# Patient Record
Sex: FEMALE | Race: BLACK OR AFRICAN AMERICAN | NOT HISPANIC OR LATINO | URBAN - METROPOLITAN AREA
[De-identification: names, ages, dates, MRNs, and addresses within clinical notes are randomized per-mention and may not be internally consistent; named-entity substitution may affect disease eponyms.]

---

## 2018-04-18 ENCOUNTER — INPATIENT (INPATIENT)
Facility: HOSPITAL | Age: 68
LOS: 4 days | Discharge: HOME | End: 2018-04-23
Attending: INTERNAL MEDICINE | Admitting: INTERNAL MEDICINE
Payer: MEDICARE

## 2018-04-18 VITALS — WEIGHT: 199.96 LBS | HEIGHT: 64 IN

## 2018-04-18 DIAGNOSIS — R07.9 CHEST PAIN, UNSPECIFIED: ICD-10-CM

## 2018-04-18 DIAGNOSIS — G89.29 OTHER CHRONIC PAIN: ICD-10-CM

## 2018-04-18 DIAGNOSIS — E87.5 HYPERKALEMIA: ICD-10-CM

## 2018-04-18 DIAGNOSIS — M54.9 DORSALGIA, UNSPECIFIED: ICD-10-CM

## 2018-04-18 DIAGNOSIS — Z86.12 PERSONAL HISTORY OF POLIOMYELITIS: ICD-10-CM

## 2018-04-18 DIAGNOSIS — I45.81 LONG QT SYNDROME: ICD-10-CM

## 2018-04-18 DIAGNOSIS — I25.110 ATHEROSCLEROTIC HEART DISEASE OF NATIVE CORONARY ARTERY WITH UNSTABLE ANGINA PECTORIS: ICD-10-CM

## 2018-04-18 DIAGNOSIS — E66.9 OBESITY, UNSPECIFIED: ICD-10-CM

## 2018-04-18 DIAGNOSIS — R94.31 ABNORMAL ELECTROCARDIOGRAM [ECG] [EKG]: ICD-10-CM

## 2018-04-18 DIAGNOSIS — E11.9 TYPE 2 DIABETES MELLITUS WITHOUT COMPLICATIONS: ICD-10-CM

## 2018-04-18 DIAGNOSIS — I44.1 ATRIOVENTRICULAR BLOCK, SECOND DEGREE: ICD-10-CM

## 2018-04-18 DIAGNOSIS — M51.36 OTHER INTERVERTEBRAL DISC DEGENERATION, LUMBAR REGION: ICD-10-CM

## 2018-04-18 DIAGNOSIS — I10 ESSENTIAL (PRIMARY) HYPERTENSION: ICD-10-CM

## 2018-04-18 DIAGNOSIS — I44.7 LEFT BUNDLE-BRANCH BLOCK, UNSPECIFIED: ICD-10-CM

## 2018-04-18 DIAGNOSIS — Z88.0 ALLERGY STATUS TO PENICILLIN: ICD-10-CM

## 2018-04-18 DIAGNOSIS — M50.30 OTHER CERVICAL DISC DEGENERATION, UNSPECIFIED CERVICAL REGION: ICD-10-CM

## 2018-04-18 DIAGNOSIS — D64.9 ANEMIA, UNSPECIFIED: ICD-10-CM

## 2018-04-18 DIAGNOSIS — M51.34 OTHER INTERVERTEBRAL DISC DEGENERATION, THORACIC REGION: ICD-10-CM

## 2018-04-18 DIAGNOSIS — M51.35 OTHER INTERVERTEBRAL DISC DEGENERATION, THORACOLUMBAR REGION: ICD-10-CM

## 2018-04-18 LAB
ALBUMIN SERPL ELPH-MCNC: 3.4 G/DL — SIGNIFICANT CHANGE UP (ref 3.3–5)
ALP SERPL-CCNC: 81 U/L — SIGNIFICANT CHANGE UP (ref 40–120)
ALT FLD-CCNC: 14 U/L — SIGNIFICANT CHANGE UP (ref 12–78)
ANION GAP SERPL CALC-SCNC: 8 MMOL/L — SIGNIFICANT CHANGE UP (ref 5–17)
AST SERPL-CCNC: 12 U/L — LOW (ref 15–37)
BASOPHILS # BLD AUTO: 0.04 K/UL — SIGNIFICANT CHANGE UP (ref 0–0.2)
BASOPHILS NFR BLD AUTO: 0.5 % — SIGNIFICANT CHANGE UP (ref 0–2)
BILIRUB SERPL-MCNC: 0.2 MG/DL — SIGNIFICANT CHANGE UP (ref 0.2–1.2)
BUN SERPL-MCNC: 29 MG/DL — HIGH (ref 7–23)
CALCIUM SERPL-MCNC: 9.3 MG/DL — SIGNIFICANT CHANGE UP (ref 8.5–10.1)
CHLORIDE SERPL-SCNC: 112 MMOL/L — HIGH (ref 96–108)
CK SERPL-CCNC: 109 U/L — SIGNIFICANT CHANGE UP (ref 26–192)
CO2 SERPL-SCNC: 23 MMOL/L — SIGNIFICANT CHANGE UP (ref 22–31)
CREAT SERPL-MCNC: 1.02 MG/DL — SIGNIFICANT CHANGE UP (ref 0.5–1.3)
D DIMER BLD IA.RAPID-MCNC: 525 NG/ML DDU — HIGH
EOSINOPHIL # BLD AUTO: 0.21 K/UL — SIGNIFICANT CHANGE UP (ref 0–0.5)
EOSINOPHIL NFR BLD AUTO: 2.4 % — SIGNIFICANT CHANGE UP (ref 0–6)
GLUCOSE SERPL-MCNC: 88 MG/DL — SIGNIFICANT CHANGE UP (ref 70–99)
HCT VFR BLD CALC: 34.9 % — SIGNIFICANT CHANGE UP (ref 34.5–45)
HGB BLD-MCNC: 11.4 G/DL — LOW (ref 11.5–15.5)
IMM GRANULOCYTES NFR BLD AUTO: 0.2 % — SIGNIFICANT CHANGE UP (ref 0–1.5)
LYMPHOCYTES # BLD AUTO: 2.69 K/UL — SIGNIFICANT CHANGE UP (ref 1–3.3)
LYMPHOCYTES # BLD AUTO: 30.7 % — SIGNIFICANT CHANGE UP (ref 13–44)
MCHC RBC-ENTMCNC: 30.2 PG — SIGNIFICANT CHANGE UP (ref 27–34)
MCHC RBC-ENTMCNC: 32.7 GM/DL — SIGNIFICANT CHANGE UP (ref 32–36)
MCV RBC AUTO: 92.3 FL — SIGNIFICANT CHANGE UP (ref 80–100)
MONOCYTES # BLD AUTO: 0.51 K/UL — SIGNIFICANT CHANGE UP (ref 0–0.9)
MONOCYTES NFR BLD AUTO: 5.8 % — SIGNIFICANT CHANGE UP (ref 2–14)
NEUTROPHILS # BLD AUTO: 5.3 K/UL — SIGNIFICANT CHANGE UP (ref 1.8–7.4)
NEUTROPHILS NFR BLD AUTO: 60.4 % — SIGNIFICANT CHANGE UP (ref 43–77)
NRBC # BLD: 0 /100 WBCS — SIGNIFICANT CHANGE UP (ref 0–0)
PLATELET # BLD AUTO: 395 K/UL — SIGNIFICANT CHANGE UP (ref 150–400)
POTASSIUM SERPL-MCNC: 4.5 MMOL/L — SIGNIFICANT CHANGE UP (ref 3.5–5.3)
POTASSIUM SERPL-SCNC: 4.5 MMOL/L — SIGNIFICANT CHANGE UP (ref 3.5–5.3)
PROT SERPL-MCNC: 8.2 GM/DL — SIGNIFICANT CHANGE UP (ref 6–8.3)
RBC # BLD: 3.78 M/UL — LOW (ref 3.8–5.2)
RBC # FLD: 15.3 % — HIGH (ref 10.3–14.5)
SODIUM SERPL-SCNC: 143 MMOL/L — SIGNIFICANT CHANGE UP (ref 135–145)
TROPONIN I SERPL-MCNC: <0.015 NG/ML — SIGNIFICANT CHANGE UP (ref 0.01–0.04)
WBC # BLD: 8.77 K/UL — SIGNIFICANT CHANGE UP (ref 3.8–10.5)
WBC # FLD AUTO: 8.77 K/UL — SIGNIFICANT CHANGE UP (ref 3.8–10.5)

## 2018-04-18 PROCEDURE — 71045 X-RAY EXAM CHEST 1 VIEW: CPT | Mod: 26

## 2018-04-18 PROCEDURE — 93010 ELECTROCARDIOGRAM REPORT: CPT

## 2018-04-18 PROCEDURE — 71275 CT ANGIOGRAPHY CHEST: CPT | Mod: 26

## 2018-04-18 RX ORDER — KETOROLAC TROMETHAMINE 30 MG/ML
30 SYRINGE (ML) INJECTION ONCE
Qty: 0 | Refills: 0 | Status: DISCONTINUED | OUTPATIENT
Start: 2018-04-18 | End: 2018-04-18

## 2018-04-18 RX ORDER — ASPIRIN/CALCIUM CARB/MAGNESIUM 324 MG
325 TABLET ORAL ONCE
Qty: 0 | Refills: 0 | Status: COMPLETED | OUTPATIENT
Start: 2018-04-18 | End: 2018-04-18

## 2018-04-18 RX ORDER — OXYCODONE AND ACETAMINOPHEN 5; 325 MG/1; MG/1
1 TABLET ORAL ONCE
Qty: 0 | Refills: 0 | Status: DISCONTINUED | OUTPATIENT
Start: 2018-04-18 | End: 2018-04-18

## 2018-04-18 RX ORDER — SODIUM CHLORIDE 9 MG/ML
3 INJECTION INTRAMUSCULAR; INTRAVENOUS; SUBCUTANEOUS ONCE
Qty: 0 | Refills: 0 | Status: COMPLETED | OUTPATIENT
Start: 2018-04-18 | End: 2018-04-18

## 2018-04-18 RX ORDER — KETOROLAC TROMETHAMINE 30 MG/ML
60 SYRINGE (ML) INJECTION ONCE
Qty: 0 | Refills: 0 | Status: DISCONTINUED | OUTPATIENT
Start: 2018-04-18 | End: 2018-04-18

## 2018-04-18 RX ADMIN — Medication 30 MILLIGRAM(S): at 22:05

## 2018-04-18 RX ADMIN — Medication 325 MILLIGRAM(S): at 22:47

## 2018-04-18 RX ADMIN — SODIUM CHLORIDE 3 MILLILITER(S): 9 INJECTION INTRAMUSCULAR; INTRAVENOUS; SUBCUTANEOUS at 21:57

## 2018-04-18 RX ADMIN — OXYCODONE AND ACETAMINOPHEN 1 TABLET(S): 5; 325 TABLET ORAL at 22:48

## 2018-04-18 NOTE — ED ADULT NURSE NOTE - HARM RISK FACTORS
Visit Information Date & Time Provider Department Dept. Phone Encounter #  
 2/24/2017  8:20 AM Gentry Nuñez MD [address] [facility] [phone] Follow-up Instructions Return in about 3 months (around 5/24/2017). Upcoming Health Maintenance Date Due Pneumococcal 19-64 Medium Risk (1 of 1 - PPSV23) 1/15/1993 DTaP/Tdap/Td series (1 - Tdap) 1/15/1995 PAP AKA CERVICAL CYTOLOGY 1/15/1995 INFLUENZA AGE 9 TO ADULT 8/1/2016 Allergies as of 2/24/2017  Review Complete On: 2/24/2017 By: Gentry Nuñez MD  
  
 Severity Noted Reaction Type Reactions Bactrim [Sulfamethoprim Ds]    Unknown (comments) Cefuroxime    Unknown (comments) Codeine    Unknown (comments) Compazine [Prochlorperazine Edisylate]    Unknown (comments) Glycopyrrolate    Unknown (comments) Keflex [Cephalexin]    Unknown (comments) Levaquin [Levofloxacin]    Unknown (comments) Morphine    Unknown (comments) Phenergan [Promethazine]    Unknown (comments) Reglan [Metoclopramide]    Unknown (comments) Vicodin [Hydrocodone-acetaminophen]    Unknown (comments) Current Immunizations  Never Reviewed No immunizations on file. Not reviewed this visit You Were Diagnosed With   
  
 Codes Comments Chronic pain of left ankle    -  Primary ICD-10-CM: M25.572, F31.55 ICD-9-CM: 719.47, 338.29 Intractable migraine with aura without status migrainosus     ICD-10-CM: G43.119 ICD-9-CM: 346.01 Brachial neuritis or radiculitis     ICD-10-CM: M54.12 
ICD-9-CM: 723.4 Neuropathy     ICD-10-CM: G62.9 ICD-9-CM: 355.9 Lumbosacral radiculopathy     ICD-10-CM: M54.17 ICD-9-CM: 724.4 Fibromyalgia     ICD-10-CM: M79.7 ICD-9-CM: 729.1 Musculoskeletal pain     ICD-10-CM: M79.1 ICD-9-CM: 729.1 Restless legs syndrome (RLS)     ICD-10-CM: G25.81 ICD-9-CM: 333.94 Adhesion of abdominal wall     ICD-10-CM: K66.0 ICD-9-CM: 568.0   
 DDD (degenerative disc disease), cervical     ICD-10-CM: M50.30 ICD-9-CM: 722.4 Facet arthropathy, cervical ([address]     ICD-10-CM: M12.88 ICD-9-CM: 721.0 Chronic pain syndrome     ICD-10-CM: G89.4 ICD-9-CM: 338.4 Other syndromes affecting cervical region     ICD-10-CM: M53.1 ICD-9-CM: 723.8 Chronic bilateral low back pain with bilateral sciatica     ICD-10-CM: M54.42, M54.41, G89.29 ICD-9-CM: 724.2, 724.3, 338.29 Encounter for long-term (current) use of high-risk medication     ICD-10-CM: Z79.899 ICD-9-CM: V58.69 Vitals BP  
  
  
  
  
  
 (!) 160/99 Vitals History BMI and BSA Data Body Mass Index Body Surface Area  
 27.41 kg/m 2 1.94 m 2 Preferred Pharmacy Pharmacy Name Phone [address] [place] [address], [address] [place] [address] [phone] Your Updated Medication List  
  
   
This list is accurate as of: 2/24/17  8:48 AM.  Always use your most recent med list.  
  
  
  
  
 ATENOLOL PO Take  by mouth. frovatriptan 2.5 mg tablet Commonly known as:  Rylee Cornejo Take 1 Tab by mouth once as needed for Migraine for up to 1 dose. JANUVIA 100 mg tablet Generic drug:  SITagliptin Take 50 mg by mouth daily. METFORMIN PO Take  by mouth.  
  
 methylPREDNISolone 4 mg tablet Commonly known as:  [place] Per dose pack instructions MOTRIN 800 mg tablet Generic drug:  ibuprofen Take  by mouth.  
  
 naratriptan 2.5 mg Tab Commonly known as:  Rowan Ranjeet Take 1 Tab by mouth once as needed for Migraine for up to 1 dose. 2.5 mg at onset of headache, may repeat in 4 hours if needed NEURONTIN 300 mg capsule Generic drug:  gabapentin Take 300 mg by mouth three (3) times daily. * OTHER  
T-L-S-O  
  
 * OTHER  
DDS Lumbar Traction Belt * PERCOCET  mg per tablet Generic drug:  oxyCODONE-acetaminophen Take 1 Tab by mouth. * oxyCODONE-acetaminophen  mg per tablet Commonly known as:  PERCOCET 10 Take 1 Tab by mouth every four (4) hours as needed for Pain (chronic) for up to 30 days. Max Daily Amount: 6 Tabs. Indications: Pain * oxyCODONE-acetaminophen  mg per tablet Commonly known as:  PERCOCET 10 Take 1 Tab by mouth every four (4) hours as needed for Pain for up to 30 days. Max Daily Amount: 6 Tabs. Indications: Pain Start taking on:  3/25/2017 * oxyCODONE-acetaminophen  mg per tablet Commonly known as:  PERCOCET 10 Take 1 Tab by mouth every four (4) hours as needed for Pain (chronic) for up to 30 days. Max Daily Amount: 6 Tabs. Indications: Pain Start taking on:  4/23/2017  
  
 propranolol LA 60 mg SR capsule Commonly known as:  INDERAL LA Take  by mouth daily. PROTONIX 40 mg tablet Generic drug:  pantoprazole Take 40 mg by mouth daily. REQUIP PO Take  by mouth. TENS Units Toy Leriche Commonly known as:  TENS 502  
1 Units by Does Not Apply route as directed. topiramate 25 mg tablet Commonly known as:  TOPAMAX This is a titration schedule. Start with 1 po qhs x 7 days. Then 1 po bid x 7 days. Then  1 po qam and 2 po qhs. Then  2 po bid. traZODone 150 mg tablet Commonly known as:  Yenny Cindy Take 0.5-1 Tabs by mouth nightly for 30 days. ZOFRAN (AS HYDROCHLORIDE) 8 mg tablet Generic drug:  ondansetron hcl Take 8 mg by mouth every eight (8) hours as needed for Nausea. ZyrTEC 10 mg tablet Generic drug:  cetirizine Take  by mouth daily. * Notice: This list has 6 medication(s) that are the same as other medications prescribed for you. Read the directions carefully, and ask your doctor or other care provider to review them with you. Prescriptions Printed Refills  
 oxyCODONE-acetaminophen (PERCOCET 10)  mg per tablet 0 Starting on: 4/23/2017 Sig: Take 1 Tab by mouth every four (4) hours as needed for Pain (chronic) for up to 30 days. Max Daily Amount: 6 Tabs. Indications: Pain Class: Print Route: Oral  
 oxyCODONE-acetaminophen (PERCOCET 10)  mg per tablet 0 Starting on: 3/25/2017 Sig: Take 1 Tab by mouth every four (4) hours as needed for Pain for up to 30 days. Max Daily Amount: 6 Tabs. Indications: Pain Class: Print Route: Oral  
 oxyCODONE-acetaminophen (PERCOCET 10)  mg per tablet 0 Sig: Take 1 Tab by mouth every four (4) hours as needed for Pain (chronic) for up to 30 days. Max Daily Amount: 6 Tabs. Indications: Pain Class: Print Route: Oral  
 OTHER prn Sig: DDS Lumbar Traction Belt Class: Print Prescriptions Sent to Pharmacy Refills  
 traZODone (DESYREL) 150 mg tablet 11 Sig: Take 0.5-1 Tabs by mouth nightly for 30 days. Class: Normal  
 Pharmacy: [address] [place] [address], [address] [place] [address] #: [phone] Route: Oral  
  
We Performed the Following REFERRAL TO ORTHOPEDICS [KUK294 Custom] Comments:  
 Please evaluate patient for recurrent left ankle sprain Follow-up Instructions Return in about 3 months (around 5/24/2017). Referral Information Referral ID Referred By Referred To  
  
 [id number] [facility] [address] MD   
   [address], [address] Phone: [phone] Fax: [phone] Visits Status Start Date End Date 1 New Request 2/24/17 2/24/18 If your referral has a status of pending review or denied, additional information will be sent to support the outcome of this decision. Patient Instructions Current health maintenance issues were reviewed and the patient was advised to followup with his/her PCP for completion of these items. Introducing [facility]!    
 Logan Rai introduces [employer] patient portal. Now you can access parts of your medical record, email your doctor's office, and request medication refills online. 1. In your internet browser, go to [email or web address] 2. Click on the First Time User? Click Here link in the Sign In box. You will see the New Member Sign Up page. 3. Enter your [employer] Access Code exactly as it appears below. You will not need to use this code after youve completed the sign-up process. If you do not sign up before the expiration date, you must request a new code. · [employer] Access Code: [email or web address] Expires: 3/8/2017 10:00 AM 
 
4. Enter the last four digits of your Social Security Number (xxxx) and Date of Birth (mm/dd/yyyy) as indicated and click Submit. You will be taken to the next sign-up page. 5. Create a [employer] ID. This will be your [employer] login ID and cannot be changed, so think of one that is secure and easy to remember. 6. Create a [employer] password. You can change your password at any time. 7. Enter your Password Reset Question and Answer. This can be used at a later time if you forget your password. 8. Enter your e-mail address. You will receive e-mail notification when new information is available in [address] 9. Click Sign Up. You can now view and download portions of your medical record. 10. Click the Download Summary menu link to download a portable copy of your medical information. If you have questions, please visit the Frequently Asked Questions section of the [employer] website. Remember, [employer] is NOT to be used for urgent needs. For medical emergencies, dial 911. Now available from your iPhone and Android! Please provide this summary of care documentation to your next provider. Your primary care clinician is listed as Amira Samuels. If you have any questions after today's visit, please call [phone]. no

## 2018-04-18 NOTE — ED STATDOCS - MUSCULOSKELETAL, MLM
range of motion is not limited. +b/l LE atrophy from polio. +TTP of right sub scapular area, no midline CTLS TTP.

## 2018-04-18 NOTE — ED STATDOCS - PROGRESS NOTE DETAILS
MAGDA Coy:  Pt seen and examined by intake provider, she presented with right sided upper back pain x 2 wks. I will f/u with labs and reassess the pt. if elevated DDimer will proceed with CTA to r/o PE. MAGDA Morilloit:  Pt with elevated DDimer will check CTA to r/o PE. Pt also f/w new LBBB, will check trop. d/w attending.

## 2018-04-18 NOTE — ED STATDOCS - OBJECTIVE STATEMENT
66 y/o female with a PMHx of type 2 DM, HTN, polio with b/l LE atrophy presents to the ED c/o right sided upper back pain x2weeks. For 2 weeks, pt has had right sided upper back, sub scapular pain. Pt states the pain is worsened and becomes sharp with deep breaths. Pt is not sure how the pain started. Pt tried Bengay, Advil PM without relief. PMD: Dr. Jaylene Boss.

## 2018-04-19 DIAGNOSIS — A80.9 ACUTE POLIOMYELITIS, UNSPECIFIED: ICD-10-CM

## 2018-04-19 DIAGNOSIS — R94.31 ABNORMAL ELECTROCARDIOGRAM [ECG] [EKG]: ICD-10-CM

## 2018-04-19 DIAGNOSIS — E11.9 TYPE 2 DIABETES MELLITUS WITHOUT COMPLICATIONS: ICD-10-CM

## 2018-04-19 DIAGNOSIS — M54.9 DORSALGIA, UNSPECIFIED: ICD-10-CM

## 2018-04-19 DIAGNOSIS — I10 ESSENTIAL (PRIMARY) HYPERTENSION: ICD-10-CM

## 2018-04-19 LAB
ALBUMIN SERPL ELPH-MCNC: 3.3 G/DL — SIGNIFICANT CHANGE UP (ref 3.3–5)
ALP SERPL-CCNC: 89 U/L — SIGNIFICANT CHANGE UP (ref 40–120)
ALT FLD-CCNC: 12 U/L — SIGNIFICANT CHANGE UP (ref 12–78)
ANION GAP SERPL CALC-SCNC: 8 MMOL/L — SIGNIFICANT CHANGE UP (ref 5–17)
AST SERPL-CCNC: 10 U/L — LOW (ref 15–37)
BILIRUB SERPL-MCNC: 0.2 MG/DL — SIGNIFICANT CHANGE UP (ref 0.2–1.2)
BUN SERPL-MCNC: 28 MG/DL — HIGH (ref 7–23)
CALCIUM SERPL-MCNC: 9.2 MG/DL — SIGNIFICANT CHANGE UP (ref 8.5–10.1)
CHLORIDE SERPL-SCNC: 109 MMOL/L — HIGH (ref 96–108)
CHOLEST SERPL-MCNC: 145 MG/DL — SIGNIFICANT CHANGE UP (ref 10–199)
CO2 SERPL-SCNC: 27 MMOL/L — SIGNIFICANT CHANGE UP (ref 22–31)
CREAT SERPL-MCNC: 1.06 MG/DL — SIGNIFICANT CHANGE UP (ref 0.5–1.3)
GLUCOSE BLDC GLUCOMTR-MCNC: 137 MG/DL — HIGH (ref 70–99)
GLUCOSE BLDC GLUCOMTR-MCNC: 87 MG/DL — SIGNIFICANT CHANGE UP (ref 70–99)
GLUCOSE SERPL-MCNC: 80 MG/DL — SIGNIFICANT CHANGE UP (ref 70–99)
HDLC SERPL-MCNC: 60 MG/DL — SIGNIFICANT CHANGE UP (ref 40–125)
LIPID PNL WITH DIRECT LDL SERPL: 73 MG/DL — SIGNIFICANT CHANGE UP
POTASSIUM SERPL-MCNC: 4.7 MMOL/L — SIGNIFICANT CHANGE UP (ref 3.5–5.3)
POTASSIUM SERPL-SCNC: 4.7 MMOL/L — SIGNIFICANT CHANGE UP (ref 3.5–5.3)
PROT SERPL-MCNC: 8.1 GM/DL — SIGNIFICANT CHANGE UP (ref 6–8.3)
SODIUM SERPL-SCNC: 144 MMOL/L — SIGNIFICANT CHANGE UP (ref 135–145)
T3 SERPL-MCNC: 93 NG/DL — SIGNIFICANT CHANGE UP (ref 80–200)
T4 AB SER-ACNC: 6.9 UG/DL — SIGNIFICANT CHANGE UP (ref 4.6–12)
TOTAL CHOLESTEROL/HDL RATIO MEASUREMENT: 2.4 RATIO — LOW (ref 3.3–7.1)
TRIGL SERPL-MCNC: 61 MG/DL — SIGNIFICANT CHANGE UP (ref 10–149)
TROPONIN I SERPL-MCNC: <0.015 NG/ML — SIGNIFICANT CHANGE UP (ref 0.01–0.04)
TROPONIN I SERPL-MCNC: <0.015 NG/ML — SIGNIFICANT CHANGE UP (ref 0.01–0.04)
TSH SERPL-MCNC: 2.28 UU/ML — SIGNIFICANT CHANGE UP (ref 0.36–3.74)

## 2018-04-19 PROCEDURE — 93306 TTE W/DOPPLER COMPLETE: CPT | Mod: 26

## 2018-04-19 PROCEDURE — 99285 EMERGENCY DEPT VISIT HI MDM: CPT

## 2018-04-19 RX ORDER — SERTRALINE 25 MG/1
0 TABLET, FILM COATED ORAL
Qty: 0 | Refills: 0 | COMMUNITY

## 2018-04-19 RX ORDER — MONTELUKAST 4 MG/1
10 TABLET, CHEWABLE ORAL DAILY
Qty: 0 | Refills: 0 | Status: DISCONTINUED | OUTPATIENT
Start: 2018-04-19 | End: 2018-04-23

## 2018-04-19 RX ORDER — SIMVASTATIN 20 MG/1
20 TABLET, FILM COATED ORAL AT BEDTIME
Qty: 0 | Refills: 0 | Status: DISCONTINUED | OUTPATIENT
Start: 2018-04-19 | End: 2018-04-23

## 2018-04-19 RX ORDER — LIDOCAINE 4 G/100G
1 CREAM TOPICAL EVERY 24 HOURS
Qty: 0 | Refills: 0 | Status: DISCONTINUED | OUTPATIENT
Start: 2018-04-19 | End: 2018-04-23

## 2018-04-19 RX ORDER — OXYCODONE HYDROCHLORIDE 5 MG/1
5 TABLET ORAL ONCE
Qty: 0 | Refills: 0 | Status: DISCONTINUED | OUTPATIENT
Start: 2018-04-19 | End: 2018-04-19

## 2018-04-19 RX ORDER — OXYCODONE AND ACETAMINOPHEN 5; 325 MG/1; MG/1
1 TABLET ORAL ONCE
Qty: 0 | Refills: 0 | Status: DISCONTINUED | OUTPATIENT
Start: 2018-04-19 | End: 2018-04-19

## 2018-04-19 RX ORDER — ASPIRIN/CALCIUM CARB/MAGNESIUM 324 MG
81 TABLET ORAL DAILY
Qty: 0 | Refills: 0 | Status: DISCONTINUED | OUTPATIENT
Start: 2018-04-19 | End: 2018-04-23

## 2018-04-19 RX ORDER — SERTRALINE 25 MG/1
100 TABLET, FILM COATED ORAL DAILY
Qty: 0 | Refills: 0 | Status: DISCONTINUED | OUTPATIENT
Start: 2018-04-19 | End: 2018-04-23

## 2018-04-19 RX ORDER — OXYCODONE AND ACETAMINOPHEN 5; 325 MG/1; MG/1
1 TABLET ORAL EVERY 6 HOURS
Qty: 0 | Refills: 0 | Status: DISCONTINUED | OUTPATIENT
Start: 2018-04-19 | End: 2018-04-22

## 2018-04-19 RX ORDER — METFORMIN HYDROCHLORIDE 850 MG/1
0 TABLET ORAL
Qty: 0 | Refills: 0 | COMMUNITY

## 2018-04-19 RX ORDER — LOSARTAN POTASSIUM 100 MG/1
50 TABLET, FILM COATED ORAL DAILY
Qty: 0 | Refills: 0 | Status: DISCONTINUED | OUTPATIENT
Start: 2018-04-19 | End: 2018-04-23

## 2018-04-19 RX ORDER — INSULIN LISPRO 100/ML
VIAL (ML) SUBCUTANEOUS
Qty: 0 | Refills: 0 | Status: DISCONTINUED | OUTPATIENT
Start: 2018-04-19 | End: 2018-04-23

## 2018-04-19 RX ADMIN — MONTELUKAST 10 MILLIGRAM(S): 4 TABLET, CHEWABLE ORAL at 14:30

## 2018-04-19 RX ADMIN — LIDOCAINE 1 PATCH: 4 CREAM TOPICAL at 14:29

## 2018-04-19 RX ADMIN — SERTRALINE 100 MILLIGRAM(S): 25 TABLET, FILM COATED ORAL at 14:30

## 2018-04-19 RX ADMIN — OXYCODONE AND ACETAMINOPHEN 1 TABLET(S): 5; 325 TABLET ORAL at 20:45

## 2018-04-19 RX ADMIN — SIMVASTATIN 20 MILLIGRAM(S): 20 TABLET, FILM COATED ORAL at 21:46

## 2018-04-19 RX ADMIN — OXYCODONE AND ACETAMINOPHEN 1 TABLET(S): 5; 325 TABLET ORAL at 02:17

## 2018-04-19 RX ADMIN — LOSARTAN POTASSIUM 50 MILLIGRAM(S): 100 TABLET, FILM COATED ORAL at 14:30

## 2018-04-19 RX ADMIN — OXYCODONE AND ACETAMINOPHEN 1 TABLET(S): 5; 325 TABLET ORAL at 19:51

## 2018-04-19 RX ADMIN — OXYCODONE AND ACETAMINOPHEN 1 TABLET(S): 5; 325 TABLET ORAL at 01:09

## 2018-04-19 RX ADMIN — OXYCODONE HYDROCHLORIDE 5 MILLIGRAM(S): 5 TABLET ORAL at 14:34

## 2018-04-19 RX ADMIN — Medication 81 MILLIGRAM(S): at 14:29

## 2018-04-19 NOTE — H&P ADULT - NSHPREVIEWOFSYSTEMS_GEN_ALL_CORE
REVIEW OF SYSTEMS:  General: NAD, hemodynamically stable   HEENT:  Eyes:  No visual loss, blurred vision, double vision or yellow sclerae. Ears, Nose, Throat:  No hearing loss, sneezing, congestion, runny nose or sore throat.  SKIN:  No rash or itching.  CARDIOVASCULAR:  No chest pain, chest pressure or chest discomfort. No palpitations or edema.  RESPIRATORY:  No shortness of breath, cough or sputum.  GASTROINTESTINAL:  No anorexia, nausea, vomiting or diarrhea. No abdominal pain or blood.  NEUROLOGICAL:  No headache, dizziness, syncope, paralysis, ataxia, numbness or tingling in the extremities. No change in bowel or bladder control.  MUSCULOSKELETAL:  No muscle, back pain, joint pain or stiffness.  HEMATOLOGIC:  No anemia, bleeding or bruising.  LYMPHATICS:  No enlarged nodes. No history of splenectomy.  ENDOCRINOLOGIC:  No reports of sweating, cold or heat intolerance. No polyuria or polydipsia.  ALLERGIES:  No history of asthma, hives, eczema or rhinitis. REVIEW OF SYSTEMS:  General: NAD, hemodynamically stable   HEENT:  Eyes:  No visual loss, blurred vision, double vision or yellow sclerae. Ears, Nose, Throat:  No hearing loss, sneezing, congestion, runny nose or sore throat.  SKIN:  No rash or itching.  CARDIOVASCULAR:  No chest pain, chest pressure or chest discomfort. No palpitations or edema.  RESPIRATORY:  No shortness of breath, cough or sputum.  GASTROINTESTINAL:  No anorexia, nausea, vomiting or diarrhea. No abdominal pain or blood.  NEUROLOGICAL:  No headache, dizziness, syncope, paralysis, ataxia, numbness or tingling in the extremities. No change in bowel or bladder control.  MUSCULOSKELETAL:  back pain /  worse with motion /  sharp / radiating towards (R) arm  / numbness  ENDOCRINOLOGIC:  hx of DM

## 2018-04-19 NOTE — H&P ADULT - PROBLEM SELECTOR PLAN 1
- Tele -  dropped QRS complexes episodes without OK prlongation  - EKG - QRS prolongation with LBBB  - ASA/ STATIN  - trops x 1 (-)  - Dr. Paige consulted - Tele -  dropped QRS complexes episodes without LA prlongation  - EKG - QRS prolongation with LBBB, unknown baseline  - ASA/ STATIN  - trops x 1 (-)  - Dr. Paige consulted - Tele -  dropped QRS complexes episodes without MA prlongation  - EKG - QRS prolongation with LBBB, unknown baseline  - ASA/ STATIN  - trops x 1 (-)  - Dr. Paige consulted / EP eval - Tele -  dropped QRS complexes episodes without LA prolongation - to me tele tracing appears to be second degree mobitz type II heartblock  - EKG - QRS prolongation with LBBB, unknown baseline  - ASA/ STATIN  - trops x 1 (-)  - Dr. Paige consulted / EP eval

## 2018-04-19 NOTE — H&P ADULT - NSHPPHYSICALEXAM_GEN_ALL_CORE
Physical Exam:   GENERAL APPEARANCE:  NAD, hemodynamically stable  T(C): 36.2 (04-19-18 @ 09:50), Max: 36.5 (04-18-18 @ 21:06)  HR: 72 (04-19-18 @ 09:50) (71 - 77)  BP: 150/75 (04-19-18 @ 09:50) (129/71 - 150/75)  RR: 18 (04-19-18 @ 09:50) (18 - 19)  SpO2: 100% (04-19-18 @ 09:50) (100% - 100%)  Wt(kg): --  HEENT:  Head is normocephalic    Skin:  Warm and dry without any rash   NECK:  Supple without lymphadenopathy.   HEART:  Regular rate and rhythm. normal S1 and S2, No M/R/G  LUNGS:  Good ins/exp effort, no W/R/R/C  ABDOMEN:  Soft, nontender, nondistended with good bowel sounds heard  EXTREMITIES:  Without cyanosis, clubbing or edema.   NEUROLOGICAL:  Gross nonfocal Physical Exam:   GENERAL APPEARANCE:  NAD, hemodynamically stable  T(C): 36.2 (04-19-18 @ 09:50), Max: 36.5 (04-18-18 @ 21:06)  HR: 72 (04-19-18 @ 09:50) (71 - 77)  BP: 150/75 (04-19-18 @ 09:50) (129/71 - 150/75)  RR: 18 (04-19-18 @ 09:50) (18 - 19)  SpO2: 100% (04-19-18 @ 09:50) (100% - 100%)  Wt(kg): --  HEENT:  Head is normocephalic    Skin:  Warm and dry without any rash   NECK:  Supple without lymphadenopathy.   HEART:  Regular rate and rhythm. normal S1 and S2, No M/R/G  LUNGS:  Good ins/exp effort, no W/R/R/C  ABDOMEN:  Soft, nontender, nondistended with good bowel sounds heard  EXTREMITIES:  lower extremity muscle atrophy secondary to polio (wheel-chair dependent) Physical Exam:   GENERAL APPEARANCE:  NAD, hemodynamically stable   T(C): 36.2 (04-19-18 @ 09:50), Max: 36.5 (04-18-18 @ 21:06)  HR: 72 (04-19-18 @ 09:50) (71 - 77)  BP: 150/75 (04-19-18 @ 09:50) (129/71 - 150/75)  RR: 18 (04-19-18 @ 09:50) (18 - 19)  SpO2: 100% (04-19-18 @ 09:50) (100% - 100%)  HEENT:  Head is normocephalic    Skin:  Warm and dry without any rash   NECK:  Supple without lymphadenopathy.   HEART: RRR, normal S1 S2  LUNGS:  Good ins/exp effort   ABDOMEN:  Soft, nontender, nondistended with good bowel sounds heard  EXTREMITIES:  lower extremity muscle atrophy secondary to polio (wheel-chair dependent). Pain reproducible with palpation around the thoracic spine

## 2018-04-19 NOTE — H&P ADULT - PROBLEM SELECTOR PLAN 2
- MRI of the cervical and thoracic spine  - Lidoderm patch - Lidoderm patch  - outpatient workup suggested

## 2018-04-19 NOTE — ED ADULT NURSE REASSESSMENT NOTE - NS ED NURSE REASSESS COMMENT FT1
Pt received in stretcher. A&Ox3. MAEx4. C/O severe pain to lower back. spastic as per pt. Valium given as ordered and tolerated well. All needs are met and safety maintained. Will continue to monitor.

## 2018-04-19 NOTE — CONSULT NOTE ADULT - SUBJECTIVE AND OBJECTIVE BOX
HPI:  Pt is a 68 y/o/f with extensive pmhx of type II DM, HTN, polio (b/l LE atrophy) admitted with cc of (R) sided back pain around the shoulder blade past 2 weeks. Tried to alleviate her back pain with NSAIDs - without any resolution of pain. Patient states that, her back pain worse with motion / radiating occasionally towards her (R) upper extremity and deep breaths. While in the ED - EKG demonstrated QRS prolongation with LBBB. In addition, tele demonstrating episodes of dropped QRS complexes without prolongation of NH interval. Patient chest pain free. Denies any jaw pain, shoulder pain, diaphoresis or shortness of breath. Patient's risk factors include - DM /  HTN.     4/19/18: EP asked to consult for bradycardia, LBBB  TELE: NSR, LBBB with episodes of bradycardia to 35-45 bpm and Mobitz type II HB  EKG: SR 78 bpm, LBBB      PAST MEDICAL & SURGICAL HISTORY:  Polio  HTN (hypertension)  DM (diabetes mellitus)  No significant past surgical history      MEDICATIONS  (STANDING):  aspirin  chewable 81 milliGRAM(s) Oral daily  insulin lispro (HumaLOG) corrective regimen sliding scale   SubCutaneous three times a day before meals  lidocaine   Patch 1 Patch Transdermal every 24 hours  losartan 50 milliGRAM(s) Oral daily  montelukast 10 milliGRAM(s) Oral daily  sertraline 100 milliGRAM(s) Oral daily  simvastatin 20 milliGRAM(s) Oral at bedtime    MEDICATIONS  (PRN):    Allergies    morphine (Unknown)  penicillin (Unknown)  sulfa drugs (Unknown)        SOCIAL HISTORY: Denies tobacco, etoh abuse or illicit drug use    FAMILY HISTORY:  Family history of early CAD (Child)      Vital Signs Last 24 Hrs  T(C): 36.2 (19 Apr 2018 09:50), Max: 36.5 (18 Apr 2018 21:06)  T(F): 97.1 (19 Apr 2018 09:50), Max: 97.7 (18 Apr 2018 21:06)  HR: 72 (19 Apr 2018 09:50) (71 - 77)  BP: 150/75 (19 Apr 2018 09:50) (129/71 - 150/75)  BP(mean): --  RR: 18 (19 Apr 2018 09:50) (18 - 19)  SpO2: 100% (19 Apr 2018 09:50) (100% - 100%)    REVIEW OF SYSTEMS:    CONSTITUTIONAL:  As per HPI.  HEENT:  Eyes:  No diplopia or blurred vision. ENT:  No earache, sore throat or runny nose.  CARDIOVASCULAR:  No pressure, squeezing, strangling, tightness, heaviness or aching about the chest, neck, axilla or epigastrium.  RESPIRATORY:  No cough, shortness of breath, PND or orthopnea.  GASTROINTESTINAL:  No nausea, vomiting or diarrhea.  GENITOURINARY:  No dysuria, frequency or urgency.  MUSCULOSKELETAL:  As per HPI.  SKIN:  No change in skin, hair or nails.  NEUROLOGIC:  No paresthesias, fasciculations, seizures or weakness.  PSYCHIATRIC:  No disorder of thought or mood.  ENDOCRINE:  No heat or cold intolerance, polyuria or polydipsia.  HEMATOLOGICAL:  No easy bruising or bleedings:  .     PHYSICAL EXAMINATION:    GENERAL APPEARANCE:  Pt. is not currently dyspneic, in no distress. Pt. is alert, oriented, and pleasant.  HEENT:  Pupils are normal and react normally. No icterus. Mucous membranes well colored.  NECK:  Supple. No lymphadenopathy. Jugular venous pressure not elevated. Carotids equal.   HEART:   The cardiac impulse has a normal quality. There are no murmurs, rubs or gallops noted  CHEST:  Chest is clear to auscultation. Normal respiratory effort.  ABDOMEN:  Soft and nontender.   EXTREMITIES:  There is no edema.   SKIN:  No rash or significant lesions are noted.      LABS:                        11.4   8.77  )-----------( 395      ( 18 Apr 2018 21:54 )             34.9     04-19    144  |  109<H>  |  28<H>  ----------------------------<  80  4.7   |  27  |  1.06    Ca    9.2      19 Apr 2018 12:21    TPro  8.1  /  Alb  3.3  /  TBili  0.2  /  DBili  x   /  AST  10<L>  /  ALT  12  /  AlkPhos  89  04-19    LIVER FUNCTIONS - ( 19 Apr 2018 12:21 )  Alb: 3.3 g/dL / Pro: 8.1 gm/dL / ALK PHOS: 89 U/L / ALT: 12 U/L / AST: 10 U/L / GGT: x             CARDIAC MARKERS ( 19 Apr 2018 12:21 )  <0.015 ng/mL / x     / x     / x     / x      CARDIAC MARKERS ( 18 Apr 2018 22:44 )  <0.015 ng/mL / x     / 109 U/L / x     / x

## 2018-04-19 NOTE — H&P ADULT - NSHPLABSRESULTS_GEN_ALL_CORE
CBC Full  -  ( 18 Apr 2018 21:54 )  WBC Count : 8.77 K/uL  Hemoglobin : 11.4 g/dL  Hematocrit : 34.9 %  Platelet Count - Automated : 395 K/uL      143  |  112<H>  |  29<H>  ----------------------------<  88  4.5   |  23  |  1.02    Ca    9.3      18 Apr 2018 22:44    TPro  8.2  /  Alb  3.4  /  TBili  0.2  /  DBili  x   /  AST  12<L>  /  ALT  14  /  AlkPhos  81  04-18

## 2018-04-19 NOTE — CONSULT NOTE ADULT - ATTENDING COMMENTS
This patient is a pleasant 67 yr old female w/ obesity, HTN, DM and polio who presented w/ back pain and was noted to have intermittent LBBB on telemetry as well as episodes of 2:1 AV block w/ narrow and wide complex conduction.  No symptoms.    Recommendation:    Check Echocardiogram  A dual Chamber PPM recommended (If LV EF is normal) as a result of her conduction system disease.  R/B alternatives explained and patient is aware and willing to proceed.

## 2018-04-20 LAB
GLUCOSE BLDC GLUCOMTR-MCNC: 314 MG/DL — HIGH (ref 70–99)
GLUCOSE BLDC GLUCOMTR-MCNC: 85 MG/DL — SIGNIFICANT CHANGE UP (ref 70–99)
GLUCOSE BLDC GLUCOMTR-MCNC: 96 MG/DL — SIGNIFICANT CHANGE UP (ref 70–99)
HBA1C BLD-MCNC: 5.4 % — SIGNIFICANT CHANGE UP (ref 4–5.6)

## 2018-04-20 PROCEDURE — 93010 ELECTROCARDIOGRAM REPORT: CPT

## 2018-04-20 PROCEDURE — 71045 X-RAY EXAM CHEST 1 VIEW: CPT | Mod: 26

## 2018-04-20 RX ORDER — ACETAMINOPHEN 500 MG
975 TABLET ORAL ONCE
Qty: 0 | Refills: 0 | Status: COMPLETED | OUTPATIENT
Start: 2018-04-20 | End: 2018-04-20

## 2018-04-20 RX ORDER — ACETAMINOPHEN 500 MG
650 TABLET ORAL EVERY 6 HOURS
Qty: 0 | Refills: 0 | Status: DISCONTINUED | OUTPATIENT
Start: 2018-04-20 | End: 2018-04-22

## 2018-04-20 RX ORDER — VANCOMYCIN HCL 1 G
1000 VIAL (EA) INTRAVENOUS ONCE
Qty: 0 | Refills: 0 | Status: COMPLETED | OUTPATIENT
Start: 2018-04-20 | End: 2018-04-20

## 2018-04-20 RX ADMIN — OXYCODONE AND ACETAMINOPHEN 1 TABLET(S): 5; 325 TABLET ORAL at 19:26

## 2018-04-20 RX ADMIN — MONTELUKAST 10 MILLIGRAM(S): 4 TABLET, CHEWABLE ORAL at 15:46

## 2018-04-20 RX ADMIN — Medication 250 MILLIGRAM(S): at 10:32

## 2018-04-20 RX ADMIN — LOSARTAN POTASSIUM 50 MILLIGRAM(S): 100 TABLET, FILM COATED ORAL at 05:16

## 2018-04-20 RX ADMIN — Medication 4: at 17:34

## 2018-04-20 RX ADMIN — LIDOCAINE 1 PATCH: 4 CREAM TOPICAL at 01:52

## 2018-04-20 RX ADMIN — OXYCODONE AND ACETAMINOPHEN 1 TABLET(S): 5; 325 TABLET ORAL at 02:30

## 2018-04-20 RX ADMIN — SIMVASTATIN 20 MILLIGRAM(S): 20 TABLET, FILM COATED ORAL at 21:23

## 2018-04-20 RX ADMIN — SERTRALINE 100 MILLIGRAM(S): 25 TABLET, FILM COATED ORAL at 15:46

## 2018-04-20 RX ADMIN — Medication 81 MILLIGRAM(S): at 15:46

## 2018-04-20 RX ADMIN — Medication 975 MILLIGRAM(S): at 15:53

## 2018-04-20 RX ADMIN — Medication 975 MILLIGRAM(S): at 14:03

## 2018-04-20 RX ADMIN — OXYCODONE AND ACETAMINOPHEN 1 TABLET(S): 5; 325 TABLET ORAL at 01:51

## 2018-04-20 RX ADMIN — LIDOCAINE 1 PATCH: 4 CREAM TOPICAL at 15:46

## 2018-04-20 NOTE — CONSULT NOTE ADULT - SUBJECTIVE AND OBJECTIVE BOX
Patient is a 67y old  Female who presents with a chief complaint of Back pain (19 Apr 2018 11:47)      HPI:  Pt is a 68 y/o/f with extensive pmhx of type II DM, HTN, polio (b/l LE atrophy) admitted with cc of (R) sided back pain around the shoulder blade past 2 weeks. Tried to alleviate her back pain with NSAIDs - without any resolution of pain. Patient states that, her back pain worse with motion / radiating occasionally towards her (R) upper extremity and deep breaths. While in the ED - EKG demonstrated QRS prolongation with LBBB. In addition, tele demonstrating episodes of dropped QRS complexes without prolongation of [place] interval. Patient chest pain free. Denies any jaw pain, shoulder pain, diaphoresis or shortness of breath. Patient's risk factors include - DM /  HTN.     care discussed with patient's cardiologist Dr. Paige. care also discussed with EP service. (19 Apr 2018 11:47)      PAST MEDICAL & SURGICAL HISTORY:  Polio  HTN (hypertension)  DM (diabetes mellitus)  No significant past surgical history      PREVIOUS CARDIAC WORKUP:      Echo:  Stress Test:  Cardiac Cath:    ALLERGIES:    morphine (Unknown)  penicillin (Unknown)  sulfa drugs (Unknown)       MEDICATIONS  (STANDING):  aspirin  chewable 81 milliGRAM(s) Oral daily  insulin lispro (HumaLOG) corrective regimen sliding scale   SubCutaneous three times a day before meals  lidocaine   Patch 1 Patch Transdermal every 24 hours  losartan 50 milliGRAM(s) Oral daily  montelukast 10 milliGRAM(s) Oral daily  sertraline 100 milliGRAM(s) Oral daily  simvastatin 20 milliGRAM(s) Oral at bedtime    MEDICATIONS  (PRN):  oxyCODONE    5 mG/acetaminophen 325 mG 1 Tablet(s) Oral every 6 hours PRN Severe Pain (7 - 10)      FAMILY HISTORY:  Family history of early CAD (Child)        SOCIAL HISTORY:  .scl     ROS:     .ros    Vital Signs Last 24 Hrs  T(C): 36.7 (20 Apr 2018 06:33), Max: 36.8 (19 Apr 2018 15:30)  T(F): 98.1 (20 Apr 2018 06:33), Max: 98.2 (19 Apr 2018 15:30)  HR: 72 (20 Apr 2018 06:33) (72 - 81)  BP: 135/59 (20 Apr 2018 06:33) (135/59 - 167/73)  BP(mean): --  RR: 16 (20 Apr 2018 06:33) (16 - 18)  SpO2: 100% (20 Apr 2018 04:48) (100% - 100%)    I&O's Summary      PHYSICAL EXAM:    General:                Comfortable, AAO X 3, in no distress.  HEENT:                  Atraumatic, PERRLA, EOMI, conjunctiva clear.   Neck:                     Supple, no adenopathy, no thyromegaly, no JVD, no bruit.  Back:                     Symmetric, non tender.  Chest:                    Clear, B/L symmetric air entry, no tachypnea  Heart:                     S1, S2 normal, no gallop, no murmur.  Abdomen:              Soft, non-tender, bowel sounds active. No palpable masses.  Extremities:           no cyanosis, no edema. Peripheral pulses normal.  Skin:                      Skin color, texture normal. No rashes.  Neurologic:            Grossly nonfocal.      TELEMETRY:    ECG:  NSR, LBBB    LABS:                        11.4   8.77  )-----------( 395      ( 18 Apr 2018 21:54 )             34.9     04-19    144  |  109<H>  |  28<H>  ----------------------------<  80  4.7   |  27  |  1.06    Ca    9.2      19 Apr 2018 12:21    TPro  8.1  /  Alb  3.3  /  TBili  0.2  /  DBili  x   /  AST  10<L>  /  ALT  12  /  AlkPhos  89  04-19    Lipid Panel  Chl 145  HDL 60  LDL 73  Trg 61      04-19 @ 16:23  Trop-I  <0.015    04-19 @ 12:21  Trop-I  <0.015    04-18 @ 22:44  Trop-I  <0.015  CK      109      D Dimer  525 04-18 @ 21:54    Thyroid Stimulating Hormone, Serum (04.19.18 @ 12:21)    Thyroid Stimulating Hormone, Serum: 2.280 uU/mL              RADIOLOGY & ADDITIONAL STUDIES: Chief complaint of Back pain (19 Apr 2018 11:47)      HPI:  Pt is a 68 y/o/f with extensive pmhx of type II DM, HTN, polio (b/l LE atrophy) admitted with cc of (R) sided back pain around the shoulder blade past 2 weeks. Tried to alleviate her back pain with NSAIDs - without any resolution of pain. Patient states that, her back pain worse with motion / radiating occasionally towards her (R) upper extremity. Also worse with deep breaths. While in the ED - EKG demonstrated QRS prolongation with LBBB. In addition, tele demonstrating episodes of dropped QRS complexes without prolongation of [place] interval. Patient chest pain free. Denies any jaw pain, shoulder pain, diaphoresis or shortness of breath. Patient's risk factors include - DM /  HTN. She has been fatigued. C/o generalized weakness. No syncope. Feels dizzy      PAST MEDICAL & SURGICAL HISTORY:  Polio  HTN (hypertension)  DM (diabetes mellitus)  No significant past surgical history      PREVIOUS CARDIAC WORKUP:      Stress Test:  2009 Normal    ALLERGIES:    morphine (Unknown)  penicillin (Unknown)  sulfa drugs (Unknown)       MEDICATIONS  (STANDING):  aspirin  chewable 81 milliGRAM(s) Oral daily  insulin lispro (HumaLOG) corrective regimen sliding scale   SubCutaneous three times a day before meals  lidocaine   Patch 1 Patch Transdermal every 24 hours  losartan 50 milliGRAM(s) Oral daily  montelukast 10 milliGRAM(s) Oral daily  sertraline 100 milliGRAM(s) Oral daily  simvastatin 20 milliGRAM(s) Oral at bedtime    MEDICATIONS  (PRN):  oxyCODONE    5 mG/acetaminophen 325 mG 1 Tablet(s) Oral every 6 hours PRN Severe Pain (7 - 10)      FAMILY HISTORY:  Family history of early CAD (Child)        SOCIAL HISTORY:  Nonsmoker. No ETOH abuse. No illicit drugs.     ROS:     Detailed ten system ROS was performed and was negative except for history as eluded to above.    no fever  no chills  + fatigue  no nausea  no vomiting  no diarrhea  no constipation  no melena  no hematochezia  no chest pain  no palpitations  no sob at rest  no dyspnea on exertion  no cough  no wheezing  no anorexia  no headache  + dizziness  no syncope  + weakness  no myalgia  no dysuria  no polyuria  no hematuria     Vital Signs Last 24 Hrs  T(C): 36.7 (20 Apr 2018 06:33), Max: 36.8 (19 Apr 2018 15:30)  T(F): 98.1 (20 Apr 2018 06:33), Max: 98.2 (19 Apr 2018 15:30)  HR: 72 (20 Apr 2018 06:33) (72 - 81)  BP: 135/59 (20 Apr 2018 06:33) (135/59 - 167/73)  BP(mean): --  RR: 16 (20 Apr 2018 06:33) (16 - 18)  SpO2: 100% (20 Apr 2018 04:48) (100% - 100%)    I&O's Summary      PHYSICAL EXAM:    General:                Comfortable, AAO X 3, in no distress.  HEENT:                  Atraumatic, PERRLA, EOMI, conjunctiva clear.   Neck:                     Supple, no adenopathy, no thyromegaly, no JVD, no bruit.  Back:                     Symmetric, non tender.  Chest:                    Clear, B/L symmetric air entry, no tachypnea  Heart:                     S1, S2 normal, no gallop, + murmur.  Abdomen:              Soft, non-tender, bowel sounds active. No palpable masses.  Extremities:           no cyanosis, no edema. Peripheral pulses normal.  Skin:                      Skin color, texture normal. No rashes.  Neurologic:            Grossly nonfocal.      TELEMETRY:   Second deg AV block Type 2    ECG:  NSR, LBBB    LABS:                        11.4   8.77  )-----------( 395      ( 18 Apr 2018 21:54 )             34.9     04-19    144  |  109<H>  |  28<H>  ----------------------------<  80  4.7   |  27  |  1.06    Ca    9.2      19 Apr 2018 12:21    TPro  8.1  /  Alb  3.3  /  TBili  0.2  /  DBili  x   /  AST  10<L>  /  ALT  12  /  AlkPhos  89  04-19    Lipid Panel  Chl 145  HDL 60  LDL 73  Trg 61      04-19 @ 16:23  Trop-I  <0.015    04-19 @ 12:21  Trop-I  <0.015    04-18 @ 22:44  Trop-I  <0.015  CK      109    D Dimer  525 04-18 @ 21:54    Thyroid Stimulating Hormone, Serum (04.19.18 @ 12:21)    Thyroid Stimulating Hormone, Serum: 2.280 uU/mL    RADIOLOGY & ADDITIONAL STUDIES:    CT Angio Chest PE Protocol w/ IV Cont (04.18.18 @ 23:54) >  IMPRESSION: Negative for pulmonary emboli. No evidence of pneumonia.    Xray Chest 1 View AP/PA. (04.18.18 @ 22:04) >  IMPRESSION:  Clear lungs

## 2018-04-20 NOTE — PROGRESS NOTE ADULT - SUBJECTIVE AND OBJECTIVE BOX
Pt seen chart reviewed  CC: back pain    HPI: 67 year old Woman with pmhx of T2DM, HTN, polio (b/l LE atrophy) admitted with cc of (R) sided back pain around the shoulder blade past 2 weeks. Tried to alleviate her back pain with NSAIDs - without any resolution of pain. Patient states that, her back pain worse with motion / radiating occasionally towards her (R) upper extremity and deep breaths.  While in the ED - EKG demonstrated QRS prolongation with LBBB. In addition, tele demonstrating episodes of dropped QRS complexes without prolongation of [place] interval. Patient chest pain free. Denies any jaw pain, shoulder pain, diaphoresis or shortness of breath. Patient's risk factors include - DM /  HTN.     SUBJECTIVE: now s/p PPM; has a headache 9/10; no CP/palpitations    VITALS:  Vital Signs Last 24 Hrs  T(C): 36.2 (20 Apr 2018 14:30), Max: 36.8 (19 Apr 2018 15:30)  T(F): 97.2 (20 Apr 2018 14:30), Max: 98.2 (19 Apr 2018 15:30)  HR: 85 (20 Apr 2018 14:30) (72 - 96)  BP: 141/80 (20 Apr 2018 14:30) (113/44 - 167/73)  BP(mean): --  RR: 16 (20 Apr 2018 14:30) (16 - 18)  SpO2: 99% (20 Apr 2018 14:30) (98% - 100%)    PHYSICAL EXAM:  HEENT:  pupils equal and reactive, EOMI, no oropharyngeal lesions, erythema, exudates, oral thrush  NECK:   supple, no carotid bruits, no palpable lymph nodes, no thyromegaly  CV:  +S1, +S2, regular, no murmurs or rubs; Left pectoral PPM insertion site intact with dermabond, no bruise/bleeding/hematoma  RESP:   lungs clear to auscultation bilaterally, no wheezing, rales, rhonchi, good air entry bilaterally  ISABELA:  abdomen obese, soft, non-tender, non-distended, normal BS, no bruits, no abdominal masses, no palpable masses  MSK/EXT: BLE flaccid, RLE externally rotated w/ hx of polio, BUE normal muscle tone, no atrophy, no rigidity, no contractions, no c/c/e  VASCULAR:  pulses equal and symmetric in the upper and lower extremities  NEURO:  AAOX3, no focal neurological deficits, follows all commands, able to move extremities spontaneously  SKIN:  no ulcers, lesions or rashes      LABS                  11.4   8.77  )-----------( 395      ( 18 Apr 2018 21:54 )             34.9     04-19    144  |  109<H>  |  28<H>  ----------------------------<  80  4.7   |  27  |  1.06    Ca    9.2      19 Apr 2018 12:21    TPro  8.1  /  Alb  3.3  /  TBili  0.2  /  DBili  x   /  AST  10<L>  /  ALT  12  /  AlkPhos  89  04-19    CARDIAC MARKERS ( 19 Apr 2018 16:23 )  <0.015 ng/mL / x     / x     / x     / x      CARDIAC MARKERS ( 19 Apr 2018 12:21 )  <0.015 ng/mL / x     / x     / x     / x      CARDIAC MARKERS ( 18 Apr 2018 22:44 )  <0.015 ng/mL / x     / 109 U/L / x     / x          LIVER FUNCTIONS - ( 19 Apr 2018 12:21 )  Alb: 3.3 g/dL / Pro: 8.1 gm/dL / ALK PHOS: 89 U/L / ALT: 12 U/L / AST: 10 U/L / GGT: x             RADIOLOGY / NUCLEAR MEDICINE    < from: CT Angio Chest PE Protocol w/ IV Cont (04.18.18 @ 23:54) >  IMPRESSION:    Negative for pulmonary emboli.  No evidence of pneumonia.    < end of copied text >    < from: Transthoracic Echocardiogram (04.19.18 @ 14:47) >   Summary     Normal appearing mitral valve structure and function.   Mild (1+) mitral regurgitation is present.   EA reversal of the mitral inflow consistent with reduced compliance of   the left ventricle.  Normal aortic valve structure and function.   Normal appearing tricuspid valve structureand function.   Mild (1+) tricuspid valve regurgitation is present.   The left ventricle is normal in size, wall thickness, wall motion and   contractility.  Estimated left ventricular ejection fraction is 55 %.   Normal appearing right ventricle structure and function.    < end of copied text >      MEDICATIONS    (STANDING):  aspirin  chewable 81 milliGRAM(s) Oral daily  insulin lispro (HumaLOG) corrective regimen sliding scale   SubCutaneous three times a day before meals  lidocaine   Patch 1 Patch Transdermal every 24 hours  losartan 50 milliGRAM(s) Oral daily  montelukast 10 milliGRAM(s) Oral daily  sertraline 100 milliGRAM(s) Oral daily  simvastatin 20 milliGRAM(s) Oral at bedtime    MEDICATIONS  (PRN):  oxyCODONE    5 mG/acetaminophen 325 mG 1 Tablet(s) Oral every 6 hours PRN Severe Pain (7 - 10) Pt seen chart reviewed  CC: back pain    HPI: 67 year old Woman with pmhx of T2DM, HTN, polio (b/l LE atrophy) admitted with cc of (R) sided back pain around the shoulder blade past 2 weeks. Tried to alleviate her back pain with NSAIDs - without any resolution of pain. Patient states that, her back pain worse with motion / radiating occasionally towards her (R) upper extremity and deep breaths.  While in the ED - EKG demonstrated QRS prolongation with LBBB. In addition, tele demonstrating episodes of dropped QRS complexes without prolongation of [place] interval. Patient chest pain free. Denies any jaw pain, shoulder pain, diaphoresis or shortness of breath. Patient's risk factors include - DM /  HTN.     SUBJECTIVE: now s/p PPM; has a headache 9/10; no CP/palpitations    REVIEW OF SYSTEMS: All other review of systems is negative unless indicated above.    VITALS:  Vital Signs Last 24 Hrs  T(C): 36.2 (20 Apr 2018 14:30), Max: 36.8 (19 Apr 2018 15:30)  T(F): 97.2 (20 Apr 2018 14:30), Max: 98.2 (19 Apr 2018 15:30)  HR: 85 (20 Apr 2018 14:30) (72 - 96)  BP: 141/80 (20 Apr 2018 14:30) (113/44 - 167/73)  BP(mean): --  RR: 16 (20 Apr 2018 14:30) (16 - 18)  SpO2: 99% (20 Apr 2018 14:30) (98% - 100%)    PHYSICAL EXAM:  HEENT:  pupils equal and reactive, EOMI, no oropharyngeal lesions, erythema, exudates, oral thrush  NECK:   supple, no carotid bruits, no palpable lymph nodes, no thyromegaly  CV:  +S1, +S2, regular, no murmurs or rubs; Left pectoral PPM insertion site intact with dermabond, no bruise/bleeding/hematoma  RESP:   lungs clear to auscultation bilaterally, no wheezing, rales, rhonchi, good air entry bilaterally  ISABELA:  abdomen obese, soft, non-tender, non-distended, normal BS, no bruits, no abdominal masses, no palpable masses  MSK/EXT: BLE flaccid, RLE externally rotated w/ hx of polio, BUE normal muscle tone, no atrophy, no rigidity, no contractions, no c/c/e  VASCULAR:  pulses equal and symmetric in the upper and lower extremities  NEURO:  AAOX3, no focal neurological deficits, follows all commands, able to move extremities spontaneously  SKIN:  no ulcers, lesions or rashes      LABS                  11.4   8.77  )-----------( 395      ( 18 Apr 2018 21:54 )             34.9     04-19    144  |  109<H>  |  28<H>  ----------------------------<  80  4.7   |  27  |  1.06    Ca    9.2      19 Apr 2018 12:21    TPro  8.1  /  Alb  3.3  /  TBili  0.2  /  DBili  x   /  AST  10<L>  /  ALT  12  /  AlkPhos  89  04-19    CARDIAC MARKERS ( 19 Apr 2018 16:23 )  <0.015 ng/mL / x     / x     / x     / x      CARDIAC MARKERS ( 19 Apr 2018 12:21 )  <0.015 ng/mL / x     / x     / x     / x      CARDIAC MARKERS ( 18 Apr 2018 22:44 )  <0.015 ng/mL / x     / 109 U/L / x     / x          LIVER FUNCTIONS - ( 19 Apr 2018 12:21 )  Alb: 3.3 g/dL / Pro: 8.1 gm/dL / ALK PHOS: 89 U/L / ALT: 12 U/L / AST: 10 U/L / GGT: x             RADIOLOGY / NUCLEAR MEDICINE    < from: CT Angio Chest PE Protocol w/ IV Cont (04.18.18 @ 23:54) >  IMPRESSION:    Negative for pulmonary emboli.  No evidence of pneumonia.    < end of copied text >    < from: Transthoracic Echocardiogram (04.19.18 @ 14:47) >   Summary     Normal appearing mitral valve structure and function.   Mild (1+) mitral regurgitation is present.   EA reversal of the mitral inflow consistent with reduced compliance of   the left ventricle.  Normal aortic valve structure and function.   Normal appearing tricuspid valve structureand function.   Mild (1+) tricuspid valve regurgitation is present.   The left ventricle is normal in size, wall thickness, wall motion and   contractility.  Estimated left ventricular ejection fraction is 55 %.   Normal appearing right ventricle structure and function.    < end of copied text >      MEDICATIONS    (STANDING):  aspirin  chewable 81 milliGRAM(s) Oral daily  insulin lispro (HumaLOG) corrective regimen sliding scale   SubCutaneous three times a day before meals  lidocaine   Patch 1 Patch Transdermal every 24 hours  losartan 50 milliGRAM(s) Oral daily  montelukast 10 milliGRAM(s) Oral daily  sertraline 100 milliGRAM(s) Oral daily  simvastatin 20 milliGRAM(s) Oral at bedtime    MEDICATIONS  (PRN):  oxyCODONE    5 mG/acetaminophen 325 mG 1 Tablet(s) Oral every 6 hours PRN Severe Pain (7 - 10)

## 2018-04-20 NOTE — PROGRESS NOTE ADULT - ASSESSMENT
Cardiac Arrythmia: LBBB with episodes of bradycardia to 30's/ Mobitz type II HB  - CE's negative  - appreciate EP input --> she's now s/p PPM implant  - monitor per post PPM protocol  ** pt and dtr "Crystal" at bedside educated on activity restrictions  - con't ASA/ STATIN     Back pain   - she has chronic back pain in setting of polio  - con't Lidoderm patch  - outpatient workup suggested.    HTN - controlled  continue with losartan.     ? T2DM - controlled  - A1c 5.4 %  - con't low carb/ no concentrated sweets diet     Polio  - patient is wheelchair bound  - con't supportive care Cardiac Arrythmia: LBBB with episodes of bradycardia to 30's/ Mobitz type II HB  - CE's negative  - appreciate EP input --> she's now s/p PPM implant  - monitor per post PPM protocol  ** pt and dtr "Natty" at bedside educated on activity restrictions  - con't ASA/ STATIN     Back pain   - she has chronic back pain in setting of polio  - con't Lidoderm patch  - outpatient workup suggested.    HTN - controlled  continue with losartan.     ? T2DM - controlled  - A1c 5.4 %  - con't low carb/ no concentrated sweets diet     Polio  - patient is wheelchair bound  - con't supportive care    Attending Statement:  40 minutes spent on total encounter; more than 50% of the visit was spent counseling and/or coordinating care by the attending physician.  Patient seen and examined with ANITA Covington.  Agree with physical exam and assessment and plan.

## 2018-04-21 LAB
ANION GAP SERPL CALC-SCNC: 3 MMOL/L — LOW (ref 5–17)
BASOPHILS # BLD AUTO: 0.02 K/UL — SIGNIFICANT CHANGE UP (ref 0–0.2)
BASOPHILS NFR BLD AUTO: 0.3 % — SIGNIFICANT CHANGE UP (ref 0–2)
BUN SERPL-MCNC: 22 MG/DL — SIGNIFICANT CHANGE UP (ref 7–23)
CALCIUM SERPL-MCNC: 8.7 MG/DL — SIGNIFICANT CHANGE UP (ref 8.5–10.1)
CHLORIDE SERPL-SCNC: 109 MMOL/L — HIGH (ref 96–108)
CO2 SERPL-SCNC: 27 MMOL/L — SIGNIFICANT CHANGE UP (ref 22–31)
CREAT SERPL-MCNC: 0.87 MG/DL — SIGNIFICANT CHANGE UP (ref 0.5–1.3)
EOSINOPHIL # BLD AUTO: 0.23 K/UL — SIGNIFICANT CHANGE UP (ref 0–0.5)
EOSINOPHIL NFR BLD AUTO: 3.1 % — SIGNIFICANT CHANGE UP (ref 0–6)
GLUCOSE BLDC GLUCOMTR-MCNC: 128 MG/DL — HIGH (ref 70–99)
GLUCOSE BLDC GLUCOMTR-MCNC: 131 MG/DL — HIGH (ref 70–99)
GLUCOSE BLDC GLUCOMTR-MCNC: 94 MG/DL — SIGNIFICANT CHANGE UP (ref 70–99)
GLUCOSE SERPL-MCNC: 89 MG/DL — SIGNIFICANT CHANGE UP (ref 70–99)
HCT VFR BLD CALC: 29.6 % — LOW (ref 34.5–45)
HCT VFR BLD CALC: 29.8 % — LOW (ref 34.5–45)
HGB BLD-MCNC: 9.6 G/DL — LOW (ref 11.5–15.5)
HGB BLD-MCNC: 9.6 G/DL — LOW (ref 11.5–15.5)
IMM GRANULOCYTES NFR BLD AUTO: 0.3 % — SIGNIFICANT CHANGE UP (ref 0–1.5)
LYMPHOCYTES # BLD AUTO: 1.93 K/UL — SIGNIFICANT CHANGE UP (ref 1–3.3)
LYMPHOCYTES # BLD AUTO: 25.8 % — SIGNIFICANT CHANGE UP (ref 13–44)
MCHC RBC-ENTMCNC: 30 PG — SIGNIFICANT CHANGE UP (ref 27–34)
MCHC RBC-ENTMCNC: 32.2 GM/DL — SIGNIFICANT CHANGE UP (ref 32–36)
MCV RBC AUTO: 93.1 FL — SIGNIFICANT CHANGE UP (ref 80–100)
MONOCYTES # BLD AUTO: 0.48 K/UL — SIGNIFICANT CHANGE UP (ref 0–0.9)
MONOCYTES NFR BLD AUTO: 6.4 % — SIGNIFICANT CHANGE UP (ref 2–14)
NEUTROPHILS # BLD AUTO: 4.79 K/UL — SIGNIFICANT CHANGE UP (ref 1.8–7.4)
NEUTROPHILS NFR BLD AUTO: 64.1 % — SIGNIFICANT CHANGE UP (ref 43–77)
NRBC # BLD: 0 /100 WBCS — SIGNIFICANT CHANGE UP (ref 0–0)
PLATELET # BLD AUTO: 305 K/UL — SIGNIFICANT CHANGE UP (ref 150–400)
POTASSIUM SERPL-MCNC: 5 MMOL/L — SIGNIFICANT CHANGE UP (ref 3.5–5.3)
POTASSIUM SERPL-SCNC: 5 MMOL/L — SIGNIFICANT CHANGE UP (ref 3.5–5.3)
RBC # BLD: 3.2 M/UL — LOW (ref 3.8–5.2)
RBC # FLD: 15.2 % — HIGH (ref 10.3–14.5)
SODIUM SERPL-SCNC: 139 MMOL/L — SIGNIFICANT CHANGE UP (ref 135–145)
WBC # BLD: 7.47 K/UL — SIGNIFICANT CHANGE UP (ref 3.8–10.5)
WBC # FLD AUTO: 7.47 K/UL — SIGNIFICANT CHANGE UP (ref 3.8–10.5)

## 2018-04-21 PROCEDURE — 93010 ELECTROCARDIOGRAM REPORT: CPT

## 2018-04-21 RX ORDER — KETOROLAC TROMETHAMINE 30 MG/ML
30 SYRINGE (ML) INJECTION ONCE
Qty: 0 | Refills: 0 | Status: DISCONTINUED | OUTPATIENT
Start: 2018-04-21 | End: 2018-04-21

## 2018-04-21 RX ORDER — METOPROLOL TARTRATE 50 MG
25 TABLET ORAL
Qty: 0 | Refills: 0 | Status: DISCONTINUED | OUTPATIENT
Start: 2018-04-21 | End: 2018-04-22

## 2018-04-21 RX ORDER — NITROGLYCERIN 6.5 MG
0.4 CAPSULE, EXTENDED RELEASE ORAL
Qty: 0 | Refills: 0 | Status: DISCONTINUED | OUTPATIENT
Start: 2018-04-21 | End: 2018-04-23

## 2018-04-21 RX ADMIN — Medication 25 MILLIGRAM(S): at 11:29

## 2018-04-21 RX ADMIN — SIMVASTATIN 20 MILLIGRAM(S): 20 TABLET, FILM COATED ORAL at 20:50

## 2018-04-21 RX ADMIN — LIDOCAINE 1 PATCH: 4 CREAM TOPICAL at 13:35

## 2018-04-21 RX ADMIN — Medication 25 MILLIGRAM(S): at 17:30

## 2018-04-21 RX ADMIN — LOSARTAN POTASSIUM 50 MILLIGRAM(S): 100 TABLET, FILM COATED ORAL at 06:02

## 2018-04-21 RX ADMIN — LIDOCAINE 1 PATCH: 4 CREAM TOPICAL at 03:00

## 2018-04-21 RX ADMIN — Medication 30 MILLIGRAM(S): at 02:14

## 2018-04-21 RX ADMIN — OXYCODONE AND ACETAMINOPHEN 1 TABLET(S): 5; 325 TABLET ORAL at 19:31

## 2018-04-21 RX ADMIN — Medication 81 MILLIGRAM(S): at 11:30

## 2018-04-21 RX ADMIN — Medication 650 MILLIGRAM(S): at 20:51

## 2018-04-21 RX ADMIN — MONTELUKAST 10 MILLIGRAM(S): 4 TABLET, CHEWABLE ORAL at 20:50

## 2018-04-21 RX ADMIN — OXYCODONE AND ACETAMINOPHEN 1 TABLET(S): 5; 325 TABLET ORAL at 01:05

## 2018-04-21 RX ADMIN — Medication 30 MILLILITER(S): at 16:56

## 2018-04-21 RX ADMIN — SERTRALINE 100 MILLIGRAM(S): 25 TABLET, FILM COATED ORAL at 11:29

## 2018-04-21 NOTE — PROGRESS NOTE ADULT - SUBJECTIVE AND OBJECTIVE BOX
68 y/o/f with extensive pmhx of type II DM, HTN, polio (b/l LE atrophy) admitted with cc of (R) sided back pain around the shoulder blade past 2 weeks. Tried to alleviate her back pain with NSAIDs - without any resolution of pain. Patient states that, her back pain worse with motion / radiating occasionally towards her (R) upper extremity. Also worse with deep breaths. While in the ED - EKG demonstrated QRS prolongation with LBBB. In addition, tele demonstrating episodes of dropped QRS complexes without prolongation of [place] interval. Patient chest pain free. Denies any jaw pain, shoulder pain, diaphoresis or shortness of breath. Patient's risk factors include - DM /  HTN. She has been fatigued. C/o generalized weakness. No syncope. Feels dizzy    Today, c/o chest pain and back pain. Tightness in the lower chest that lasted for 15 - 20 minutes.     PAST MEDICAL & SURGICAL HISTORY:  Polio  HTN (hypertension)  DM (diabetes mellitus)  No significant past surgical history    CARDIAC WORKUP:    Echo 4/19/18: Nml EF, 1+ MR, 1+ TR. Diastolic dysfunction  Stress Test:  2009 Normal      Allergies:   morphine (Unknown)  penicillin (Unknown)  sulfa drugs (Unknown)      MEDICATIONS  (STANDING):  aspirin  chewable 81 milliGRAM(s) Oral daily  insulin lispro (HumaLOG) corrective regimen sliding scale   SubCutaneous three times a day before meals  lidocaine   Patch 1 Patch Transdermal every 24 hours  losartan 50 milliGRAM(s) Oral daily  montelukast 10 milliGRAM(s) Oral daily  sertraline 100 milliGRAM(s) Oral daily  simvastatin 20 milliGRAM(s) Oral at bedtime    MEDICATIONS  (PRN):  acetaminophen   Tablet. 650 milliGRAM(s) Oral every 6 hours PRN Mild Pain (1 - 3)  oxyCODONE    5 mG/acetaminophen 325 mG 1 Tablet(s) Oral every 6 hours PRN Severe Pain (7 - 10)      ROS:     Detailed ten system ROS was performed and was negative except for history as eluded to above.    no fever  no chills  no nausea  no vomiting  no diarrhea  no constipation  no melena  no hematochezia  + chest pain  no palpitations  no sob at rest  no dyspnea on exertion  no cough  no wheezing  no anorexia  no headache  no dizziness  no syncope  no weakness  no myalgia  no dysuria  no polyuria  no hematuria       Vital Signs Last 24 Hrs  T(C): 36.6 (21 Apr 2018 05:01), Max: 36.7 (20 Apr 2018 17:02)  T(F): 97.8 (21 Apr 2018 05:01), Max: 98.1 (20 Apr 2018 17:02)  HR: 83 (21 Apr 2018 05:01) (72 - 96)  BP: 114/76 (21 Apr 2018 05:01) (113/44 - 141/80)  BP(mean): --  RR: 20 (21 Apr 2018 05:01) (16 - 20)  SpO2: 98% (21 Apr 2018 05:01) (98% - 100%)      PHYSICAL EXAM:    General:                Comfortable, AAO X 3, in no distress.  HEENT:                  Atraumatic, PERRLA, EOMI, conjunctiva clear.    Neck:                     Supple, no adenopathy, no thyromegaly, no JVD, no bruit.  Back:                     Symmetric, non tender.  Chest:                    Clear, B/L symmetric air entry, no tachypnea  Heart:                     S1, S2 normal, no gallop, + murmur.  Abdomen:              Soft, non-tender, bowel sounds active. No palpable masses.  Extremities:           no cyanosis, no edema. Peripheral pulses normal.  Skin:                      Skin color, texture normal. No rashes.  Neurologic:            Grossly nonfocal.       INTERPRETATION OF TELEMETRY:  Sinus, LBBB, intermittent V paced    LABS:                        9.6    7.47  )-----------( 305      ( 21 Apr 2018 05:15 )             29.8     04-21    139  |  109<H>  |  22  ----------------------------<  89  5.0   |  27  |  0.87    Ca    8.7      21 Apr 2018 05:15    TPro  8.1  /  Alb  3.3  /  TBili  0.2  /  DBili  x   /  AST  10<L>  /  ALT  12  /  AlkPhos  89  04-19      Lipid Panel  Chl 145  HDL 60  LDL 73  Trg 61      RADIOLOGY & ADDITIONAL STUDIES:    Xray Chest 1 View- PORTABLE-Urgent (04.20.18 @ 13:12) > IMPRESSION: Status post left chest wall dual-lead pacemaker placement. No pneumothorax.

## 2018-04-21 NOTE — PROGRESS NOTE ADULT - ASSESSMENT
67 yr old female w/ obesity, HTN, DM and polio who presented w/ back pain and was noted to have intermittent LBBB on telemetry as well as episodes of 2:1 AV block w/ narrow and wide complex conduction. S/P dual chamber PPM     -Continue telemetry monitoring  -Continue current medications  - Possible out pt ischemia evaluation after pacemaker.

## 2018-04-21 NOTE — PHYSICAL THERAPY INITIAL EVALUATION ADULT - ACTIVE RANGE OF MOTION EXAMINATION, REHAB EVAL
no Active ROM deficits were identified/Left shoulder flex only assessed to 90 degrees secondary to PPM placement. Bilateral hip flex ~90 degrees and bilateral DF neutral.

## 2018-04-21 NOTE — PHYSICAL THERAPY INITIAL EVALUATION ADULT - PRECAUTIONS/LIMITATIONS, REHAB EVAL
SD/CCU/cardiac precautions tele/cardiac precautions tele/hearing precautions/cardiac precautions/fall precautions

## 2018-04-21 NOTE — PROGRESS NOTE ADULT - ASSESSMENT
Chest pain. Dyspnea. Unstable angina  s/p PPM  Anemia - acute drop in H/H  HTN  DM  HLD      Suggest:    Ischemia evaluation - Based on pt's symptoms, history, risk factors, exam, lab and EKG data I have advised pt have a cardiac catheterization and possible percutaneous coronary intervention. Procedure, its risks, alternatives, benefits and potential complications were discussed in detail. Risks include but not limited to bleeding, infection, allergy, renal failure requiring dialysis, stroke, vascular injury, pericardial tamponade, arrhythmias, MI and even death. Pt is agreeable and has consented for the procedure and the procedure is being scheduled for Monday.   Add low dose beta blockers.   Continue losartan for HTN  Statins  DM control  Check H/H  Check FOB

## 2018-04-21 NOTE — PROGRESS NOTE ADULT - SUBJECTIVE AND OBJECTIVE BOX
Pt seen chart reviewed  CC: back pain    HPI: 67 year old Woman with pmhx of T2DM, HTN, polio (b/l LE atrophy) admitted with cc of (R) sided back pain around the shoulder blade past 2 weeks. Tried to alleviate her back pain with NSAIDs - without any resolution of pain. Patient states that, her back pain worse with motion / radiating occasionally towards her (R) upper extremity and deep breaths. While in the ED - EKG demonstrated QRS prolongation with LBBB. In addition, tele demonstrating episodes of dropped QRS complexes without prolongation of CO interval. Patient chest pain free. Denies any jaw pain, shoulder pain, diaphoresis or shortness of breath. Patient's risk factors include - DM /  HTN.   Pt was admitted and PPM was placed for second degree heart block type II.  She is POD 1 and doing well.  Hospital course complicated by episode of chest pain/heaviness last night.  She was evaluated by cardiology and is being medically optimized for CAD and angina.  She is to go for cardiac cath Monday.  At bedside she has chronic back pain but currently denying CP.      REVIEW OF SYSTEMS: All other review of systems is negative unless indicated above.    Vital Signs Last 24 Hrs  T(C): 36.6 (21 Apr 2018 11:12), Max: 36.7 (20 Apr 2018 17:02)  T(F): 97.8 (21 Apr 2018 11:12), Max: 98.1 (20 Apr 2018 17:02)  HR: 75 (21 Apr 2018 11:12) (75 - 85)  BP: 122/47 (21 Apr 2018 11:12) (114/76 - 141/80)  BP(mean): --  RR: 18 (21 Apr 2018 11:12) (16 - 20)  SpO2: 100% (21 Apr 2018 11:12) (98% - 100%)    PHYSICAL EXAM:  HEENT:  pupils equal and reactive, EOMI, no oropharyngeal lesions, erythema, exudates, oral thrush  NECK:   supple, no carotid bruits, no palpable lymph nodes, no thyromegaly  CV:  +S1, +S2, regular, no murmurs or rubs; Left pectoral PPM insertion site intact with dermabond, no bruise/bleeding/hematoma  RESP:   lungs clear to auscultation bilaterally, no wheezing, rales, rhonchi, good air entry bilaterally  ISABELA:  abdomen obese, soft, non-tender, non-distended, normal BS, no bruits, no abdominal masses, no palpable masses  MSK/EXT: BLE flaccid, RLE externally rotated w/ hx of polio, BUE normal muscle tone, no atrophy, no rigidity, no contractions, no c/c/e  VASCULAR:  pulses equal and symmetric in the upper and lower extremities  NEURO:  AAOX3, no focal neurological deficits, follows all commands, able to move extremities spontaneously  SKIN:  no ulcers, lesions or rashes    MEDICATIONS  (STANDING):  aspirin  chewable 81 milliGRAM(s) Oral daily  insulin lispro (HumaLOG) corrective regimen sliding scale   SubCutaneous three times a day before meals  lidocaine   Patch 1 Patch Transdermal every 24 hours  losartan 50 milliGRAM(s) Oral daily  metoprolol tartrate 25 milliGRAM(s) Oral two times a day  montelukast 10 milliGRAM(s) Oral daily  sertraline 100 milliGRAM(s) Oral daily  simvastatin 20 milliGRAM(s) Oral at bedtime    MEDICATIONS  (PRN):  acetaminophen   Tablet. 650 milliGRAM(s) Oral every 6 hours PRN Mild Pain (1 - 3)  oxyCODONE    5 mG/acetaminophen 325 mG 1 Tablet(s) Oral every 6 hours PRN Severe Pain (7 - 10)      CARDIAC MARKERS ( 19 Apr 2018 16:23 )  <0.015 ng/mL / x     / x     / x     / x                            9.6    x     )-----------( x        ( 21 Apr 2018 12:36 )             29.6     04-21    139  |  109<H>  |  22  ----------------------------<  89  5.0   |  27  |  0.87    Ca    8.7      21 Apr 2018 05:15      CAPILLARY BLOOD GLUCOSE      POCT Blood Glucose.: 131 mg/dL (21 Apr 2018 11:26)  POCT Blood Glucose.: 94 mg/dL (21 Apr 2018 07:54)  POCT Blood Glucose.: 314 mg/dL (20 Apr 2018 17:27)                Assessment and Plan:  67 year old Woman with pmhx of T2DM, HTN, polio (b/l LE atrophy) admitted with (R) sided back pain found to have LBBB and heart block.    		  LBBB with Mobitz type II HB  -s/p PPM POD 1  - CE's negative  - appreciate EP input   - con't ASA/ STATIN    Chest pain/Angina: Possible unstable  -monitor on tele  -started Beta blocker  -cardiac cath Monday    Back pain: chronic back pain in setting of polio  -pain management  -outpatient workup suggested.  -patient is wheelchair bound  -con't supportive care    HTN: controlled  -c/w losartan  -bb added    Anemia: Stable  -monitor    T2DM: controlled  - A1c 5.4 %  - con't low carb/ no concentrated sweets diet    VTE:  -SCDs for now    Attending Statement:  40 minutes spent on total encounter; more than 50% of the visit was spent counseling and/or coordinating care by the attending physician.

## 2018-04-21 NOTE — CHART NOTE - NSCHARTNOTEFT_GEN_A_CORE
Patient feels and looks well this morning 1 day post PPM implant.    VSS,AFeb  Wound C,D,and Intact without hematoma    Pacer check revealed normal dual chamber function      Impression:  Stable s/p PPM Implant      Recs:    F/U wound check as OP   Full instructions provided

## 2018-04-21 NOTE — PHYSICAL THERAPY INITIAL EVALUATION ADULT - MANUAL MUSCLE TESTING RESULTS, REHAB EVAL
Right UE strength 4/5, left UE shoulder not fully assessed all other 4/5. Bilateral LE strength 3/5. All MMT performed within available ROM.

## 2018-04-21 NOTE — PROGRESS NOTE ADULT - SUBJECTIVE AND OBJECTIVE BOX
Problem: Postpartum (Vaginal Delivery) (Adult,Obstetrics,Pediatric)  Goal: Signs and Symptoms of Listed Potential Problems Will be Absent, Minimized or Managed (Postpartum)  Outcome: Ongoing (interventions implemented as appropriate)         HPI:  Pt is a 66 y/o/f with extensive pmhx of type II DM, HTN, polio (b/l LE atrophy) admitted with cc of (R) sided back pain around the shoulder blade past 2 weeks. Tried to alleviate her back pain with NSAIDs - without any resolution of pain. Patient states that, her back pain worse with motion / radiating occasionally towards her (R) upper extremity and deep breaths. While in the ED - EKG demonstrated QRS prolongation with LBBB. In addition, tele demonstrating episodes of dropped QRS complexes without prolongation of DC interval. Patient chest pain free. Denies any jaw pain, shoulder pain, diaphoresis or shortness of breath. Patient's risk factors include - DM /  HTN.     care discussed with patient's cardiologist Dr. Paige. care also discussed with EP service. (19 Apr 2018 11:47)      SUBJECTIVE: Pt. seen, examined and evaluated Pt. resting comfortably in bed in NAD, with no respiratory distress, no c/o chest pain, dyspnea, palpitations, PND, or orthopnea   Patient is a 67y old  Female who presents with a chief complaint of Back pain (19 Apr 2018 11:47)          OBJECTIVE:  Review Of Systems:  Constitutional: [ ] Fever [ ] Chills [ ] Fatigue [ ] Weight change   HEENT: [ ] Blurred vision [ ] Eye Pain [ ] Headache [ ] Runny nose [ ] Sore Throat   Respiratory: [ ] Cough [ ] Wheezing [ ] Shortness of breath  Cardiovascular: [ ] Chest Pain [ ] Palpitations [ ] CONLEY [ ] PND [ ] Orthopnea  Gastrointestinal: [ ] Abdominal Pain [ ] Diarrhea [ ] Constipation [ ] Hemorrhoids [ ] Nausea [ ] Vomiting  Genitourinary: [ ] Nocturia [ ] Dysuria [ ] Incontinence  Extremities: [ ] Swelling [ ] Joint Pain  Neurologic: [ ] Focal deficit [ ] Paresthesias [ ] Syncope  Lymphatic: [ ] Swelling [ ] Lymphadenopathy   Skin: [ ] Rash [ ] Ecchymoses [ ] Wounds [ ] Lesions  Psychiatry: [ ] Depression [ ] Suicidal/Homicidal Ideation [ ] Anxiety [ ] Sleep Disturbances  [X ] 10 point review of systems is otherwise negative except as mentioned above              [ ]Unable to obtain    Allergy:  morphine (Unknown)  penicillin (Unknown)  sulfa drugs (Unknown)            Medications:  MEDICATIONS  (STANDING):  aspirin  chewable 81 milliGRAM(s) Oral daily  insulin lispro (HumaLOG) corrective regimen sliding scale   SubCutaneous three times a day before meals  lidocaine   Patch 1 Patch Transdermal every 24 hours  losartan 50 milliGRAM(s) Oral daily  montelukast 10 milliGRAM(s) Oral daily  sertraline 100 milliGRAM(s) Oral daily  simvastatin 20 milliGRAM(s) Oral at bedtime    MEDICATIONS  (PRN):  acetaminophen   Tablet. 650 milliGRAM(s) Oral every 6 hours PRN Mild Pain (1 - 3)  oxyCODONE    5 mG/acetaminophen 325 mG 1 Tablet(s) Oral every 6 hours PRN Severe Pain (7 - 10)      PMH/PSH/FH/SH: [ ] Unchanged    Vitals:  T(C): 36.6 (04-21-18 @ 05:01), Max: 36.7 (04-20-18 @ 17:02)  HR: 83 (04-21-18 @ 05:01) (72 - 96)  BP: 114/76 (04-21-18 @ 05:01) (113/44 - 141/80)  BP(mean): --  RR: 20 (04-21-18 @ 05:01) (16 - 20)  SpO2: 98% (04-21-18 @ 05:01) (98% - 100%)  Wt(kg): --  Daily     Daily   I&O's Summary      Labs:                        9.6    7.47  )-----------( 305      ( 21 Apr 2018 05:15 )             29.8     04-21    139  |  109<H>  |  22  ----------------------------<  89  5.0   |  27  |  0.87    Ca    8.7      21 Apr 2018 05:15    TPro  8.1  /  Alb  3.3  /  TBili  0.2  /  DBili  x   /  AST  10<L>  /  ALT  12  /  AlkPhos  89  04-19      CARDIAC MARKERS ( 19 Apr 2018 16:23 )  <0.015 ng/mL / x     / x     / x     / x      CARDIAC MARKERS ( 19 Apr 2018 12:21 )  <0.015 ng/mL / x     / x     / x     / x        ECG: SR @ 77BPM LBBB    Echo: < from: Transthoracic Echocardiogram (04.19.18 @ 14:47) >   Impression     Summary     Normal appearing mitral valve structure and function.   Mild (1+) mitral regurgitation is present.   EA reversal of the mitral inflow consistent with reduced compliance of   the   left ventricle.   Normal aortic valve structure and function.   Normal appearing tricuspid valve structureand function.   Mild (1+) tricuspid valve regurgitation is present.   The left ventricle is normal in size, wall thickness, wall motion and   contractility.   Estimated left ventricular ejection fraction is 55 %.   Normal appearing right ventricle structure and function.    Imaging: < from: Xray Chest 1 View- PORTABLE-Urgent (04.20.18 @ 13:12) >  IMPRESSION: Status post left chest wall dual-lead pacemaker placement. No   pneumothorax.    Interpretation of Telemetry: SR 80's with LBBB noted       Physical Exam:  Appearance: [ ] Normal  [ ] abnormal [X ] NAD   Eyes: [ ] PERRL [ ] EOMI  HENT: [ ] Normal [ ] Abnormal oral mucosa [ ]NC/AT  Cardiovascular: [X ] S1 [X ] S2 [ ] RRR [ ] m/r/g [ ]edema [ ] JVP  Procedural Access Site: left chestwall benign no hematoma no bleeding RUBI  Respiratory: [X ] expiratory wheeze noted  Gastrointestinal: [ ] Soft [ ] tenderness[ ] distension [ ] BS  Musculoskeletal: [ ] clubbing [ ] joint deformity   Neurologic: [ ] Non-focal  Lymphatic: [ ] lymphadenopathy  Psychiatry: [X ] AAOx3  [ ] confused [ ] disoriented [ ] Mood & affect appropriate  Skin: [ ]  rashes [ ] ecchymoses [ ] cyanosis

## 2018-04-21 NOTE — PHYSICAL THERAPY INITIAL EVALUATION ADULT - PERTINENT HX OF CURRENT PROBLEM, REHAB EVAL
Pt admitted to  secondary to LBP. In ER pt found to have EKG findings QRS prolongation with LBBB. H/H-9.6/29.8, CT angio-neg for PE.

## 2018-04-22 LAB
ADD ON TEST-SPECIMEN IN LAB: SIGNIFICANT CHANGE UP
ANION GAP SERPL CALC-SCNC: 5 MMOL/L — SIGNIFICANT CHANGE UP (ref 5–17)
BUN SERPL-MCNC: 24 MG/DL — HIGH (ref 7–23)
CALCIUM SERPL-MCNC: 9 MG/DL — SIGNIFICANT CHANGE UP (ref 8.5–10.1)
CHLORIDE SERPL-SCNC: 105 MMOL/L — SIGNIFICANT CHANGE UP (ref 96–108)
CO2 SERPL-SCNC: 30 MMOL/L — SIGNIFICANT CHANGE UP (ref 22–31)
CREAT SERPL-MCNC: 0.82 MG/DL — SIGNIFICANT CHANGE UP (ref 0.5–1.3)
FERRITIN SERPL-MCNC: 47 NG/ML — SIGNIFICANT CHANGE UP (ref 15–150)
FOLATE SERPL-MCNC: 12.7 NG/ML — SIGNIFICANT CHANGE UP
GLUCOSE BLDC GLUCOMTR-MCNC: 118 MG/DL — HIGH (ref 70–99)
GLUCOSE BLDC GLUCOMTR-MCNC: 148 MG/DL — HIGH (ref 70–99)
GLUCOSE BLDC GLUCOMTR-MCNC: 153 MG/DL — HIGH (ref 70–99)
GLUCOSE SERPL-MCNC: 150 MG/DL — HIGH (ref 70–99)
HCT VFR BLD CALC: 31 % — LOW (ref 34.5–45)
HGB BLD-MCNC: 10.1 G/DL — LOW (ref 11.5–15.5)
MCHC RBC-ENTMCNC: 30.3 PG — SIGNIFICANT CHANGE UP (ref 27–34)
MCHC RBC-ENTMCNC: 32.6 GM/DL — SIGNIFICANT CHANGE UP (ref 32–36)
MCV RBC AUTO: 93.1 FL — SIGNIFICANT CHANGE UP (ref 80–100)
NRBC # BLD: 0 /100 WBCS — SIGNIFICANT CHANGE UP (ref 0–0)
PLATELET # BLD AUTO: 312 K/UL — SIGNIFICANT CHANGE UP (ref 150–400)
POTASSIUM SERPL-MCNC: 5.4 MMOL/L — HIGH (ref 3.5–5.3)
POTASSIUM SERPL-SCNC: 5.4 MMOL/L — HIGH (ref 3.5–5.3)
RBC # BLD: 3.33 M/UL — LOW (ref 3.8–5.2)
RBC # FLD: 15.2 % — HIGH (ref 10.3–14.5)
SODIUM SERPL-SCNC: 140 MMOL/L — SIGNIFICANT CHANGE UP (ref 135–145)
TROPONIN I SERPL-MCNC: 0.04 NG/ML — SIGNIFICANT CHANGE UP (ref 0.01–0.04)
VIT B12 SERPL-MCNC: 317 PG/ML — SIGNIFICANT CHANGE UP (ref 232–1245)
WBC # BLD: 8.08 K/UL — SIGNIFICANT CHANGE UP (ref 3.8–10.5)
WBC # FLD AUTO: 8.08 K/UL — SIGNIFICANT CHANGE UP (ref 3.8–10.5)

## 2018-04-22 PROCEDURE — 72131 CT LUMBAR SPINE W/O DYE: CPT | Mod: 26

## 2018-04-22 PROCEDURE — 72128 CT CHEST SPINE W/O DYE: CPT | Mod: 26

## 2018-04-22 PROCEDURE — 93010 ELECTROCARDIOGRAM REPORT: CPT | Mod: 76

## 2018-04-22 RX ORDER — METOPROLOL TARTRATE 50 MG
50 TABLET ORAL
Qty: 0 | Refills: 0 | Status: DISCONTINUED | OUTPATIENT
Start: 2018-04-22 | End: 2018-04-23

## 2018-04-22 RX ORDER — OXYCODONE HYDROCHLORIDE 5 MG/1
10 TABLET ORAL EVERY 4 HOURS
Qty: 0 | Refills: 0 | Status: DISCONTINUED | OUTPATIENT
Start: 2018-04-22 | End: 2018-04-23

## 2018-04-22 RX ORDER — OXYCODONE HYDROCHLORIDE 5 MG/1
5 TABLET ORAL EVERY 4 HOURS
Qty: 0 | Refills: 0 | Status: DISCONTINUED | OUTPATIENT
Start: 2018-04-22 | End: 2018-04-23

## 2018-04-22 RX ORDER — ACETAMINOPHEN 500 MG
1000 TABLET ORAL ONCE
Qty: 0 | Refills: 0 | Status: DISCONTINUED | OUTPATIENT
Start: 2018-04-22 | End: 2018-04-23

## 2018-04-22 RX ORDER — METHOCARBAMOL 500 MG/1
1000 TABLET, FILM COATED ORAL EVERY 8 HOURS
Qty: 0 | Refills: 0 | Status: DISCONTINUED | OUTPATIENT
Start: 2018-04-22 | End: 2018-04-22

## 2018-04-22 RX ADMIN — SERTRALINE 100 MILLIGRAM(S): 25 TABLET, FILM COATED ORAL at 11:38

## 2018-04-22 RX ADMIN — Medication 50 MILLIGRAM(S): at 17:51

## 2018-04-22 RX ADMIN — OXYCODONE HYDROCHLORIDE 10 MILLIGRAM(S): 5 TABLET ORAL at 17:51

## 2018-04-22 RX ADMIN — MONTELUKAST 10 MILLIGRAM(S): 4 TABLET, CHEWABLE ORAL at 11:38

## 2018-04-22 RX ADMIN — Medication 1: at 17:51

## 2018-04-22 RX ADMIN — OXYCODONE AND ACETAMINOPHEN 1 TABLET(S): 5; 325 TABLET ORAL at 00:11

## 2018-04-22 RX ADMIN — LOSARTAN POTASSIUM 50 MILLIGRAM(S): 100 TABLET, FILM COATED ORAL at 06:12

## 2018-04-22 RX ADMIN — LIDOCAINE 1 PATCH: 4 CREAM TOPICAL at 13:19

## 2018-04-22 RX ADMIN — Medication 25 MILLIGRAM(S): at 06:12

## 2018-04-22 RX ADMIN — OXYCODONE HYDROCHLORIDE 10 MILLIGRAM(S): 5 TABLET ORAL at 12:22

## 2018-04-22 RX ADMIN — Medication 81 MILLIGRAM(S): at 11:38

## 2018-04-22 RX ADMIN — LIDOCAINE 1 PATCH: 4 CREAM TOPICAL at 01:04

## 2018-04-22 RX ADMIN — SIMVASTATIN 20 MILLIGRAM(S): 20 TABLET, FILM COATED ORAL at 22:41

## 2018-04-22 RX ADMIN — OXYCODONE AND ACETAMINOPHEN 1 TABLET(S): 5; 325 TABLET ORAL at 06:12

## 2018-04-22 RX ADMIN — OXYCODONE HYDROCHLORIDE 10 MILLIGRAM(S): 5 TABLET ORAL at 21:51

## 2018-04-22 NOTE — CONSULT NOTE ADULT - SUBJECTIVE AND OBJECTIVE BOX
Neurosurgery Consult Note     Patient is a 67y old  Female who presents with a chief complaint of Back pain (19 Apr 2018 11:47)      HPI:  Pt is a 68 y/o/f with extensive past medical history of Type II DM, HTN, Polio wheelchair bound (b/l LE atrophy) admitted with cc of (R) sided back pain around the shoulder blade past 2 weeks. Tried to alleviate her back pain with NSAIDs - without any resolution of pain. Patient states that, her back pain worse with motion / radiating occasionally towards her (R) upper extremity and deep breaths. While in the ED - EKG demonstrated QRS prolongation with LBBB. In addition, tele demonstrating episodes of dropped QRS complexes without prolongation of SD interval. Patient chest pain free. Denies any jaw pain, shoulder pain, diaphoresis.  Patient's risk factors include - DM /  HTN/ obesity.     She just had Pacemaker placement on admission 2 days ago and can not have an MRI   We are called for the back Pain . She is seen and examined . She reports 3 weeks of midback pain no radiculopathy , no paresthesias , after a Long car ride to [place] on March 16th .     PAST MEDICAL & SURGICAL HISTORY:  Polio at age 5   HTN (hypertension)  DM (diabetes mellitus)  No significant past surgical history      Psoc Hx : Lives with daughter     FHx:    Allergies    morphine (Unknown)  penicillin (Unknown)  sulfa drugs (Unknown)      MEDICATIONS  (STANDING):  acetaminophen  IVPB. 1000 milliGRAM(s) IV Intermittent once  aspirin  chewable 81 milliGRAM(s) Oral daily  insulin lispro (HumaLOG) corrective regimen sliding scale   SubCutaneous three times a day before meals  lidocaine   Patch 1 Patch Transdermal every 24 hours  losartan 50 milliGRAM(s) Oral daily  metoprolol tartrate 50 milliGRAM(s) Oral two times a day  montelukast 10 milliGRAM(s) Oral daily  sertraline 100 milliGRAM(s) Oral daily  simvastatin 20 milliGRAM(s) Oral at bedtime      Vital Signs Last 24 Hrs  T(C): 36.7 (22 Apr 2018 16:33), Max: 36.7 (22 Apr 2018 16:33)  T(F): 98 (22 Apr 2018 16:33), Max: 98 (22 Apr 2018 16:33)  HR: 63 (22 Apr 2018 16:33) (60 - 65)  BP: 144/60 (22 Apr 2018 16:33) (144/60 - 147/59)  BP(mean): --  RR: 18 (22 Apr 2018 10:13) (17 - 18)  SpO2: 100% (22 Apr 2018 16:33) (100% - 100%)    PHYSICAL EXAM:      Constitutional: Well developed well nourished     Musculoskeletal: Muscle atrophy bilateral LE' s noted     Psychiatric: Mood:  normal affect    Neuro:     Mental status:   The patient is alert, attentive, and oriented to self, place and situation   Speech is clear and fluent   Fund of knowledge, recent and remote memory are intact Good comprehension  Cranial nerves:  CN II: Visual fields are full to confrontation.   CN III, IV, and VI: At primary gaze, there is no eye deviation,   no ophthalmoplegia no Nystagmus No ptosis.   CN V: Facial sensation is intact to light touch   CN VII: Face is symmetric with normal eye closure and smile.  CN VII: Hearing is normal   CN IX, X: Palate elevates symmetrically. Phonation is normal.  CN XI: Head turning and shoulder shrug are intact  CN XII: Tongue is midline with normal movements and no atrophy.    Motor: good upper body strength   Normal Upper Extremity Muscle bulk and tone   Strength is full bilaterally UE's   Deltoids & Biceps, Triceps 5/5 bilaterally   Wrist extension / flex 5/5 bilaterally  Index to Thumb apposition 5/5 bilaterally,   Pinky to thumb apposition 5/5 bilaterally    + Proximal LE weakness   Iliopsoas, Hamstrings & Quads 1-2/5 bilaterally   Anterior Tibialis & Gastrocnemius 1-2/5 bilaterally  Left Dorsiflexion & Plantar Flex 4/5 bilaterally  Dorsiflexion & Plantar Flex 5/5 bilaterally  Sensory: intact to light touch  2 beats of ankle clonus noted Bilateral LE' s   Gait:  is not tested , Pt is wheelchair bound              Labs :              10.1   8.08  )-----------( 312      ( 22 Apr 2018 06:02 )             31.0    04-22    140  |  105  |  24<H>  ----------------------------<  150<H>  5.4<H>   |  30  |  0.82    Ca    9.0      22 Apr 2018 06:02      Imaging:   PROCEDURE DATE:  04/22/2018      INTERPRETATION:      CT thoracic spine without IV contrast        CLINICAL INFORMATION:  Acute on chronic  Backpain, spinal stenosis,   spondylosis.    TECHNIQUE:  Contiguous axial 2 mm sections were obtained through the   thoracic spine using a single helical acquisition.   Additional 2 mm   sagittal and coronal reconstructions of the spine were obtained. These   additional reformatted images were used to evaluate the spine for   alignment, vertebral fractures and the integrity of the the posterior   elements.   This scan was performed using automatic exposure control   (radiation dose reduction software) to obtain a diagnostic image quality   scan with patient dose as low as reasonably achievable.         FINDINGS:   No prior similar studies are available for review    Thoracic and lumbar vertebral body heights are maintained. No vertebral   fracture is seen. No destructive bone lesion is found.  Alignment is   preserved.  Facet joints appear intact and aligned.    Thoracic and lumbar intervertebral disc spaces demonstrate multilevel   degenerative disc disease and spondylosis involving the visualized lower   cervical, thoracic and lumbar spine. Posterior osteophytic ridge/disc   complexes at T6-7 and T7-8 flatten the ventral thecal sac.  Disc bulges   at L1-2 through L5-S1 flatten the ventral thecal sac and narrow the   BILATERAL neural foramina. Mild facet osteophytic mild-to-moderate facet   osteophytic hypertrophy is noted at L2-3 and L3-4 and L5-S1. Severe facet   osteophytic hypertrophy is noted at L4-5 which contributes to moderate   central stenosis at this level. Small central disc herniation noted at   L5-S1. MR would be required to evaluate the intervertebral discs at   higher sensitivity for disc pathology.    No paraspinal mass is recognized.  Paraspinal soft tissues appear intact.       IMPRESSION:  Unremarkable CT scanof the thoracic spine.   Multilevel   degenerative disc disease and spondylosis involving the visualized lower   cervical, thoracic and lumbar spine. Posterior osteophytic ridge/disc   complexes at T6-7 and T7-8 flatten the ventral thecal sac.  Disc bulges   at L1-2 through L5-S1 flatten the ventral thecal sac and narrow the   BILATERAL neural foramina. Mild facet osteophytic mild-to-moderate facet   osteophytic hypertrophy is noted at L2-3 and L3-4 and L5-S1. Severe facet   osteophytic hypertrophy is noted at L4-5 which contributes to moderate   central stenosis at this level. Small central disc herniation noted at   L5-S1.            QUINTON LINARES M.D., ATTENDING RADIOLOGIST  This document has been electronically signed. Apr 22 2018  1:35PM            Dx: Midback pain

## 2018-04-22 NOTE — CONSULT NOTE ADULT - ASSESSMENT
67 year old female with PMHx of HTN, DM, polio who presented to the ER with 2 week history of back pain in and around right shoulder blade not relieved with NSAIDs.  On tele in the ER found to have LBBB and episodes of bradycardia with second degree HB type II, rates 35-45 bpm.  Currently asymptomatic of this.  Upon further questioning she denies chest pain, reports occasional palpitations, lightheadedness and dizziness.  Denies syncope.    A/P:  Admit to tele   Cardio consult  2D echo to assess LV function.  Will attempt to get records from cardiologist.  Avoid AV elmer blockers  She will likely need a PPM  Case d/w Dr. Madrigal.
67 year old female with h/o Polio/HTN/DM p/w midback pain no radiculopathy . CT of the T/L spine consistent with degenerative changes . She is now unable to obtain an MRI due to recent PPM placement . Stable     1.	Multimodal pain regimen consider pain management   2.	Awaiting Cardiac Cath in am   3.	No Neurosurgical intervention recommended   4.	Case d/w Dr. Miller who concurs
Dizziness, Fatigue  Intermittent LBBB  Intermittent Type 2 second deg AV block  HTN  DM  HLD      Suggest:    PPM today  Avoid node slowing agents.  Echo  Hydration  Continue losartan for HTN  Statins  Dm control  Out pt ischemia evaluation after pacemaker.

## 2018-04-22 NOTE — PROGRESS NOTE ADULT - SUBJECTIVE AND OBJECTIVE BOX
Pt seen chart reviewed  CC: back pain    HPI: 67 year old Woman with pmhx of T2DM, HTN, polio (b/l LE atrophy) admitted with cc of (R) sided back pain around the shoulder blade past 2 weeks. Tried to alleviate her back pain with NSAIDs - without any resolution of pain. Patient states that, her back pain worse with motion / radiating occasionally towards her (R) upper extremity and deep breaths. While in the ED - EKG demonstrated QRS prolongation with LBBB. In addition, tele demonstrating episodes of dropped QRS complexes without prolongation of MN interval. Patient chest pain free. Denies any jaw pain, shoulder pain, diaphoresis or shortness of breath. Patient's risk factors include - DM /  HTN.   Pt was admitted and PPM was placed for second degree heart block type II.  She is POD 1 and doing well.  Hospital course complicated by episode of chest pain/heaviness last night.  She was evaluated by cardiology and is being medically optimized for CAD and angina.  She is to go for cardiac cath Monday.  At bedside she has chronic back pain but currently denying CP.    4/22: Pt still with back pain despite low dose opioids.  Denies CP, SOB.     REVIEW OF SYSTEMS: All other review of systems is negative unless indicated above.    Vital Signs Last 24 Hrs  T(C): 36.6 (22 Apr 2018 10:13), Max: 36.9 (21 Apr 2018 18:20)  T(F): 97.8 (22 Apr 2018 10:13), Max: 98.4 (21 Apr 2018 18:20)  HR: 60 (22 Apr 2018 10:13) (60 - 65)  BP: 147/59 (22 Apr 2018 10:13) (144/70 - 147/59)  BP(mean): --  RR: 18 (22 Apr 2018 10:13) (17 - 18)  SpO2: 100% (22 Apr 2018 10:13) (98% - 100%)    PHYSICAL EXAM:  HEENT:  pupils equal and reactive, EOMI, no oropharyngeal lesions, erythema, exudates, oral thrush  NECK:   supple, no carotid bruits, no palpable lymph nodes, no thyromegaly  CV:  +S1, +S2, regular, no murmurs or rubs; Left pectoral PPM insertion site intact with dermabond, no bruise/bleeding/hematoma  RESP:   lungs clear to auscultation bilaterally, no wheezing, rales, rhonchi, good air entry bilaterally  ISABELA:  abdomen obese, soft, non-tender, non-distended, normal BS, no bruits, no abdominal masses, no palpable masses  MSK/EXT: BLE flaccid, RLE externally rotated w/ hx of polio, BUE normal muscle tone, no atrophy, no rigidity, no contractions, no c/c/e  VASCULAR:  pulses equal and symmetric in the upper and lower extremities  NEURO:  AAOX3, no focal neurological deficits, follows all commands, able to move extremities spontaneously  SKIN:  no ulcers, lesions or rashes    MEDICATIONS  (STANDING):  aspirin  chewable 81 milliGRAM(s) Oral daily  insulin lispro (HumaLOG) corrective regimen sliding scale   SubCutaneous three times a day before meals  lidocaine   Patch 1 Patch Transdermal every 24 hours  losartan 50 milliGRAM(s) Oral daily  metoprolol tartrate 50 milliGRAM(s) Oral two times a day  montelukast 10 milliGRAM(s) Oral daily  sertraline 100 milliGRAM(s) Oral daily  simvastatin 20 milliGRAM(s) Oral at bedtime    MEDICATIONS  (PRN):  acetaminophen   Tablet. 650 milliGRAM(s) Oral every 6 hours PRN Mild Pain (1 - 3)  aluminum hydroxide/magnesium hydroxide/simethicone Suspension 30 milliLiter(s) Oral every 6 hours PRN Dyspepsia  nitroglycerin     SubLingual 0.4 milliGRAM(s) SubLingual every 5 minutes PRN Chest Pain  oxyCODONE    IR 5 milliGRAM(s) Oral every 4 hours PRN Moderate Pain (4 - 6)  oxyCODONE    IR 10 milliGRAM(s) Oral every 4 hours PRN Severe Pain (7 - 10)                              10.1   8.08  )-----------( 312      ( 22 Apr 2018 06:02 )             31.0     04-22    140  |  105  |  24<H>  ----------------------------<  150<H>  5.4<H>   |  30  |  0.82    Ca    9.0      22 Apr 2018 06:02      CAPILLARY BLOOD GLUCOSE      POCT Blood Glucose.: 148 mg/dL (22 Apr 2018 11:29)  POCT Blood Glucose.: 118 mg/dL (22 Apr 2018 08:08)  POCT Blood Glucose.: 128 mg/dL (21 Apr 2018 17:10)      Assessment and Plan:  67 year old Woman with pmhx of T2DM, HTN, polio (b/l LE atrophy) admitted with (R) sided back pain found to have LBBB and heart block.    		  LBBB with Mobitz type II HB  -s/p PPM POD 1  - CE's negative  - appreciate EP input   - con't ASA/ STATIN    Chest pain/Angina: Possible unstable  -monitor on tele  -started Beta blocker and titrate per cardio  -cardiac cath Monday    Back pain: acute on chronic back pain in setting of polio  -pain management  -CT thoracic and lumbar spine  -neurosurgical evaluation  -patient is wheelchair bound  -con't supportive care/pain management    HTN: controlled  -c/w losartan  -bb added    hyperkalemia:  -monitor    Anemia: Stable  -monitor    T2DM: controlled  - A1c 5.4 %  - con't low carb/ no concentrated sweets diet    VTE:  -SCDs for now    Attending Statement:  40 minutes spent on total encounter; more than 50% of the visit was spent counseling and/or coordinating care by the attending physician.

## 2018-04-22 NOTE — PROGRESS NOTE ADULT - SUBJECTIVE AND OBJECTIVE BOX
68 y/o/f with extensive pmhx of type II DM, HTN, polio (b/l LE atrophy) admitted with cc of (R) sided back pain around the shoulder blade past 2 weeks. Tried to alleviate her back pain with NSAIDs - without any resolution of pain. Patient states that, her back pain worse with motion / radiating occasionally towards her (R) upper extremity. Also worse with deep breaths. While in the ED - EKG demonstrated QRS prolongation with LBBB. In addition, tele demonstrating episodes of dropped QRS complexes without prolongation of UT interval. Patient chest pain free. Denies any jaw pain, shoulder pain, diaphoresis or shortness of breath. Patient's risk factors include - DM /  HTN. She has been fatigued. C/o generalized weakness. No syncope. Feels dizzy    Today, c/o chest pain and back pain. Tightness in the lower chest that lasted for 15 - 20 minutes.     PAST MEDICAL & SURGICAL HISTORY:  Polio  HTN (hypertension)  DM (diabetes mellitus)  No significant past surgical history    CARDIAC WORKUP:    Echo 4/19/18: Nml EF, 1+ MR, 1+ TR. Diastolic dysfunction  Stress Test:  2009 Normal    Allergies:   morphine (Unknown)  penicillin (Unknown)  sulfa drugs (Unknown)      MEDICATIONS  (STANDING):  aspirin  chewable 81 milliGRAM(s) Oral daily  insulin lispro (HumaLOG) corrective regimen sliding scale   SubCutaneous three times a day before meals  lidocaine   Patch 1 Patch Transdermal every 24 hours  losartan 50 milliGRAM(s) Oral daily  metoprolol tartrate 25 milliGRAM(s) Oral two times a day  montelukast 10 milliGRAM(s) Oral daily  sertraline 100 milliGRAM(s) Oral daily  simvastatin 20 milliGRAM(s) Oral at bedtime    MEDICATIONS  (PRN):  acetaminophen   Tablet. 650 milliGRAM(s) Oral every 6 hours PRN Mild Pain (1 - 3)  aluminum hydroxide/magnesium hydroxide/simethicone Suspension 30 milliLiter(s) Oral every 6 hours PRN Dyspepsia  nitroglycerin     SubLingual 0.4 milliGRAM(s) SubLingual every 5 minutes PRN Chest Pain  oxyCODONE    5 mG/acetaminophen 325 mG 1 Tablet(s) Oral every 6 hours PRN Severe Pain (7 - 10)      ROS:     Detailed ten system ROS was performed and was negative except for history as eluded to above.    no fever  no chills  no nausea  no vomiting  no diarrhea  no constipation  no melena  no hematochezia  + chest pain  no palpitations  no sob at rest  no dyspnea on exertion  no cough  no wheezing  no anorexia  no headache  no dizziness  no syncope  no weakness  no myalgia  no dysuria  no polyuria  no hematuria       Vital Signs Last 24 Hrs  T(C): 36.6 (22 Apr 2018 10:13), Max: 36.9 (21 Apr 2018 18:20)  T(F): 97.8 (22 Apr 2018 10:13), Max: 98.4 (21 Apr 2018 18:20)  HR: 60 (22 Apr 2018 10:13) (60 - 65)  BP: 147/59 (22 Apr 2018 10:13) (144/70 - 147/59)  BP(mean): --  RR: 18 (22 Apr 2018 10:13) (17 - 18)  SpO2: 100% (22 Apr 2018 10:13) (98% - 100%)    I&O's Summary      PHYSICAL EXAM:    General:                Comfortable, AAO X 3, in no distress.  HEENT:                  Atraumatic, PERRLA, EOMI, conjunctiva clear.    Neck:                     Supple, no adenopathy, no thyromegaly, no JVD, no bruit.  Back:                     Symmetric, non tender.  Chest:                    Clear, B/L symmetric air entry, no tachypnea  Heart:                     S1, S2 normal, no gallop, + murmur.  Abdomen:              Soft, non-tender, bowel sounds active. No palpable masses.  Extremities:           no cyanosis, no edema. Peripheral pulses normal.  Skin:                      Skin color, texture normal. No rashes.  Neurologic:            Grossly nonfocal.       INTERPRETATION OF TELEMETRY:  Sinus, LBBB, intermittent V paced    LABS:                        10.1   8.08  )-----------( 312      ( 22 Apr 2018 06:02 )             31.0     04-22    140  |  105  |  24<H>  ----------------------------<  150<H>  5.4<H>   |  30  |  0.82    Ca    9.0      22 Apr 2018 06:02        Lipid Panel  Chl 145  HDL 60  LDL 73  Trg 61      D Dimer  525 04-18 @ 21:54      RADIOLOGY & ADDITIONAL STUDIES:    Xray Chest 1 View- PORTABLE-Urgent (04.20.18 @ 13:12) > IMPRESSION: Status post left chest wall dual-lead pacemaker placement. No pneumothorax.

## 2018-04-22 NOTE — PROGRESS NOTE ADULT - ASSESSMENT
Chest pain. Dyspnea. Unstable angina  s/p PPM  Anemia - acute drop in H/H  HTN  DM  HLD      Suggest:    Ischemia evaluation - Based on pt's symptoms, history, risk factors, exam, lab and EKG data I have advised pt have a cardiac catheterization and possible percutaneous coronary intervention. Procedure, its risks, alternatives, benefits and potential complications were discussed in detail. Risks include but not limited to bleeding, infection, allergy, renal failure requiring dialysis, stroke, vascular injury, pericardial tamponade, arrhythmias, MI and even death. Pt is agreeable and has consented for the procedure and the procedure is being scheduled for Monday.   Increase beta blockers diose  Check EKG today  Check CE  Continue losartan for HTN  Statins  DM control  H/H stable.

## 2018-04-23 ENCOUNTER — TRANSCRIPTION ENCOUNTER (OUTPATIENT)
Age: 68
End: 2018-04-23

## 2018-04-23 VITALS
TEMPERATURE: 98 F | HEART RATE: 65 BPM | SYSTOLIC BLOOD PRESSURE: 123 MMHG | RESPIRATION RATE: 18 BRPM | DIASTOLIC BLOOD PRESSURE: 62 MMHG | OXYGEN SATURATION: 99 %

## 2018-04-23 LAB
ANION GAP SERPL CALC-SCNC: 5 MMOL/L — SIGNIFICANT CHANGE UP (ref 5–17)
ANION GAP SERPL CALC-SCNC: 5 MMOL/L — SIGNIFICANT CHANGE UP (ref 5–17)
BUN SERPL-MCNC: 24 MG/DL — HIGH (ref 7–23)
BUN SERPL-MCNC: 24 MG/DL — HIGH (ref 7–23)
CALCIUM SERPL-MCNC: 9 MG/DL — SIGNIFICANT CHANGE UP (ref 8.5–10.1)
CALCIUM SERPL-MCNC: 9.1 MG/DL — SIGNIFICANT CHANGE UP (ref 8.5–10.1)
CHLORIDE SERPL-SCNC: 107 MMOL/L — SIGNIFICANT CHANGE UP (ref 96–108)
CHLORIDE SERPL-SCNC: 107 MMOL/L — SIGNIFICANT CHANGE UP (ref 96–108)
CO2 SERPL-SCNC: 28 MMOL/L — SIGNIFICANT CHANGE UP (ref 22–31)
CO2 SERPL-SCNC: 29 MMOL/L — SIGNIFICANT CHANGE UP (ref 22–31)
CREAT SERPL-MCNC: 0.79 MG/DL — SIGNIFICANT CHANGE UP (ref 0.5–1.3)
CREAT SERPL-MCNC: 0.86 MG/DL — SIGNIFICANT CHANGE UP (ref 0.5–1.3)
GLUCOSE BLDC GLUCOMTR-MCNC: 115 MG/DL — HIGH (ref 70–99)
GLUCOSE BLDC GLUCOMTR-MCNC: 164 MG/DL — HIGH (ref 70–99)
GLUCOSE SERPL-MCNC: 101 MG/DL — HIGH (ref 70–99)
GLUCOSE SERPL-MCNC: 96 MG/DL — SIGNIFICANT CHANGE UP (ref 70–99)
HCT VFR BLD CALC: 30.3 % — LOW (ref 34.5–45)
HGB BLD-MCNC: 9.9 G/DL — LOW (ref 11.5–15.5)
MAGNESIUM SERPL-MCNC: 1.7 MG/DL — SIGNIFICANT CHANGE UP (ref 1.6–2.6)
MCHC RBC-ENTMCNC: 29.9 PG — SIGNIFICANT CHANGE UP (ref 27–34)
MCHC RBC-ENTMCNC: 32.7 GM/DL — SIGNIFICANT CHANGE UP (ref 32–36)
MCV RBC AUTO: 91.5 FL — SIGNIFICANT CHANGE UP (ref 80–100)
NRBC # BLD: 0 /100 WBCS — SIGNIFICANT CHANGE UP (ref 0–0)
PHOSPHATE SERPL-MCNC: 3.5 MG/DL — SIGNIFICANT CHANGE UP (ref 2.5–4.5)
PLATELET # BLD AUTO: 310 K/UL — SIGNIFICANT CHANGE UP (ref 150–400)
POTASSIUM SERPL-MCNC: 4.6 MMOL/L — SIGNIFICANT CHANGE UP (ref 3.5–5.3)
POTASSIUM SERPL-MCNC: 5.5 MMOL/L — HIGH (ref 3.5–5.3)
POTASSIUM SERPL-SCNC: 4.6 MMOL/L — SIGNIFICANT CHANGE UP (ref 3.5–5.3)
POTASSIUM SERPL-SCNC: 5.5 MMOL/L — HIGH (ref 3.5–5.3)
RBC # BLD: 3.31 M/UL — LOW (ref 3.8–5.2)
RBC # FLD: 15 % — HIGH (ref 10.3–14.5)
SODIUM SERPL-SCNC: 140 MMOL/L — SIGNIFICANT CHANGE UP (ref 135–145)
SODIUM SERPL-SCNC: 141 MMOL/L — SIGNIFICANT CHANGE UP (ref 135–145)
TROPONIN I SERPL-MCNC: 0.02 NG/ML — SIGNIFICANT CHANGE UP (ref 0.01–0.04)
TROPONIN I SERPL-MCNC: 0.02 NG/ML — SIGNIFICANT CHANGE UP (ref 0.01–0.04)
WBC # BLD: 9.05 K/UL — SIGNIFICANT CHANGE UP (ref 3.8–10.5)
WBC # FLD AUTO: 9.05 K/UL — SIGNIFICANT CHANGE UP (ref 3.8–10.5)

## 2018-04-23 PROCEDURE — 93010 ELECTROCARDIOGRAM REPORT: CPT

## 2018-04-23 RX ORDER — METFORMIN HYDROCHLORIDE 850 MG/1
2 TABLET ORAL
Qty: 0 | Refills: 0 | COMMUNITY

## 2018-04-23 RX ORDER — ACETAMINOPHEN 500 MG
2 TABLET ORAL
Qty: 180 | Refills: 0
Start: 2018-04-23 | End: 2018-05-22

## 2018-04-23 RX ORDER — ASPIRIN/CALCIUM CARB/MAGNESIUM 324 MG
1 TABLET ORAL
Qty: 30 | Refills: 0
Start: 2018-04-23 | End: 2018-05-22

## 2018-04-23 RX ORDER — METOPROLOL TARTRATE 50 MG
1 TABLET ORAL
Qty: 60 | Refills: 0
Start: 2018-04-23 | End: 2018-05-22

## 2018-04-23 RX ORDER — METHOCARBAMOL 500 MG/1
750 TABLET, FILM COATED ORAL THREE TIMES A DAY
Qty: 0 | Refills: 0 | Status: DISCONTINUED | OUTPATIENT
Start: 2018-04-23 | End: 2018-04-23

## 2018-04-23 RX ADMIN — METHOCARBAMOL 750 MILLIGRAM(S): 500 TABLET, FILM COATED ORAL at 12:39

## 2018-04-23 RX ADMIN — Medication 1: at 12:38

## 2018-04-23 RX ADMIN — Medication 81 MILLIGRAM(S): at 09:14

## 2018-04-23 RX ADMIN — LOSARTAN POTASSIUM 50 MILLIGRAM(S): 100 TABLET, FILM COATED ORAL at 05:30

## 2018-04-23 RX ADMIN — METHOCARBAMOL 750 MILLIGRAM(S): 500 TABLET, FILM COATED ORAL at 05:31

## 2018-04-23 RX ADMIN — Medication 50 MILLIGRAM(S): at 05:30

## 2018-04-23 RX ADMIN — LIDOCAINE 1 PATCH: 4 CREAM TOPICAL at 12:40

## 2018-04-23 RX ADMIN — SERTRALINE 100 MILLIGRAM(S): 25 TABLET, FILM COATED ORAL at 12:38

## 2018-04-23 RX ADMIN — MONTELUKAST 10 MILLIGRAM(S): 4 TABLET, CHEWABLE ORAL at 12:38

## 2018-04-23 RX ADMIN — METHOCARBAMOL 750 MILLIGRAM(S): 500 TABLET, FILM COATED ORAL at 00:27

## 2018-04-23 RX ADMIN — Medication 0.4 MILLIGRAM(S): at 00:53

## 2018-04-23 NOTE — DISCHARGE NOTE ADULT - CARE PROVIDERS DIRECT ADDRESSES
,DirectAddress_Unknown,DirectAddress_Unknown,[email or web address]

## 2018-04-23 NOTE — DISCHARGE NOTE ADULT - MEDICATION SUMMARY - MEDICATIONS TO STOP TAKING
I will STOP taking the medications listed below when I get home from the hospital:    metFORMIN 500 mg oral tablet

## 2018-04-23 NOTE — DISCHARGE NOTE ADULT - HOSPITAL COURSE
67 year old Woman with pmhx of T2DM, HTN, polio (b/l LE atrophy) admitted with cc of (R) sided back pain around the shoulder blade past 2 weeks. Tried to alleviate her back pain with NSAIDs - without any resolution of pain. Patient states that, her back pain worse with motion / radiating occasionally towards her (R) upper extremity and deep breaths. While in the ED - EKG demonstrated QRS prolongation with LBBB. In addition, tele demonstrating episodes of dropped QRS complexes without prolongation of OH interval.   She was admitted and underwent PPM placement for second degree heart block type II.  Hospital course complicated by episode of chest pain/heaviness for which she underwent cardiac cath which revealed non-obstruct CAD.     Today she remains chest pain free, underwent neurosurgery eval for chronic back pain r/t being w/c bound d/t polio. Moves well in bed, sits at edge of bed herself. She remains stable for discharge home. Pt and grandson educated on the post discharge meds, follow up and procedure care. She is to make an freda't to f/u with Dr Paige within 1 week. Pt and grandson verbalized an understanding of and agreed with discharge plans.        PHYSICAL EXAM:  HEENT:  pupils equal and reactive, EOMI, no oropharyngeal lesions, erythema, exudates, oral thrush  NECK:   supple, no carotid bruits, no palpable lymph nodes, no thyromegaly  CV:  +S1, +S2, regular, no murmurs or rubs; Left pectoral PPM insertion site intact with dermabond, no bruise/bleeding/hematoma  RESP:   lungs clear to auscultation bilaterally, no wheezing, rales, rhonchi, good air entry bilaterally  ISABELA:  abdomen obese, soft, non-tender, non-distended, normal BS, no bruits, no abdominal masses, no palpable masses  MSK/EXT: BLE flaccid, RLE externally rotated w/ hx of polio, BUE normal muscle tone, no atrophy, no rigidity, no contractions, no c/c/e  VASCULAR:  pulses equal and symmetric in the upper and lower extremities  NEURO:  AAOX3, no focal neurological deficits, follows all commands, able to move extremities spontaneously  SKIN:  no ulcers, lesions or rashes    ASSESSMENT / PLAN  67 year old Woman with pmhx of T2DM, HTN, polio (b/l LE atrophy) admitted with (R) sided back pain found to have LBBB and heart block admitted and treated for:    LBBB with Mobitz type II HB  -s/p PPM 4/20  - con't ASA/ STATIN  - Follow with cardiology after discharge    Chest pain/Angina:   - started Beta blocker admission - tolerating well  - s/p cardiac cath--> non-obstruct CAD    Back pain: acute on chronic back pain in setting of polio  - CT spine reviewed-->diffuse degenerative changes throughout the cervical, thoracic and lumbar spine  -  moves well in bed  -pain management - tylenol at discharge  -neurosurgical evaluation appreciated  -con't supportive care/pain management  - outpt follow up with her PCP    HTN: controlled  -c/w losartan / lopressor    Anemia: Stable  - monitor    T2DM: controlled  - A1c 5.4 %  - con't low carb/ no concentrated sweets diet  - will dc home dose metformin, con't glypizide    Dispo  - discharge to home with DME 67 year old Woman with pmhx of T2DM, HTN, polio (b/l LE atrophy) admitted with cc of (R) sided back pain around the shoulder blade past 2 weeks. Tried to alleviate her back pain with NSAIDs - without any resolution of pain. Patient states that, her back pain worse with motion / radiating occasionally towards her (R) upper extremity and deep breaths. While in the ED - EKG demonstrated QRS prolongation with LBBB. In addition, tele demonstrating episodes of dropped QRS complexes without prolongation of OK interval.   She was admitted and underwent PPM placement for second degree heart block type II.  Hospital course complicated by episode of chest pain/heaviness for which she underwent cardiac cath which revealed non-obstruct CAD.     Today she remains chest pain free, underwent neurosurgery eval for chronic back pain r/t being w/c bound d/t polio. Moves well in bed, sits at edge of bed herself. She remains stable for discharge home. Pt and grandson educated on the post discharge meds, follow up and procedure care. She is to make an freda't to f/u with Dr Paige within 1 week. Pt and grandson verbalized an understanding of and agreed with discharge plans.    REVIEW OF SYSTEMS: All other review of systems is negative unless indicated above.    PHYSICAL EXAM:  HEENT:  pupils equal and reactive, EOMI, no oropharyngeal lesions, erythema, exudates, oral thrush  NECK:   supple, no carotid bruits, no palpable lymph nodes, no thyromegaly  CV:  +S1, +S2, regular, no murmurs or rubs; Left pectoral PPM insertion site intact with dermabond, no bruise/bleeding/hematoma  RESP:   lungs clear to auscultation bilaterally, no wheezing, rales, rhonchi, good air entry bilaterally  ISABELA:  abdomen obese, soft, non-tender, non-distended, normal BS, no bruits, no abdominal masses, no palpable masses  MSK/EXT: BLE flaccid, RLE externally rotated w/ hx of polio, BUE normal muscle tone, no atrophy, no rigidity, no contractions, no c/c/e  VASCULAR:  pulses equal and symmetric in the upper and lower extremities  NEURO:  AAOX3, no focal neurological deficits, follows all commands, able to move extremities spontaneously  SKIN:  no ulcers, lesions or rashes    meds/labs: Reviewed    ASSESSMENT / PLAN  67 year old Woman with pmhx of T2DM, HTN, polio (b/l LE atrophy) admitted with (R) sided back pain found to have LBBB and heart block admitted and treated for:    LBBB with Mobitz type II HB  -s/p PPM 4/20  - con't ASA/ STATIN  - Follow with cardiology after discharge    Chest pain/Angina:   - started Beta blocker admission - tolerating well  - s/p cardiac cath--> non-obstruct CAD    Back pain: acute on chronic back pain in setting of polio  - CT spine reviewed-->diffuse degenerative changes throughout the cervical, thoracic and lumbar spine  -  moves well in bed  -pain management - tylenol at discharge  -neurosurgical evaluation appreciated  -con't supportive care/pain management  - outpt follow up with her PCP    HTN: controlled  -c/w losartan / lopressor    Anemia: Stable  - monitor    T2DM: controlled  - A1c 5.4 %  - con't low carb/ no concentrated sweets diet  - will dc home dose metformin, con't glypizide    Dispo  - discharge to home with DME    Attending Statement:  40 minutes spent on total encounter; more than 50% of the visit was spent counseling and/or coordinating care by the attending physician.  Patient seen and examined with ANITA Covington.  Agree with physical exam and assessment and plan.

## 2018-04-23 NOTE — PROGRESS NOTE ADULT - SUBJECTIVE AND OBJECTIVE BOX
68 yo female admitted with right shoulder pain; occasional radiation into right arm. CT spine demonstrates diffuse degenerative changes throughout the cervical, thoracic and lumbar spine.  Patient has long standing difficulties with ambulating secondary to childhood polio. No reported change in neurologic exam. Also complains of numbness of hands , right greater than left.  Past History: Diabetes, Pacemaker, CAHD.  EXAM: alert and oriented, moves all extremities.  4/5 weakness of both lower extremities consistent with baseline performance according to patient..  Cardiac workup including cardiac catheterization today reported negative.  Unable to have MRI because of cardiac pacemaker.  IMPRESSION: Right shoulder pain.  SUGGEST outpatient work up with neurology, physical therapy and pain management.

## 2018-04-23 NOTE — DISCHARGE NOTE ADULT - NS AS ACTIVITY OBS
No vigorous activity with left arm; no raising of left arm above the head for 2 months or until your follow up with the electrophysiologist or your regular cardiologist. Do not apply lotion, cream to the pacemaker insertion site. No soaking of pacemaker insertion site in tub or swimming pool.

## 2018-04-23 NOTE — CHART NOTE - NSCHARTNOTEFT_GEN_A_CORE
RN to MD call at 1:20am pt c/o of chest pain    Pt was seen and examined at bedside immediately and is resting comfortably. Pt at bedside denies any HA, SOB, Chest Pain, Palpitations, N/V, abdominal pain. In addition, pt denies any left arm pain, neck pain, jaw pain. Pt has a chronic history of back pain, however, denies the chest pain is radiating to her back. Pt has no further complaints.    Vitals: BP: 111/62, HR 68, Temp: 98.5, Oxygen 100% room air, RR 17  General: NAD, resting comfortably in bed  Lungs: CTA b/l no wheezing, no crackles, no rales or ronchi  CVS: Normal S1 and S2, RRR  Neuro: AAOx3    67F c/o of left sided chest pain - seen at bedside s/p 1 dose of sublingual nitroglycerin  -  continue aspirin 81mg, lopressor 50mg, losartan 50mg, simvastatin 20mg,   - STAT EKG: showed LBBB HR of 65 NSR - no change from previous EKG   -  f/u troponin level, bmp and mag and phos  - does not meet sgarbosa criteria  - pt is scheduled for angiogram in am  - NPO        case discussed with Dr. Mann and Dr. Guevara

## 2018-04-23 NOTE — PACU DISCHARGE NOTE - COMMENTS
report to Jeaneth prater on 3e/pt transported via remote monitor/pt verbalizes  understanding or limitations in activtiy

## 2018-04-23 NOTE — CHART NOTE - NSCHARTNOTEFT_GEN_A_CORE
s/p LHC revealing non obstructive CAD    Patient feels well.  Denies chest pain, shortness of breath, dizziness or palpitations at this time    Right groin procedure site CDI.  no bleeding, no hematoma, site soft, non tender, positive pedal pulses bilaterally      HPI:  Pt is a 66 y/o/f with extensive pmhx of type II DM, HTN, polio (b/l LE atrophy) admitted with cc of (R) sided back pain around the shoulder blade past 2 weeks. Tried to alleviate her back pain with NSAIDs - without any resolution of pain. Patient states that, her back pain worse with motion / radiating occasionally towards her (R) upper extremity and deep breaths. While in the ED - EKG demonstrated QRS prolongation with LBBB. In addition, tele demonstrating episodes of dropped QRS complexes without prolongation of CT interval. Patient chest pain free. Denies any jaw pain, shoulder pain, diaphoresis or shortness of breath. Patient's risk factors include - DM /  HTN.     S/P LHC:   Non obstructive coronary artery disease.   Normal LV systolicfunction. Estimated LV ejection fraction is 55 %.   No aortic valve stenosis.    Plan  -vital signs, diet and activity per post procedure orders  -ambulate ad rogelio post bedrest  -encourage PO fluids  -plan of care discussed with patient and MD   -post procedure instructions reviewed  -Discussed therapeutic lifestyle changes to reduce risk factors such as following a cardiac diet, weight loss, maintaining a healthy weight, exercise, smoking cessation, medication compliance, and regular follow-up  with MD

## 2018-04-23 NOTE — DISCHARGE NOTE ADULT - MEDICATION SUMMARY - MEDICATIONS TO TAKE
I will START or STAY ON the medications listed below when I get home from the hospital:    aspirin 81 mg oral tablet, chewable  -- 1 tab(s) by mouth once a day  -- Indication: For Abnormal EKG    acetaminophen 325 mg oral capsule  -- 2 cap(s) by mouth every 8 hours, As Needed for back pain  -- Indication: For Back pain    losartan 50 mg oral tablet  -- 1 tab(s) by mouth once a day  -- Indication: For HTN (hypertension)    Zoloft 100 mg oral tablet  -- 1 tab(s) by mouth once a day  -- Indication: For Depression    glimepiride 2 mg oral tablet  -- 1 tab(s) by mouth 2 times a day  -- Indication: For DM (diabetes mellitus)    simvastatin 20 mg oral tablet  -- 1 tab(s) by mouth once a day (at bedtime)  -- Indication: For cholesterol, non- obstruct CAD    metoprolol tartrate 50 mg oral tablet  -- 1 tab(s) by mouth 2 times a day  -- Indication: For HTN (hypertension) / non-obstruct CAD    montelukast 10 mg oral tablet  -- 1 tab(s) by mouth once a day  -- Indication: For HTN (hypertension)

## 2018-04-23 NOTE — DISCHARGE NOTE ADULT - CARE PROVIDER_API CALL
Jaylene Boss), Family Medicine  [address] [place] [address]  Phone: [phone]  Fax: [phone]    Kristal Paige), Cardiology; Interventional Cardiology  [address] [facility] [place] [address]  Phone: [phone]  Fax: [phone]    Tommie Lopez), Cardiovascular Disease; Internal Medicine  [address] [place] [address]  Phone: [phone]  Fax: [phone]

## 2018-04-23 NOTE — DISCHARGE NOTE ADULT - PLAN OF CARE
stable cardiac function follow up with cardiology /Dr lawrence within 1 week  take your medications as prescribed optimal functioning return to your activities of daily living as tolerated  ongoing supportive care and interactions with family Optimal cardiac regulations take your medications as prescribed  No vigorous activity with left arm; no raising of left arm above the head for 2 months or until your follow up with the electrophysiologist or your regular cardiologist. Do not apply lotion, cream to the pacemaker insertion site. No soaking of pacemaker insertion site in tub or swimming pool.     Follow up with electrophysiologist for re-evaluation of pacemaker site

## 2018-04-23 NOTE — DISCHARGE NOTE ADULT - CARE PLAN
Principal Discharge DX:	Heart block, AV  Goal:	stable cardiac function  Assessment and plan of treatment:	follow up with cardiology /Dr lawrence within 1 week  take your medications as prescribed  Secondary Diagnosis:	Polio  Goal:	optimal functioning  Assessment and plan of treatment:	return to your activities of daily living as tolerated  ongoing supportive care and interactions with family  Secondary Diagnosis:	Pacemaker  Goal:	Optimal cardiac regulations  Assessment and plan of treatment:	take your medications as prescribed  No vigorous activity with left arm; no raising of left arm above the head for 2 months or until your follow up with the electrophysiologist or your regular cardiologist. Do not apply lotion, cream to the pacemaker insertion site. No soaking of pacemaker insertion site in tub or swimming pool.     Follow up with electrophysiologist for re-evaluation of pacemaker site

## 2018-05-04 ENCOUNTER — APPOINTMENT (OUTPATIENT)
Dept: ELECTROPHYSIOLOGY | Facility: CLINIC | Age: 68
End: 2018-05-04
Payer: MEDICARE

## 2018-05-04 VITALS — SYSTOLIC BLOOD PRESSURE: 120 MMHG | DIASTOLIC BLOOD PRESSURE: 71 MMHG | HEIGHT: 62 IN | HEART RATE: 60 BPM

## 2018-05-04 PROBLEM — I10 ESSENTIAL (PRIMARY) HYPERTENSION: Chronic | Status: ACTIVE | Noted: 2018-04-19

## 2018-05-04 PROBLEM — A80.9 ACUTE POLIOMYELITIS, UNSPECIFIED: Chronic | Status: ACTIVE | Noted: 2018-04-19

## 2018-05-04 PROBLEM — E11.9 TYPE 2 DIABETES MELLITUS WITHOUT COMPLICATIONS: Chronic | Status: ACTIVE | Noted: 2018-04-19

## 2018-05-04 PROCEDURE — 99024 POSTOP FOLLOW-UP VISIT: CPT

## 2019-01-22 ENCOUNTER — APPOINTMENT (OUTPATIENT)
Dept: ELECTROPHYSIOLOGY | Facility: CLINIC | Age: 69
End: 2019-01-22
Payer: MEDICARE

## 2019-01-22 PROCEDURE — 93296 REM INTERROG EVL PM/IDS: CPT

## 2019-01-22 PROCEDURE — 93294 REM INTERROG EVL PM/LDLS PM: CPT

## 2019-03-30 ENCOUNTER — EMERGENCY (EMERGENCY)
Facility: HOSPITAL | Age: 69
LOS: 0 days | Discharge: HOME | End: 2019-03-30
Attending: EMERGENCY MEDICINE | Admitting: EMERGENCY MEDICINE
Payer: MEDICARE

## 2019-03-30 VITALS
DIASTOLIC BLOOD PRESSURE: 60 MMHG | SYSTOLIC BLOOD PRESSURE: 110 MMHG | RESPIRATION RATE: 18 BRPM | OXYGEN SATURATION: 100 % | HEART RATE: 79 BPM

## 2019-03-30 VITALS — HEIGHT: 62 IN | WEIGHT: 149.91 LBS

## 2019-03-30 DIAGNOSIS — E87.5 HYPERKALEMIA: ICD-10-CM

## 2019-03-30 DIAGNOSIS — Z88.5 ALLERGY STATUS TO NARCOTIC AGENT: ICD-10-CM

## 2019-03-30 DIAGNOSIS — Z79.84 LONG TERM (CURRENT) USE OF ORAL HYPOGLYCEMIC DRUGS: ICD-10-CM

## 2019-03-30 DIAGNOSIS — R10.9 UNSPECIFIED ABDOMINAL PAIN: ICD-10-CM

## 2019-03-30 DIAGNOSIS — Z88.2 ALLERGY STATUS TO SULFONAMIDES: ICD-10-CM

## 2019-03-30 DIAGNOSIS — Z88.0 ALLERGY STATUS TO PENICILLIN: ICD-10-CM

## 2019-03-30 DIAGNOSIS — Z95.0 PRESENCE OF CARDIAC PACEMAKER: ICD-10-CM

## 2019-03-30 DIAGNOSIS — R19.7 DIARRHEA, UNSPECIFIED: ICD-10-CM

## 2019-03-30 DIAGNOSIS — Z79.82 LONG TERM (CURRENT) USE OF ASPIRIN: ICD-10-CM

## 2019-03-30 DIAGNOSIS — E11.9 TYPE 2 DIABETES MELLITUS WITHOUT COMPLICATIONS: ICD-10-CM

## 2019-03-30 DIAGNOSIS — I10 ESSENTIAL (PRIMARY) HYPERTENSION: ICD-10-CM

## 2019-03-30 LAB
ALBUMIN SERPL ELPH-MCNC: 3.7 G/DL — SIGNIFICANT CHANGE UP (ref 3.3–5)
ALP SERPL-CCNC: 88 U/L — SIGNIFICANT CHANGE UP (ref 40–120)
ALT FLD-CCNC: 11 U/L — LOW (ref 12–78)
ANION GAP SERPL CALC-SCNC: 10 MMOL/L — SIGNIFICANT CHANGE UP (ref 5–17)
AST SERPL-CCNC: 11 U/L — LOW (ref 15–37)
BASOPHILS # BLD AUTO: 0.04 K/UL — SIGNIFICANT CHANGE UP (ref 0–0.2)
BASOPHILS NFR BLD AUTO: 0.5 % — SIGNIFICANT CHANGE UP (ref 0–2)
BILIRUB SERPL-MCNC: 0.2 MG/DL — SIGNIFICANT CHANGE UP (ref 0.2–1.2)
BUN SERPL-MCNC: 24 MG/DL — HIGH (ref 7–23)
CALCIUM SERPL-MCNC: 8.7 MG/DL — SIGNIFICANT CHANGE UP (ref 8.5–10.1)
CHLORIDE SERPL-SCNC: 109 MMOL/L — HIGH (ref 96–108)
CO2 SERPL-SCNC: 19 MMOL/L — LOW (ref 22–31)
CREAT SERPL-MCNC: 0.79 MG/DL — SIGNIFICANT CHANGE UP (ref 0.5–1.3)
EOSINOPHIL # BLD AUTO: 0.29 K/UL — SIGNIFICANT CHANGE UP (ref 0–0.5)
EOSINOPHIL NFR BLD AUTO: 3.6 % — SIGNIFICANT CHANGE UP (ref 0–6)
GLUCOSE SERPL-MCNC: 106 MG/DL — HIGH (ref 70–99)
HCT VFR BLD CALC: 37.9 % — SIGNIFICANT CHANGE UP (ref 34.5–45)
HGB BLD-MCNC: 12.1 G/DL — SIGNIFICANT CHANGE UP (ref 11.5–15.5)
IMM GRANULOCYTES NFR BLD AUTO: 0.2 % — SIGNIFICANT CHANGE UP (ref 0–1.5)
LYMPHOCYTES # BLD AUTO: 3.34 K/UL — HIGH (ref 1–3.3)
LYMPHOCYTES # BLD AUTO: 41 % — SIGNIFICANT CHANGE UP (ref 13–44)
MAGNESIUM SERPL-MCNC: 1.8 MG/DL — SIGNIFICANT CHANGE UP (ref 1.6–2.6)
MCHC RBC-ENTMCNC: 31 PG — SIGNIFICANT CHANGE UP (ref 27–34)
MCHC RBC-ENTMCNC: 31.9 GM/DL — LOW (ref 32–36)
MCV RBC AUTO: 97.2 FL — SIGNIFICANT CHANGE UP (ref 80–100)
MONOCYTES # BLD AUTO: 0.63 K/UL — SIGNIFICANT CHANGE UP (ref 0–0.9)
MONOCYTES NFR BLD AUTO: 7.7 % — SIGNIFICANT CHANGE UP (ref 2–14)
NEUTROPHILS # BLD AUTO: 3.83 K/UL — SIGNIFICANT CHANGE UP (ref 1.8–7.4)
NEUTROPHILS NFR BLD AUTO: 47 % — SIGNIFICANT CHANGE UP (ref 43–77)
NRBC # BLD: 0 /100 WBCS — SIGNIFICANT CHANGE UP (ref 0–0)
PHOSPHATE SERPL-MCNC: 2.4 MG/DL — LOW (ref 2.5–4.5)
PLATELET # BLD AUTO: 280 K/UL — SIGNIFICANT CHANGE UP (ref 150–400)
POTASSIUM SERPL-MCNC: 4.1 MMOL/L — SIGNIFICANT CHANGE UP (ref 3.5–5.3)
POTASSIUM SERPL-SCNC: 4.1 MMOL/L — SIGNIFICANT CHANGE UP (ref 3.5–5.3)
PROT SERPL-MCNC: 7.7 GM/DL — SIGNIFICANT CHANGE UP (ref 6–8.3)
RBC # BLD: 3.9 M/UL — SIGNIFICANT CHANGE UP (ref 3.8–5.2)
RBC # FLD: 14.9 % — HIGH (ref 10.3–14.5)
SODIUM SERPL-SCNC: 138 MMOL/L — SIGNIFICANT CHANGE UP (ref 135–145)
WBC # BLD: 8.15 K/UL — SIGNIFICANT CHANGE UP (ref 3.8–10.5)
WBC # FLD AUTO: 8.15 K/UL — SIGNIFICANT CHANGE UP (ref 3.8–10.5)

## 2019-03-30 PROCEDURE — 93010 ELECTROCARDIOGRAM REPORT: CPT

## 2019-03-30 PROCEDURE — 74176 CT ABD & PELVIS W/O CONTRAST: CPT | Mod: 26

## 2019-03-30 PROCEDURE — 99284 EMERGENCY DEPT VISIT MOD MDM: CPT | Mod: 25

## 2019-03-30 NOTE — ED PROVIDER NOTE - OBJECTIVE STATEMENT
67 y/o female with a PMHx of DM, HTN, pacemaker, h/o cholecystectomy presents to the ED regarding abnormal lab results. Pt received a call from her PCP and was told to come to ED for her elevated potassium. +diarrhea, +abd pain. Denies fever. No sick contacts. Allergies: Morphine, Penicillin, Sulfa drugs. PCP: Dr. Turcios. Cardiologist: Dr. Paige. No other complaints at this time. 69 y/o female with a PMHx of DM, HTN, pacemaker, h/o cholecystectomy presents to the ED regarding abnormal lab results. Pt received a call from her PCP and was told to come to ED for her elevated potassium. +diarrhea, +abd pain. Denies fever. No sick contacts. Allergies: Morphine, Penicillin, Sulfa drugs. PCP: Dr. Arboleda. Cardiologist: Dr. Paige. No other complaints at this time.

## 2019-03-30 NOTE — ED PROVIDER NOTE - PROGRESS NOTE DETAILS
no acute findings on labs or ct. outpt mammo and renal sono  needed. pt aware of finding on kidney, states she's had it before. has apptmt for mammo. results given to pt. daughters at [facility] when reviewd test results. dc home. MD SIMON

## 2019-03-30 NOTE — ED STATDOCS - PROGRESS NOTE DETAILS
Romero CRUZ for ED attending, Dr. Bradley.  67y/o F w/ PMHx of HTN, DM, Polio presents to the ED per Pt states she has palpitations.  Denies CP, SOB, N/V/D.  Pt states this is the first time she's had elevated potassium.  Denies renal dysfunction or change in bowel and bladder function.  Referred to ED b/c she cannot get Kayexalate until after the weekend.  Pt on 81mg ASA daily.  Allergy to Penicillin, morphine and sulfa drugs. PMD: Dr. Osborn

## 2019-05-21 ENCOUNTER — APPOINTMENT (OUTPATIENT)
Dept: ELECTROPHYSIOLOGY | Facility: CLINIC | Age: 69
End: 2019-05-21
Payer: MEDICARE

## 2019-05-21 VITALS — HEIGHT: 62 IN | DIASTOLIC BLOOD PRESSURE: 61 MMHG | HEART RATE: 68 BPM | SYSTOLIC BLOOD PRESSURE: 124 MMHG

## 2019-05-21 PROBLEM — Z95.0 PRESENCE OF CARDIAC PACEMAKER: Chronic | Status: ACTIVE | Noted: 2019-03-30

## 2019-05-21 PROCEDURE — 93280 PM DEVICE PROGR EVAL DUAL: CPT

## 2019-08-05 ENCOUNTER — APPOINTMENT (OUTPATIENT)
Dept: ELECTROPHYSIOLOGY | Facility: CLINIC | Age: 69
End: 2019-08-05

## 2019-08-06 ENCOUNTER — APPOINTMENT (OUTPATIENT)
Dept: ELECTROPHYSIOLOGY | Facility: CLINIC | Age: 69
End: 2019-08-06
Payer: MEDICARE

## 2019-08-06 PROCEDURE — 93296 REM INTERROG EVL PM/IDS: CPT

## 2019-08-06 PROCEDURE — 93294 REM INTERROG EVL PM/LDLS PM: CPT

## 2019-08-29 ENCOUNTER — APPOINTMENT (OUTPATIENT)
Dept: DERMATOLOGY | Facility: CLINIC | Age: 69
End: 2019-08-29

## 2019-11-12 ENCOUNTER — APPOINTMENT (OUTPATIENT)
Dept: ELECTROPHYSIOLOGY | Facility: CLINIC | Age: 69
End: 2019-11-12
Payer: MEDICARE

## 2019-11-12 PROCEDURE — 93294 REM INTERROG EVL PM/LDLS PM: CPT

## 2019-11-12 PROCEDURE — 93296 REM INTERROG EVL PM/IDS: CPT

## 2019-12-05 ENCOUNTER — EMERGENCY (EMERGENCY)
Facility: HOSPITAL | Age: 69
LOS: 0 days | Discharge: HOME | End: 2019-12-05
Attending: EMERGENCY MEDICINE
Payer: MEDICARE

## 2019-12-05 VITALS — WEIGHT: 199.96 LBS | HEIGHT: 62 IN

## 2019-12-05 VITALS
OXYGEN SATURATION: 96 % | HEART RATE: 99 BPM | SYSTOLIC BLOOD PRESSURE: 142 MMHG | DIASTOLIC BLOOD PRESSURE: 92 MMHG | RESPIRATION RATE: 18 BRPM | TEMPERATURE: 98 F

## 2019-12-05 DIAGNOSIS — R05 COUGH: ICD-10-CM

## 2019-12-05 DIAGNOSIS — J40 BRONCHITIS, NOT SPECIFIED AS ACUTE OR CHRONIC: ICD-10-CM

## 2019-12-05 DIAGNOSIS — Z86.12 PERSONAL HISTORY OF POLIOMYELITIS: ICD-10-CM

## 2019-12-05 DIAGNOSIS — Z79.82 LONG TERM (CURRENT) USE OF ASPIRIN: ICD-10-CM

## 2019-12-05 DIAGNOSIS — I10 ESSENTIAL (PRIMARY) HYPERTENSION: ICD-10-CM

## 2019-12-05 DIAGNOSIS — E11.9 TYPE 2 DIABETES MELLITUS WITHOUT COMPLICATIONS: ICD-10-CM

## 2019-12-05 DIAGNOSIS — F17.200 NICOTINE DEPENDENCE, UNSPECIFIED, UNCOMPLICATED: ICD-10-CM

## 2019-12-05 DIAGNOSIS — Z95.0 PRESENCE OF CARDIAC PACEMAKER: ICD-10-CM

## 2019-12-05 PROCEDURE — 99285 EMERGENCY DEPT VISIT HI MDM: CPT

## 2019-12-05 PROCEDURE — 99283 EMERGENCY DEPT VISIT LOW MDM: CPT

## 2019-12-05 RX ORDER — ALBUTEROL 90 UG/1
2 AEROSOL, METERED ORAL
Qty: 1 | Refills: 0
Start: 2019-12-05

## 2019-12-05 NOTE — ED STATDOCS - PATIENT PORTAL LINK FT
You can access the FollowMyHealth Patient Portal offered by [facility] by registering at the following website: [email or web address]. By joining [facility]’s FollowMyHealth portal, you will also be able to view your health information using other applications (apps) compatible with our system.

## 2019-12-05 NOTE — ED ADULT TRIAGE NOTE - CHIEF COMPLAINT QUOTE
Patient presents complaining of chills, sweating, productive cough with dark green sputum, congestion, and lower back pain.

## 2019-12-05 NOTE — ED STATDOCS - PROGRESS NOTE DETAILS
Patient seen and evalauted with ED attending at intake.  Nontoxic appearing with persistent cough x 2 weeks.  +smoker, +asthmatic, +DM.  Will treat with antibiotics and inhaler.  Return precautions for no improvement or persistent fever reviewed -Miky Bonilla PA-C

## 2019-12-05 NOTE — ED STATDOCS - OBJECTIVE STATEMENT
68 y/o female with a PMHx of asthma, DM, HTN, pacemaker, polio presents to the ED c/o flu like symptoms. +productive cough, +subjective fever, +back pain, +chills. Pt reports she started not to feel well 2 weeks ago. Took Theraflu. Smoker. Allergies: Penicillin, Morphine, sulfa drugs. No other complaints at this time.

## 2019-12-05 NOTE — ED STATDOCS - ATTENDING CONTRIBUTION TO CARE
I, Sheila Willis MD,  performed the initial face to face bedside interview with this patient regarding history of present illness, review of symptoms and relevant past medical, social and family history.  I completed an independent physical examination.  I was the initial provider who evaluated this patient. I have signed out the follow up of any pending tests (i.e. labs, radiological studies) to the ACP.  I have communicated the patient’s plan of care and disposition with the ACP.  The history, relevant review of systems, past medical and surgical history, medical decision making, and physical examination was documented by the scribe in my presence and I attest to the accuracy of the documentation.

## 2019-12-05 NOTE — ED ADULT NURSE NOTE - NSIMPLEMENTINTERV_GEN_ALL_ED
Implemented All Fall Risk Interventions:  [place] to call system. Call bell, personal items and telephone within reach. Instruct patient to call for assistance. Room bathroom lighting operational. Non-slip footwear when patient is off stretcher. Physically safe environment: no spills, clutter or unnecessary equipment. Stretcher in lowest position, wheels locked, appropriate side rails in place. Provide visual cue, wrist band, yellow gown, etc. Monitor gait and stability. Monitor for mental status changes and reorient to person, place, and time. Review medications for side effects contributing to fall risk. Reinforce activity limits and safety measures with patient and family.

## 2020-02-18 ENCOUNTER — APPOINTMENT (OUTPATIENT)
Dept: ELECTROPHYSIOLOGY | Facility: CLINIC | Age: 70
End: 2020-02-18
Payer: MEDICARE

## 2020-02-18 PROCEDURE — 93296 REM INTERROG EVL PM/IDS: CPT

## 2020-02-18 PROCEDURE — 93294 REM INTERROG EVL PM/LDLS PM: CPT

## 2020-02-19 PROBLEM — J45.909 UNSPECIFIED ASTHMA, UNCOMPLICATED: Chronic | Status: ACTIVE | Noted: 2019-12-05

## 2020-05-22 ENCOUNTER — APPOINTMENT (OUTPATIENT)
Dept: ELECTROPHYSIOLOGY | Facility: CLINIC | Age: 70
End: 2020-05-22
Payer: MEDICARE

## 2020-05-22 PROCEDURE — 93294 REM INTERROG EVL PM/LDLS PM: CPT

## 2020-05-22 PROCEDURE — 93296 REM INTERROG EVL PM/IDS: CPT

## 2020-08-24 ENCOUNTER — APPOINTMENT (OUTPATIENT)
Dept: ELECTROPHYSIOLOGY | Facility: CLINIC | Age: 70
End: 2020-08-24

## 2020-09-15 ENCOUNTER — APPOINTMENT (OUTPATIENT)
Dept: ELECTROPHYSIOLOGY | Facility: CLINIC | Age: 70
End: 2020-09-15

## 2020-09-21 ENCOUNTER — APPOINTMENT (OUTPATIENT)
Dept: ELECTROPHYSIOLOGY | Facility: CLINIC | Age: 70
End: 2020-09-21
Payer: MEDICARE

## 2020-09-21 VITALS
DIASTOLIC BLOOD PRESSURE: 77 MMHG | OXYGEN SATURATION: 100 % | SYSTOLIC BLOOD PRESSURE: 134 MMHG | HEIGHT: 62 IN | HEART RATE: 95 BPM

## 2020-09-21 PROCEDURE — 93280 PM DEVICE PROGR EVAL DUAL: CPT

## 2020-09-21 RX ORDER — METOPROLOL TARTRATE 50 MG/1
50 TABLET, FILM COATED ORAL
Refills: 0 | Status: DISCONTINUED | COMMUNITY
End: 2020-09-21

## 2020-09-21 RX ORDER — ACETAMINOPHEN 325 MG/1
325 TABLET ORAL
Refills: 0 | Status: DISCONTINUED | COMMUNITY
End: 2020-09-21

## 2020-12-08 ENCOUNTER — APPOINTMENT (OUTPATIENT)
Dept: HOME HEALTH SERVICES | Facility: HOME HEALTH | Age: 70
End: 2020-12-08
Payer: MEDICARE

## 2020-12-08 VITALS
SYSTOLIC BLOOD PRESSURE: 110 MMHG | OXYGEN SATURATION: 98 % | HEART RATE: 91 BPM | HEIGHT: 62 IN | BODY MASS INDEX: 34.96 KG/M2 | WEIGHT: 190 LBS | TEMPERATURE: 98.1 F | DIASTOLIC BLOOD PRESSURE: 70 MMHG | RESPIRATION RATE: 16 BRPM

## 2020-12-08 DIAGNOSIS — N28.9 DISORDER OF KIDNEY AND URETER, UNSPECIFIED: ICD-10-CM

## 2020-12-08 DIAGNOSIS — K74.69 OTHER CIRRHOSIS OF LIVER: ICD-10-CM

## 2020-12-08 DIAGNOSIS — Z63.5 DISRUPTION OF FAMILY BY SEPARATION AND DIVORCE: ICD-10-CM

## 2020-12-08 PROCEDURE — 99406 BEHAV CHNG SMOKING 3-10 MIN: CPT

## 2020-12-08 PROCEDURE — 99345 HOME/RES VST NEW HIGH MDM 75: CPT

## 2020-12-08 PROCEDURE — G0008: CPT

## 2020-12-08 PROCEDURE — 90662 IIV NO PRSV INCREASED AG IM: CPT

## 2020-12-08 PROCEDURE — G0506: CPT

## 2020-12-08 PROCEDURE — 99497 ADVNCD CARE PLAN 30 MIN: CPT

## 2020-12-08 RX ORDER — GLIMEPIRIDE 2 MG/1
2 TABLET ORAL
Refills: 0 | Status: DISCONTINUED | COMMUNITY
End: 2020-12-08

## 2020-12-08 RX ORDER — MONTELUKAST 10 MG/1
10 TABLET, FILM COATED ORAL
Refills: 0 | Status: DISCONTINUED | COMMUNITY
End: 2020-12-08

## 2020-12-08 SDOH — SOCIAL STABILITY - SOCIAL INSECURITY: DISRUPTION OF FAMILY BY SEPARATION AND DIVORCE: Z63.5

## 2020-12-08 NOTE — COUNSELING
[] : foot exam [Completed] : Aspirin use discussion completed [Improve exercise tolerance] : improve exercise tolerance [Improve mobility] : improve mobility [Improve weight] : improve weight [ - New patient with 2 or more chronic conditions; CCM discussed and patient-centered care plan established] : New patient with 2 or more chronic conditions; CCM discussed and patient-centered care plan established [47801 - Tobacco Use Cessation Intermediate Greater Than 3 Minutes Up to 10 Minutes] : Tobacco Use Cessation Intermediate Greater Than 3 Minutes Up to 10 Minutes

## 2020-12-10 NOTE — REASON FOR VISIT
[Initial Eval - Existing Diagnosis] : an initial evaluation of an existing diagnosis [Pre-Visit Preparation] : pre-visit preparation was done [Intercurrent Specialty/Sub-specialty Visits] : the patient has intercurrent specialty/sub-specialty visits

## 2020-12-12 PROBLEM — N28.9 RENAL LESION: Status: ACTIVE | Noted: 2020-12-12

## 2020-12-12 PROBLEM — K74.69 OTHER CIRRHOSIS OF LIVER: Status: RESOLVED | Noted: 2020-12-12 | Resolved: 2020-12-12

## 2020-12-12 NOTE — CHRONIC CARE ASSESSMENT
[Inadequate social support] : inadequate social support [Patient Non-adherent to care plan] : patient non-adherent to care plan [Can not Exercise (Disability)] : Exercise: The patient can not exercise due to disability [Poor Adherence] : but does not adhere to the diet [High Caloric Intake] : too high in calories [Needs Less Junk Food] : needs elimination of junk food [PPS Score: ____] : Palliative Performance Scale (PPS) Score: [unfilled] [FAST Score: ____] : Functional Assessment Scale (FAST) Score: [unfilled]

## 2020-12-17 NOTE — PHYSICAL EXAM
[Normal Sclera/Conjunctiva] : normal sclera/conjunctiva [Normal Outer Ear/Nose] : the ears and nose were normal in appearance [No JVD] : no jugular venous distention [No Respiratory Distress] : no respiratory distress [Soft] : abdomen soft [No Hernias] : no hernia was discovered [Patient Refused] : rectal exam was refused by the patient [No CVA Tenderness] : no ~M costovertebral angle tenderness [Kyphosis] :  kyphosis present [No Motor Deficits] : the motor exam was normal [Oriented x3] : oriented to person, place, and time [Normal Affect] : the affect was normal [de-identified] : obese  female  in wheelchair   [de-identified] : poor  dentition  [de-identified] : paced rhytm  [de-identified] : left  breat   upper quadrant    lesion felt ? [de-identified] : obese  [de-identified] : in wheelchair

## 2020-12-17 NOTE — DATA REVIEWED
[FreeTextEntry1] : recent blood work  urine culture  and radiology reviewed \par discussed with patient /caretakers   at length and in detail \par \par

## 2020-12-17 NOTE — HISTORY OF PRESENT ILLNESS
[Patient] : patient [FreeTextEntry1] : CAD  DM   pacemaker placed  obesity    wheelchair bound  [FreeTextEntry2] :  Patient denies fever, cough, trouble breathing, rash, vomiting and diarrhea. Patient has not been in close contact with someone Covid positive.\par N95 mask ,gloves and eyewear used during visit  Total  face to  face time  is  30 minutes \par  patient is seen today for initial visit and enrollment into  a house call  program along with care manager  Annemarie\par patient is homebound due  mobility issues  \par  most of the history obtained from  patient \par Past medical history include  CAD  DM   pacemaker placed  obesity   history of femur  fracture and   wheelchair bound \par  patient  still follows with   specialists  cardiologist   dermatologist \par last hospitalization  1/2020\par diet regular  appetite  \par dysphagia none \par Weight claims to lost weight  \par constipation none \par incontinence none \par pressure/bed sores none \par Ambulates in wheelchair  \par falls no  none recenly \par Behavior good   on zoloft    seems happy \par Mood good  \par  memory  feels declining \par  sleep  good \par sensory deficits  none \par pain arm pain \par Medication refills done and reconciliation  done \par  smoker   5 cigaretes a day  Extensive  counseling of  the patient and family about dangers of smoking \par The patient understands the long-term effects and the risks with smoking\par despite that unwilling to quit \par we will continue to encourage smoking cessation \par Goals of care discussed and completed \par

## 2020-12-17 NOTE — ADDENDUM
[FreeTextEntry1] : Requesting standard wheelchair	\par  which will allow patient  more mobility-related activities of daily living (MRADLs) such as toileting, feeding, dressing, grooming, and\par bathing in customary locations in the home\par  The beneficiary’s mobility limitation cannot be sufficiently resolved by the use of an appropriately fitted\par cane or walker.\par  Use of a standard wheelchair will significantly improve the beneficiary’s ability to participate in MRADLs and\par the beneficiary will use it on a regular basis in the home.\par  The beneficiary has not expressed an unwillingness to use the manual wheelchair that is provided in the\par home.\par  The beneficiary has sufficient upper extremity function and other physical and mental capabilities needed\par to safely self-propel the manual wheelchair that is provided in the home during a typical day. Limitations\par of strength, endurance, range of motion, or coordination, presence of pain, or deformity or absence of one\par or both upper extremities are relevant to the assessment of upper extremity function.\par  The beneficiary has a caregiver who is available, willing, and able to provide assistance with the standard wheelchair.\par patient  is  a high risk for development of pressure ulcers unable to perform a functional   weight shift\par  also requesting  shower chair and raised toliet   chair  given patient  mobility issues \par

## 2020-12-20 ENCOUNTER — APPOINTMENT (OUTPATIENT)
Dept: ELECTROPHYSIOLOGY | Facility: CLINIC | Age: 70
End: 2020-12-20
Payer: MEDICARE

## 2020-12-21 PROCEDURE — 93294 REM INTERROG EVL PM/LDLS PM: CPT

## 2020-12-21 PROCEDURE — 93296 REM INTERROG EVL PM/IDS: CPT

## 2021-02-01 ENCOUNTER — APPOINTMENT (OUTPATIENT)
Dept: HOME HEALTH SERVICES | Facility: HOME HEALTH | Age: 71
End: 2021-02-01

## 2021-03-01 ENCOUNTER — NON-APPOINTMENT (OUTPATIENT)
Age: 71
End: 2021-03-01

## 2021-03-09 ENCOUNTER — NON-APPOINTMENT (OUTPATIENT)
Age: 71
End: 2021-03-09

## 2021-03-17 ENCOUNTER — NON-APPOINTMENT (OUTPATIENT)
Age: 71
End: 2021-03-17

## 2021-03-17 ENCOUNTER — APPOINTMENT (OUTPATIENT)
Dept: HOME HEALTH SERVICES | Facility: HOME HEALTH | Age: 71
End: 2021-03-17
Payer: MEDICARE

## 2021-03-17 VITALS
HEART RATE: 104 BPM | SYSTOLIC BLOOD PRESSURE: 142 MMHG | OXYGEN SATURATION: 95 % | TEMPERATURE: 98.3 F | DIASTOLIC BLOOD PRESSURE: 84 MMHG

## 2021-03-17 PROCEDURE — 0031A: CPT

## 2021-03-18 ENCOUNTER — NON-APPOINTMENT (OUTPATIENT)
Age: 71
End: 2021-03-18

## 2021-03-21 ENCOUNTER — APPOINTMENT (OUTPATIENT)
Dept: ELECTROPHYSIOLOGY | Facility: CLINIC | Age: 71
End: 2021-03-21
Payer: MEDICARE

## 2021-03-22 ENCOUNTER — NON-APPOINTMENT (OUTPATIENT)
Age: 71
End: 2021-03-22

## 2021-03-22 PROCEDURE — 93294 REM INTERROG EVL PM/LDLS PM: CPT

## 2021-03-22 PROCEDURE — 93296 REM INTERROG EVL PM/IDS: CPT

## 2021-04-07 ENCOUNTER — APPOINTMENT (OUTPATIENT)
Dept: HOME HEALTH SERVICES | Facility: HOME HEALTH | Age: 71
End: 2021-04-07

## 2021-04-07 ENCOUNTER — LABORATORY RESULT (OUTPATIENT)
Age: 71
End: 2021-04-07

## 2021-04-13 ENCOUNTER — TRANSCRIPTION ENCOUNTER (OUTPATIENT)
Age: 71
End: 2021-04-13

## 2021-04-14 ENCOUNTER — INPATIENT (INPATIENT)
Facility: HOSPITAL | Age: 71
LOS: 4 days | Discharge: HOME | DRG: 378 | End: 2021-04-19
Attending: STUDENT IN AN ORGANIZED HEALTH CARE EDUCATION/TRAINING PROGRAM | Admitting: INTERNAL MEDICINE
Payer: MEDICARE

## 2021-04-14 ENCOUNTER — TRANSCRIPTION ENCOUNTER (OUTPATIENT)
Age: 71
End: 2021-04-14

## 2021-04-14 ENCOUNTER — NON-APPOINTMENT (OUTPATIENT)
Age: 71
End: 2021-04-14

## 2021-04-14 VITALS
OXYGEN SATURATION: 94 % | SYSTOLIC BLOOD PRESSURE: 132 MMHG | DIASTOLIC BLOOD PRESSURE: 66 MMHG | HEIGHT: 62 IN | RESPIRATION RATE: 20 BRPM | TEMPERATURE: 98 F | WEIGHT: 169.98 LBS | HEART RATE: 94 BPM

## 2021-04-14 DIAGNOSIS — D64.9 ANEMIA, UNSPECIFIED: ICD-10-CM

## 2021-04-14 LAB
25(OH)D3 SERPL-MCNC: 44.4 NG/ML
ALBUMIN SERPL ELPH-MCNC: 3.9 G/DL — SIGNIFICANT CHANGE UP (ref 3.3–5)
ALBUMIN SERPL ELPH-MCNC: 4.8 G/DL
ALP BLD-CCNC: 72 U/L
ALP SERPL-CCNC: 68 U/L — SIGNIFICANT CHANGE UP (ref 40–120)
ALT FLD-CCNC: 11 U/L — LOW (ref 12–78)
ALT SERPL-CCNC: 6 U/L
ANION GAP SERPL CALC-SCNC: 18 MMOL/L
ANION GAP SERPL CALC-SCNC: 8 MMOL/L — SIGNIFICANT CHANGE UP (ref 5–17)
APTT BLD: 26.3 SEC — LOW (ref 27.5–35.5)
AST SERPL-CCNC: 15 U/L
AST SERPL-CCNC: 17 U/L — SIGNIFICANT CHANGE UP (ref 15–37)
BASOPHILS # BLD AUTO: 0.03 K/UL — SIGNIFICANT CHANGE UP (ref 0–0.2)
BASOPHILS # BLD AUTO: 0.04 K/UL
BASOPHILS NFR BLD AUTO: 0.4 % — SIGNIFICANT CHANGE UP (ref 0–2)
BASOPHILS NFR BLD AUTO: 0.5 %
BILIRUB SERPL-MCNC: 0.2 MG/DL — SIGNIFICANT CHANGE UP (ref 0.2–1.2)
BILIRUB SERPL-MCNC: <0.2 MG/DL
BUN SERPL-MCNC: 18 MG/DL
BUN SERPL-MCNC: 24 MG/DL — HIGH (ref 7–23)
CALCIUM SERPL-MCNC: 9.2 MG/DL — SIGNIFICANT CHANGE UP (ref 8.5–10.1)
CALCIUM SERPL-MCNC: 9.9 MG/DL
CHLORIDE SERPL-SCNC: 103 MMOL/L
CHLORIDE SERPL-SCNC: 108 MMOL/L — SIGNIFICANT CHANGE UP (ref 96–108)
CHOLEST SERPL-MCNC: 171 MG/DL
CO2 SERPL-SCNC: 21 MMOL/L
CO2 SERPL-SCNC: 23 MMOL/L — SIGNIFICANT CHANGE UP (ref 22–31)
CREAT SERPL-MCNC: 0.78 MG/DL
CREAT SERPL-MCNC: 1.12 MG/DL — SIGNIFICANT CHANGE UP (ref 0.5–1.3)
EOSINOPHIL # BLD AUTO: 0.08 K/UL
EOSINOPHIL # BLD AUTO: 0.1 K/UL — SIGNIFICANT CHANGE UP (ref 0–0.5)
EOSINOPHIL NFR BLD AUTO: 1.1 %
EOSINOPHIL NFR BLD AUTO: 1.5 % — SIGNIFICANT CHANGE UP (ref 0–6)
FERRITIN SERPL-MCNC: 9 NG/ML — LOW (ref 15–150)
GLUCOSE SERPL-MCNC: 100 MG/DL — HIGH (ref 70–99)
GLUCOSE SERPL-MCNC: 77 MG/DL
HCT VFR BLD CALC: 22.1 % — LOW (ref 34.5–45)
HCT VFR BLD CALC: 22.5 %
HDLC SERPL-MCNC: 76 MG/DL
HGB BLD-MCNC: 6.2 G/DL — CRITICAL LOW (ref 11.5–15.5)
HGB BLD-MCNC: 6.3 G/DL
IMM GRANULOCYTES NFR BLD AUTO: 0.3 %
IMM GRANULOCYTES NFR BLD AUTO: 0.4 % — SIGNIFICANT CHANGE UP (ref 0–1.5)
INR BLD: 1.06 RATIO — SIGNIFICANT CHANGE UP (ref 0.88–1.16)
IRON SATN MFR SERPL: 10 UG/DL — LOW (ref 30–160)
IRON SATN MFR SERPL: 2 % — LOW (ref 14–50)
LDLC SERPL CALC-MCNC: 78 MG/DL
LYMPHOCYTES # BLD AUTO: 1.76 K/UL — SIGNIFICANT CHANGE UP (ref 1–3.3)
LYMPHOCYTES # BLD AUTO: 2 K/UL
LYMPHOCYTES # BLD AUTO: 25.7 % — SIGNIFICANT CHANGE UP (ref 13–44)
LYMPHOCYTES NFR BLD AUTO: 26.3 %
MAN DIFF?: NORMAL
MCHC RBC-ENTMCNC: 19.9 PG — LOW (ref 27–34)
MCHC RBC-ENTMCNC: 20.3 PG
MCHC RBC-ENTMCNC: 28 GM/DL
MCHC RBC-ENTMCNC: 28.1 GM/DL — LOW (ref 32–36)
MCV RBC AUTO: 71.1 FL — LOW (ref 80–100)
MCV RBC AUTO: 72.3 FL
MONOCYTES # BLD AUTO: 0.56 K/UL
MONOCYTES # BLD AUTO: 0.59 K/UL — SIGNIFICANT CHANGE UP (ref 0–0.9)
MONOCYTES NFR BLD AUTO: 7.4 %
MONOCYTES NFR BLD AUTO: 8.6 % — SIGNIFICANT CHANGE UP (ref 2–14)
NEUTROPHILS # BLD AUTO: 4.33 K/UL — SIGNIFICANT CHANGE UP (ref 1.8–7.4)
NEUTROPHILS # BLD AUTO: 4.91 K/UL
NEUTROPHILS NFR BLD AUTO: 63.4 % — SIGNIFICANT CHANGE UP (ref 43–77)
NEUTROPHILS NFR BLD AUTO: 64.4 %
NONHDLC SERPL-MCNC: 95 MG/DL
PLATELET # BLD AUTO: 410 K/UL — HIGH (ref 150–400)
PLATELET # BLD AUTO: 423 K/UL
POTASSIUM SERPL-MCNC: 4.7 MMOL/L — SIGNIFICANT CHANGE UP (ref 3.5–5.3)
POTASSIUM SERPL-SCNC: 4.7 MMOL/L — SIGNIFICANT CHANGE UP (ref 3.5–5.3)
POTASSIUM SERPL-SCNC: 4.9 MMOL/L
PROT SERPL-MCNC: 7.9 G/DL
PROT SERPL-MCNC: 8.3 GM/DL — SIGNIFICANT CHANGE UP (ref 6–8.3)
PROTHROM AB SERPL-ACNC: 12.4 SEC — SIGNIFICANT CHANGE UP (ref 10.6–13.6)
RBC # BLD: 2.86 M/UL — LOW (ref 3.8–5.2)
RBC # BLD: 3.11 M/UL
RBC # BLD: 3.11 M/UL — LOW (ref 3.8–5.2)
RBC # FLD: 19.8 % — HIGH (ref 10.3–14.5)
RBC # FLD: 20.4 %
RETICS #: 42.3 K/UL — SIGNIFICANT CHANGE UP (ref 25–125)
RETICS/RBC NFR: 1.5 % — SIGNIFICANT CHANGE UP (ref 0.5–2.5)
SARS-COV-2 RNA SPEC QL NAA+PROBE: SIGNIFICANT CHANGE UP
SODIUM SERPL-SCNC: 139 MMOL/L — SIGNIFICANT CHANGE UP (ref 135–145)
SODIUM SERPL-SCNC: 142 MMOL/L
TIBC SERPL-MCNC: 423 UG/DL — SIGNIFICANT CHANGE UP (ref 220–430)
TRIGL SERPL-MCNC: 85 MG/DL
TSH SERPL-ACNC: 1.98 UIU/ML
UIBC SERPL-MCNC: 413 UG/DL — HIGH (ref 110–370)
VIT B12 SERPL-MCNC: 399 PG/ML
WBC # BLD: 6.84 K/UL — SIGNIFICANT CHANGE UP (ref 3.8–10.5)
WBC # FLD AUTO: 6.84 K/UL — SIGNIFICANT CHANGE UP (ref 3.8–10.5)
WBC # FLD AUTO: 7.61 K/UL

## 2021-04-14 PROCEDURE — 93010 ELECTROCARDIOGRAM REPORT: CPT

## 2021-04-14 PROCEDURE — 86902 BLOOD TYPE ANTIGEN DONOR EA: CPT

## 2021-04-14 PROCEDURE — 86920 COMPATIBILITY TEST SPIN: CPT

## 2021-04-14 PROCEDURE — 93306 TTE W/DOPPLER COMPLETE: CPT

## 2021-04-14 PROCEDURE — 82746 ASSAY OF FOLIC ACID SERUM: CPT

## 2021-04-14 PROCEDURE — 86870 RBC ANTIBODY IDENTIFICATION: CPT

## 2021-04-14 PROCEDURE — 82962 GLUCOSE BLOOD TEST: CPT

## 2021-04-14 PROCEDURE — 93005 ELECTROCARDIOGRAM TRACING: CPT

## 2021-04-14 PROCEDURE — 85018 HEMOGLOBIN: CPT

## 2021-04-14 PROCEDURE — 86905 BLOOD TYPING RBC ANTIGENS: CPT

## 2021-04-14 PROCEDURE — 99285 EMERGENCY DEPT VISIT HI MDM: CPT

## 2021-04-14 PROCEDURE — C9113: CPT

## 2021-04-14 PROCEDURE — 86880 COOMBS TEST DIRECT: CPT

## 2021-04-14 PROCEDURE — 82378 CARCINOEMBRYONIC ANTIGEN: CPT

## 2021-04-14 PROCEDURE — 36415 COLL VENOUS BLD VENIPUNCTURE: CPT

## 2021-04-14 PROCEDURE — 86901 BLOOD TYPING SEROLOGIC RH(D): CPT

## 2021-04-14 PROCEDURE — 86921 COMPATIBILITY TEST INCUBATE: CPT

## 2021-04-14 PROCEDURE — C1889: CPT

## 2021-04-14 PROCEDURE — 74177 CT ABD & PELVIS W/CONTRAST: CPT

## 2021-04-14 PROCEDURE — 80048 BASIC METABOLIC PNL TOTAL CA: CPT

## 2021-04-14 PROCEDURE — 36430 TRANSFUSION BLD/BLD COMPNT: CPT

## 2021-04-14 PROCEDURE — 97163 PT EVAL HIGH COMPLEX 45 MIN: CPT | Mod: GP

## 2021-04-14 PROCEDURE — 71045 X-RAY EXAM CHEST 1 VIEW: CPT | Mod: 26

## 2021-04-14 PROCEDURE — P9016: CPT

## 2021-04-14 PROCEDURE — 86900 BLOOD TYPING SEROLOGIC ABO: CPT

## 2021-04-14 PROCEDURE — 82607 VITAMIN B-12: CPT

## 2021-04-14 PROCEDURE — 86803 HEPATITIS C AB TEST: CPT

## 2021-04-14 PROCEDURE — 88305 TISSUE EXAM BY PATHOLOGIST: CPT

## 2021-04-14 PROCEDURE — 86922 COMPATIBILITY TEST ANTIGLOB: CPT

## 2021-04-14 PROCEDURE — 84484 ASSAY OF TROPONIN QUANT: CPT

## 2021-04-14 PROCEDURE — 86769 SARS-COV-2 COVID-19 ANTIBODY: CPT

## 2021-04-14 PROCEDURE — 85027 COMPLETE CBC AUTOMATED: CPT

## 2021-04-14 PROCEDURE — 85025 COMPLETE CBC W/AUTO DIFF WBC: CPT

## 2021-04-14 PROCEDURE — 99223 1ST HOSP IP/OBS HIGH 75: CPT

## 2021-04-14 PROCEDURE — P9040: CPT

## 2021-04-14 PROCEDURE — 82272 OCCULT BLD FECES 1-3 TESTS: CPT

## 2021-04-14 PROCEDURE — 86850 RBC ANTIBODY SCREEN: CPT

## 2021-04-14 PROCEDURE — 88312 SPECIAL STAINS GROUP 1: CPT

## 2021-04-14 PROCEDURE — 83036 HEMOGLOBIN GLYCOSYLATED A1C: CPT

## 2021-04-14 PROCEDURE — 71260 CT THORAX DX C+: CPT

## 2021-04-14 PROCEDURE — 87521 HEPATITIS C PROBE&RVRS TRNSC: CPT

## 2021-04-14 PROCEDURE — 85014 HEMATOCRIT: CPT

## 2021-04-14 PROCEDURE — 80053 COMPREHEN METABOLIC PANEL: CPT

## 2021-04-14 RX ORDER — INSULIN LISPRO 100/ML
VIAL (ML) SUBCUTANEOUS
Refills: 0 | Status: DISCONTINUED | OUTPATIENT
Start: 2021-04-14 | End: 2021-04-19

## 2021-04-14 RX ORDER — DEXTROSE 50 % IN WATER 50 %
25 SYRINGE (ML) INTRAVENOUS ONCE
Refills: 0 | Status: DISCONTINUED | OUTPATIENT
Start: 2021-04-14 | End: 2021-04-19

## 2021-04-14 RX ORDER — LOSARTAN POTASSIUM 100 MG/1
1 TABLET, FILM COATED ORAL
Qty: 0 | Refills: 0 | DISCHARGE

## 2021-04-14 RX ORDER — METFORMIN HYDROCHLORIDE 850 MG/1
2 TABLET ORAL
Qty: 0 | Refills: 0 | DISCHARGE

## 2021-04-14 RX ORDER — SIMVASTATIN 20 MG/1
1 TABLET, FILM COATED ORAL
Qty: 0 | Refills: 0 | DISCHARGE

## 2021-04-14 RX ORDER — SODIUM CHLORIDE 9 MG/ML
1000 INJECTION, SOLUTION INTRAVENOUS
Refills: 0 | Status: DISCONTINUED | OUTPATIENT
Start: 2021-04-14 | End: 2021-04-19

## 2021-04-14 RX ORDER — MONTELUKAST 4 MG/1
1 TABLET, CHEWABLE ORAL
Qty: 0 | Refills: 0 | DISCHARGE

## 2021-04-14 RX ORDER — GLIMEPIRIDE 1 MG
1 TABLET ORAL
Qty: 0 | Refills: 0 | DISCHARGE

## 2021-04-14 RX ORDER — SIMVASTATIN 20 MG/1
40 TABLET, FILM COATED ORAL AT BEDTIME
Refills: 0 | Status: DISCONTINUED | OUTPATIENT
Start: 2021-04-14 | End: 2021-04-19

## 2021-04-14 RX ORDER — DEXTROSE 50 % IN WATER 50 %
15 SYRINGE (ML) INTRAVENOUS ONCE
Refills: 0 | Status: DISCONTINUED | OUTPATIENT
Start: 2021-04-14 | End: 2021-04-19

## 2021-04-14 RX ORDER — LOSARTAN POTASSIUM 100 MG/1
50 TABLET, FILM COATED ORAL DAILY
Refills: 0 | Status: DISCONTINUED | OUTPATIENT
Start: 2021-04-14 | End: 2021-04-19

## 2021-04-14 RX ORDER — SERTRALINE 25 MG/1
1 TABLET, FILM COATED ORAL
Qty: 0 | Refills: 0 | DISCHARGE

## 2021-04-14 RX ORDER — FLUTICASONE PROPIONATE AND SALMETEROL 50; 250 UG/1; UG/1
1 POWDER ORAL; RESPIRATORY (INHALATION)
Qty: 0 | Refills: 0 | DISCHARGE

## 2021-04-14 RX ORDER — GLUCAGON INJECTION, SOLUTION 0.5 MG/.1ML
1 INJECTION, SOLUTION SUBCUTANEOUS ONCE
Refills: 0 | Status: DISCONTINUED | OUTPATIENT
Start: 2021-04-14 | End: 2021-04-19

## 2021-04-14 RX ORDER — SERTRALINE 25 MG/1
100 TABLET, FILM COATED ORAL DAILY
Refills: 0 | Status: DISCONTINUED | OUTPATIENT
Start: 2021-04-14 | End: 2021-04-19

## 2021-04-14 RX ORDER — MONTELUKAST 4 MG/1
10 TABLET, CHEWABLE ORAL DAILY
Refills: 0 | Status: DISCONTINUED | OUTPATIENT
Start: 2021-04-14 | End: 2021-04-19

## 2021-04-14 RX ORDER — DEXTROSE 50 % IN WATER 50 %
12.5 SYRINGE (ML) INTRAVENOUS ONCE
Refills: 0 | Status: DISCONTINUED | OUTPATIENT
Start: 2021-04-14 | End: 2021-04-19

## 2021-04-14 RX ADMIN — MONTELUKAST 10 MILLIGRAM(S): 4 TABLET, CHEWABLE ORAL at 23:56

## 2021-04-14 RX ADMIN — LOSARTAN POTASSIUM 50 MILLIGRAM(S): 100 TABLET, FILM COATED ORAL at 23:56

## 2021-04-14 NOTE — H&P ADULT - HISTORY OF PRESENT ILLNESS
HPI: The patient is a 71 yo female with Hx. of DM2, HTN, post Polio at age 5, wheel chair bound, s/p PPM,. was sent to  the ER by PCP Dr. Chávez because severe anemia detected on lab work done yesterday. The patient is also c/o intermittent chest pain not radiating. Rectal exam neg for blood done by ED MD.     PMHx:  DM2, HTN, post Polio osteopathy, SSS s/p PPM    PSHx: LH Cath 21 non obstr CAD, EF 55%,       Family Hx: Father  during heart valve surgery, mother  from Lung CA    Social Hx.: not smoking, no alcohol use    ROS:   Eyes: no changes in vision    ENT/Mouth: no changes    Cardiovascular:  intermittent chest pain    Respiratory: no SOB    Gastrointestinal: no diarrhea, no nausea, no vomiting    Genitourinary: no dysuria    Breast: no pain    Musculoskeletal: no pain    Integumentary: no itching    Neurological: No Headache, no tremor,    Psychiatric: no suicidal ideations    Endocrine: no excessive thirst,     Hematologic/Lymphatic: no swollen glands    Allergic/Immunologic: no itching      Physical Exam: Vital Signs Last 24 Hrs  T(C): 36.7 (2021 20:11), Max: 36.7 (2021 15:15)  T(F): 98.1 (2021 20:11), Max: 98.1 (2021 15:15)  HR: 82 (2021 20:11) (82 - 94)  BP: 117/50 (2021 20:11) (112/60 - 132/66)  BP(mean): --  RR: 16 (2021 20:11) (12 - 20)  SpO2: 100% (2021 20:11) (94% - 100%)        HEENT: PRRL EOMI    MOUTH/TEETH/GUMS: Clear    NECK: no JVD    LUNGS: Clear    HEART: S1,S2 RR    ABDOMEN: soft nontender    EXTREMITIES:  no pedal edema    MUSCULOSKELETAL: no joint swelling     NEURO: no tremor, no focal signs.    SKIN: no rash    : CVA negative,     Lab:                       6.2    6.84  )-----------( 410      ( 2021 11:25 )             22.1       04-14    139  |  108  |  24<H>  ----------------------------<  100<H>  4.7   |  23  |  1.12    Ca    9.2      2021 11:25    TPro  8.3  /  Alb  3.9  /  TBili  0.2  /  DBili  x   /  AST  17  /  ALT  11<L>  /  AlkPhos  68  04-14      Iron 10,    HPI: The patient is a 71 yo female with Hx. of DM2, HTN, post Polio at age 5, wheel chair bound, s/p PPM,. was sent to  the ER by PCP Dr. Chávez because severe anemia detected on lab work done yesterday. The patient is also c/o intermittent chest pain not radiating. Rectal exam neg for blood done by ED MD.     PMHx:  DM2, HTN, post Polio osteopathy, SSS s/p PPM    PSHx: LH Cath 18 non obstr CAD, EF 55%,       Family Hx: Father  during heart valve surgery, mother  from Lung CA    Social Hx.: not smoking, no alcohol use    ROS:   Eyes: no changes in vision    ENT/Mouth: no changes    Cardiovascular:  intermittent chest pain    Respiratory: no SOB    Gastrointestinal: no diarrhea, no nausea, no vomiting    Genitourinary: no dysuria    Breast: no pain    Musculoskeletal: no pain    Integumentary: no itching    Neurological: No Headache, no tremor,    Psychiatric: no suicidal ideations    Endocrine: no excessive thirst,     Hematologic/Lymphatic: no swollen glands    Allergic/Immunologic: no itching      Physical Exam: Vital Signs Last 24 Hrs  T(C): 36.7 (2021 20:11), Max: 36.7 (2021 15:15)  T(F): 98.1 (2021 20:11), Max: 98.1 (2021 15:15)  HR: 82 (2021 20:11) (82 - 94)  BP: 117/50 (2021 20:11) (112/60 - 132/66)  BP(mean): --  RR: 16 (2021 20:11) (12 - 20)  SpO2: 100% (2021 20:11) (94% - 100%)        HEENT: PRRL EOMI    MOUTH/TEETH/GUMS: Clear    NECK: no JVD    LUNGS: Clear    HEART: S1,S2 RR    ABDOMEN: soft nontender    EXTREMITIES:  no pedal edema    MUSCULOSKELETAL: no joint swelling     NEURO: no tremor, no focal signs.    SKIN: no rash    : CVA negative,     Lab:                       6.2    6.84  )-----------( 410      ( 2021 11:25 )             22.1       -14    139  |  108  |  24<H>  ----------------------------<  100<H>  4.7   |  23  |  1.12    Ca    9.2      2021 11:25    TPro  8.3  /  Alb  3.9  /  TBili  0.2  /  DBili  x   /  AST  17  /  ALT  11<L>  /  AlkPhos  68  04-14      Iron 10,

## 2021-04-14 NOTE — ED STATDOCS - PROGRESS NOTE DETAILS
Romero MOCK for Dr. Edwards: 71 y/o female with a PMHx of Asthma, DM, HTN, Pacemaker, presents to the ED for abnormal labs. Pt had recent blood work showing low hgb (6.3). PMD: Dr. Fletcher. Pt has generalized pallor and pale conjunctiva, Will send pt to main ED for further evaluation.

## 2021-04-14 NOTE — PATIENT PROFILE ADULT - ARE SIGNIFICANT INDICATORS COMPLETE.
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    INDICATION:  The patient presents for exchange of garcia catheter. The risks, benefits and alternatives have been explained. The patient's questions have been answered. The patient agrees to the procedure. Patient reports that sinus tract continues to heal and has some continued drainage. This is no longer being packed. He was seen by General Surgery on 10/22/19. Note indicates wound appears to have healed with no follow-up needed.    DESCRIPTION OF PROCEDURE:  Patient was prepped and draped in the usual sterile fashion. The catheter was disconnected from the bag and betadine was applied. A wire was passed down the existing catheter until it entered the bladder. 10 cc of sterile water was removed from existing catheter balloon and the catheter was removed in tact without difficulty. A new 18 [place] latex [place] tipped catheter was then passed over the wire. Catheter balloon was inflated with 10 cc sterile water. The wire was then removed and catheter was connected to a drainage bag. Patient tolerated the procedure well. Catheter exchange is complex due to needing to use a wire to replace.      Plan:  Discussed removing the catheter today with patient, his wife, and Dr. Villalobos. Patient's wife was shown how to remove the catheter. This will be done 6-8 hours prior to his next appointment to determine whether or not he is able to urinate or needs to have the catheter replaced.    RED Ariza        
Yes

## 2021-04-14 NOTE — ED STATDOCS - NS_ ATTENDINGSCRIBEDETAILS _ED_A_ED_FT
I, Yovani Edwards MD,  performed the initial face to face bedside interview with this patient regarding history of present illness, review of symptoms and relevant past medical, social and family history.  I completed an independent physical examination.    The history, relevant review of systems, past medical and surgical history, medical decision making, and physical examination was documented by the scribe in my presence and I attest to the accuracy of the documentation.

## 2021-04-14 NOTE — ED PROVIDER NOTE - NS ED MD DISPO SPECIAL CONSIDERATION1
Preventive Health Recommendations  Male Ages 21 - 25     *   Flu shot today.     *   Nothing new with eczema.     *   Can't do much about the pimples.     *   Remember the safety issues.     Yearly exam:             See your health care provider every year in order to  o   Review health changes.   o   Discuss preventive care.    o   Review your medicines if your doctor has prescribed any.    You should be tested each year for STDs (sexually transmitted diseases).     Talk to your provider about cholesterol testing.      If you are at risk for diabetes, you should have a diabetes test (fasting glucose).    Shots: Get a flu shot each year. Get a tetanus shot every 10 years.     Nutrition:    Eat at least 5 servings of fruits and vegetables daily.     Eat whole-grain bread, whole-wheat pasta and brown rice instead of white grains and rice.     Get adequate calcium and Vitamin D.     Lifestyle    Exercise for at least 150 minutes a week (30 minutes a day, 5 days a week). This will help you control your weight and prevent disease.     Limit alcohol to one drink per day.     No smoking.     Wear sunscreen to prevent skin cancer.     See your dentist every six months for an exam and cleaning.      Low Suspicion of COVID-19

## 2021-04-14 NOTE — PATIENT PROFILE ADULT - NSPROHMSYMPCOND_GEN_A_NUR
----- Message from Vipin Barrios sent at 7/29/2020  8:35 AM EDT -----  Regarding: work note  Contact: [phone]  Pt has another fax number for the work note [phone].  
See note of 7/22.  Work note faxed as requested - confirmation received.  See media tab.   
cardiovascular

## 2021-04-14 NOTE — ED PROVIDER NOTE - OBJECTIVE STATEMENT
71 y/o female w/ PMHx of Polio, Asthma, DM, HTN, Pacemaker, anemia x years ago not on iron, presents to the ED for abnormal labs. Pt had blood work x1 day ago showing low hgb (6.3). PMD: Dr. Fletcher. Pt endorses fatigue. Pt denies hematemesis or vomiting, CP, SOB. Unknown blood in stool or melena. Pt is wheelchair bound at baseline. Takes ASA. 69 y/o female w/ PMHx of Polio, Asthma, DM, HTN, Pacemaker, anemia x years ago not on iron, presents to the ED for abnormal labs. Pt had blood work x1 day ago showing low hgb (6.3). PMD: Dr. Fletcher. Pt endorses generalized fatigue. Pt denies hematemesis or vomiting, CP, SOB. Unknown blood in stool or melena. Pt is wheelchair bound at baseline. Takes ASA.

## 2021-04-14 NOTE — ED PROVIDER NOTE - PROGRESS NOTE DETAILS
Romero Zaragoza for attending Dr. Dover: spoke to Dr. Nobles for heme, CBC reviewed, reports that if pt guaiac negative, would recommen PRBCs but can follow up now as admission for further w/u. Romero Zaragoza for attending Dr. Dover: spoke to Dr. Nobles for heme, CBC reviewed, reports that if pt guaiac negative, would recommend PRBCs but can follow up as outpatient for further work up. Stanislaw DO: Patient's hemoglobin- 6.3 on 4/7 and 6.2 today (1 week later); Rectal: brown stool guaiac NEGATIVE; will transfuse 2 units prbcs; care manager to arrange for outpatient f/u with Dr. Nobles; endorsed to Dr. Angeles pending transfusion of 2 units PRBCs and re-evaluation at this time.

## 2021-04-14 NOTE — ED ADULT NURSE NOTE - OBJECTIVE STATEMENT
pt sent to ED by PMD for low Hgb levels on routine blood work, drawn yesterday. pt denies SOB, chest and abd pain. reports increased fatigue and CONLEY. pt AOx4, breathing symmetrical and unlabored, #20 IV inserted into R. AC, bloods drawn and sent to lab, vein collapsed after blood work, pt swabbed for COVID as ordered, in no acute distress at this time, will continue to monitor.

## 2021-04-14 NOTE — ED ADULT NURSE NOTE - NSIMPLEMENTINTERV_GEN_ALL_ED
Implemented All Fall with Harm Risk Interventions:  [place] to call system. Call bell, personal items and telephone within reach. Instruct patient to call for assistance. Room bathroom lighting operational. Non-slip footwear when patient is off stretcher. Physically safe environment: no spills, clutter or unnecessary equipment. Stretcher in lowest position, wheels locked, appropriate side rails in place. Provide visual cue, wrist band, yellow gown, etc. Monitor gait and stability. Monitor for mental status changes and reorient to person, place, and time. Review medications for side effects contributing to fall risk. Reinforce activity limits and safety measures with patient and family. Provide visual clues: red socks.

## 2021-04-14 NOTE — H&P ADULT - ASSESSMENT
71 yo female with Hx. of DM2, HTN, post Polio at age 5, wheel chair bound, s/p PPM,. was sent to  the ER by PCP Dr. Chávez because severe anemia detected on lab work done yesterday. The patient is also c/o intermittent chest pain not radiating. Rectal exam neg for blood done by ED MD.   assessment Dx:                       1. Iron deff Anemia                        2. Chest pain                        3. DM2                       4. HTN                       5. Post polio osteopathy    Plan:    admit to Telemetry                medications: as per med rec.                VTEP: Heparin               Labs: cbc,bmp               Radiology: cxray               Cardiac diagnostics: EKG s/p PPM no acute ST changes               Consults: Hematology, Cardiology                                 71 yo female with Hx. of DM2, HTN, post Polio at age 5, wheel chair bound, s/p PPM,. was sent to  the ER by PCP Dr. Chávez because severe anemia detected on lab work done yesterday. The patient is also c/o intermittent chest pain not radiating. Rectal exam neg for blood done by ED MD. assessment Dx:                       1. Iron deff Anemia                        2. Chest pain                        3. DM2                       4. HTN                       5. Post polio osteopathy    Plan:    admit to Telemetry                medications: as per med rec. RBC transfusion ordered pending match.                 VTEP: Heparin               Labs: cbc,bmp               Radiology: cxray               Cardiac diagnostics: EKG s/p PPM no acute ST changes               Consults: Hematology Dr Nobles , Cardiology

## 2021-04-15 ENCOUNTER — NON-APPOINTMENT (OUTPATIENT)
Age: 71
End: 2021-04-15

## 2021-04-15 LAB
A1C WITH ESTIMATED AVERAGE GLUCOSE RESULT: 5.5 % — SIGNIFICANT CHANGE UP (ref 4–5.6)
ADD ON TEST-SPECIMEN IN LAB: SIGNIFICANT CHANGE UP
ANION GAP SERPL CALC-SCNC: 4 MMOL/L — LOW (ref 5–17)
BASOPHILS # BLD AUTO: 0.03 K/UL — SIGNIFICANT CHANGE UP (ref 0–0.2)
BASOPHILS NFR BLD AUTO: 0.5 % — SIGNIFICANT CHANGE UP (ref 0–2)
BUN SERPL-MCNC: 22 MG/DL — SIGNIFICANT CHANGE UP (ref 7–23)
CALCIUM SERPL-MCNC: 8.6 MG/DL — SIGNIFICANT CHANGE UP (ref 8.5–10.1)
CHLORIDE SERPL-SCNC: 106 MMOL/L — SIGNIFICANT CHANGE UP (ref 96–108)
CO2 SERPL-SCNC: 25 MMOL/L — SIGNIFICANT CHANGE UP (ref 22–31)
COVID-19 SPIKE DOMAIN AB INTERP: POSITIVE
COVID-19 SPIKE DOMAIN ANTIBODY RESULT: 18.8 U/ML — HIGH
CREAT SERPL-MCNC: 0.95 MG/DL — SIGNIFICANT CHANGE UP (ref 0.5–1.3)
EOSINOPHIL # BLD AUTO: 0.2 K/UL — SIGNIFICANT CHANGE UP (ref 0–0.5)
EOSINOPHIL NFR BLD AUTO: 3.3 % — SIGNIFICANT CHANGE UP (ref 0–6)
ESTIMATED AVERAGE GLUCOSE: 111 MG/DL — SIGNIFICANT CHANGE UP (ref 68–114)
GLUCOSE SERPL-MCNC: 84 MG/DL — SIGNIFICANT CHANGE UP (ref 70–99)
HAPTOGLOB SERPL-MCNC: 261 MG/DL — HIGH (ref 34–200)
HCT VFR BLD CALC: 19.9 % — CRITICAL LOW (ref 34.5–45)
HCV AB S/CO SERPL IA: 12.24 S/CO — HIGH (ref 0–0.99)
HCV AB SERPL-IMP: REACTIVE
HGB BLD-MCNC: 5.7 G/DL — CRITICAL LOW (ref 11.5–15.5)
IMM GRANULOCYTES NFR BLD AUTO: 0.3 % — SIGNIFICANT CHANGE UP (ref 0–1.5)
LYMPHOCYTES # BLD AUTO: 2 K/UL — SIGNIFICANT CHANGE UP (ref 1–3.3)
LYMPHOCYTES # BLD AUTO: 33.4 % — SIGNIFICANT CHANGE UP (ref 13–44)
MCHC RBC-ENTMCNC: 20.2 PG — LOW (ref 27–34)
MCHC RBC-ENTMCNC: 28.6 GM/DL — LOW (ref 32–36)
MCV RBC AUTO: 70.6 FL — LOW (ref 80–100)
MONOCYTES # BLD AUTO: 0.55 K/UL — SIGNIFICANT CHANGE UP (ref 0–0.9)
MONOCYTES NFR BLD AUTO: 9.2 % — SIGNIFICANT CHANGE UP (ref 2–14)
NEUTROPHILS # BLD AUTO: 3.18 K/UL — SIGNIFICANT CHANGE UP (ref 1.8–7.4)
NEUTROPHILS NFR BLD AUTO: 53.3 % — SIGNIFICANT CHANGE UP (ref 43–77)
PLATELET # BLD AUTO: 411 K/UL — HIGH (ref 150–400)
POTASSIUM SERPL-MCNC: 4.2 MMOL/L — SIGNIFICANT CHANGE UP (ref 3.5–5.3)
POTASSIUM SERPL-SCNC: 4.2 MMOL/L — SIGNIFICANT CHANGE UP (ref 3.5–5.3)
RBC # BLD: 2.82 M/UL — LOW (ref 3.8–5.2)
RBC # FLD: 19.5 % — HIGH (ref 10.3–14.5)
SARS-COV-2 IGG+IGM SERPL QL IA: 18.8 U/ML — HIGH
SARS-COV-2 IGG+IGM SERPL QL IA: POSITIVE
SODIUM SERPL-SCNC: 135 MMOL/L — SIGNIFICANT CHANGE UP (ref 135–145)
TROPONIN I SERPL-MCNC: <0.015 NG/ML — SIGNIFICANT CHANGE UP (ref 0.01–0.04)
TROPONIN I SERPL-MCNC: <0.015 NG/ML — SIGNIFICANT CHANGE UP (ref 0.01–0.04)
WBC # BLD: 5.98 K/UL — SIGNIFICANT CHANGE UP (ref 3.8–10.5)
WBC # FLD AUTO: 5.98 K/UL — SIGNIFICANT CHANGE UP (ref 3.8–10.5)

## 2021-04-15 PROCEDURE — 86079 PHYS BLOOD BANK SERV AUTHRJ: CPT | Mod: 59

## 2021-04-15 PROCEDURE — 99222 1ST HOSP IP/OBS MODERATE 55: CPT

## 2021-04-15 PROCEDURE — 99233 SBSQ HOSP IP/OBS HIGH 50: CPT

## 2021-04-15 PROCEDURE — 71260 CT THORAX DX C+: CPT | Mod: 26

## 2021-04-15 PROCEDURE — 86077 PHYS BLOOD BANK SERV XMATCH: CPT

## 2021-04-15 PROCEDURE — 93280 PM DEVICE PROGR EVAL DUAL: CPT | Mod: 26

## 2021-04-15 PROCEDURE — 74177 CT ABD & PELVIS W/CONTRAST: CPT | Mod: 26

## 2021-04-15 RX ORDER — IRON SUCROSE 20 MG/ML
200 INJECTION, SOLUTION INTRAVENOUS
Refills: 0 | Status: DISCONTINUED | OUTPATIENT
Start: 2021-04-15 | End: 2021-04-15

## 2021-04-15 RX ORDER — ACETAMINOPHEN 500 MG
650 TABLET ORAL EVERY 6 HOURS
Refills: 0 | Status: DISCONTINUED | OUTPATIENT
Start: 2021-04-15 | End: 2021-04-19

## 2021-04-15 RX ORDER — PANTOPRAZOLE SODIUM 20 MG/1
40 TABLET, DELAYED RELEASE ORAL DAILY
Refills: 0 | Status: DISCONTINUED | OUTPATIENT
Start: 2021-04-15 | End: 2021-04-19

## 2021-04-15 RX ORDER — SOD SULF/SODIUM/NAHCO3/KCL/PEG
2000 SOLUTION, RECONSTITUTED, ORAL ORAL ONCE
Refills: 0 | Status: COMPLETED | OUTPATIENT
Start: 2021-04-15 | End: 2021-04-15

## 2021-04-15 RX ORDER — ONDANSETRON 8 MG/1
4 TABLET, FILM COATED ORAL EVERY 8 HOURS
Refills: 0 | Status: DISCONTINUED | OUTPATIENT
Start: 2021-04-15 | End: 2021-04-19

## 2021-04-15 RX ORDER — IRON SUCROSE 20 MG/ML
200 INJECTION, SOLUTION INTRAVENOUS
Refills: 0 | Status: COMPLETED | OUTPATIENT
Start: 2021-04-15 | End: 2021-04-18

## 2021-04-15 RX ORDER — SOD SULF/SODIUM/NAHCO3/KCL/PEG
2000 SOLUTION, RECONSTITUTED, ORAL ORAL ONCE
Refills: 0 | Status: DISCONTINUED | OUTPATIENT
Start: 2021-04-15 | End: 2021-04-16

## 2021-04-15 RX ADMIN — IRON SUCROSE 200 MILLIGRAM(S): 20 INJECTION, SOLUTION INTRAVENOUS at 17:43

## 2021-04-15 RX ADMIN — Medication 1: at 12:02

## 2021-04-15 RX ADMIN — SIMVASTATIN 40 MILLIGRAM(S): 20 TABLET, FILM COATED ORAL at 22:18

## 2021-04-15 RX ADMIN — LOSARTAN POTASSIUM 50 MILLIGRAM(S): 100 TABLET, FILM COATED ORAL at 09:23

## 2021-04-15 RX ADMIN — PANTOPRAZOLE SODIUM 40 MILLIGRAM(S): 20 TABLET, DELAYED RELEASE ORAL at 09:24

## 2021-04-15 RX ADMIN — Medication 2000 MILLILITER(S): at 22:19

## 2021-04-15 RX ADMIN — Medication 650 MILLIGRAM(S): at 15:17

## 2021-04-15 RX ADMIN — MONTELUKAST 10 MILLIGRAM(S): 4 TABLET, CHEWABLE ORAL at 09:23

## 2021-04-15 RX ADMIN — SERTRALINE 100 MILLIGRAM(S): 25 TABLET, FILM COATED ORAL at 09:24

## 2021-04-15 NOTE — PROGRESS NOTE ADULT - ASSESSMENT
69 yo female with Hx. of DM2, HTN, post Polio at age 5, wheel chair bound, s/p PPM,, with 30 lbs weight loss in one year, was sent to  the ER by PCP Dr. Chávez  because of severe anemia detected on lab work done yesterday. The patient is also c/o intermittent chest pain not radiating. Rectal exam neg for blood done by ED.    # Acute iron deficient anemia   H dropped from 6.2 on admission to 5.7   Rectal exam neg for blood done by ED  symptomatic - feels weak   concern for malignancy - lost 39 lbs in one year   - transfuse 2 units prbc  - check H/H at 1800 and in AM   - f/u anemia panel   - check stool occult blood   - check CEA  - start iron infusion   - CT of chest, abdomen and pelvis to r/o malignancy   - monitor H/H   - GI consult pending  - Heme/onc consult appreciated     # Chest pain   - resolved, in the setting of acute anemia  - trops neg x 2  - pt. with PPM - EP will interrogate   - TTE  - Cardiology consult pending     # HTN  - controlled  - cont. losartan    # Post polio osteopathy   wheechair bound   PT

## 2021-04-15 NOTE — CONSULT NOTE ADULT - SUBJECTIVE AND OBJECTIVE BOX
Patient is a 70y old  Female who presents with a chief complaint of Anemia , Chest pain (15 Apr 2021 08:46)      HPI:  HPI: The patient is a 69 yo female with Hx. of DM2, HTN, post Polio at age 5, wheel chair bound, s/p PPM,. was sent to  the ER by PCP Dr. Chávez because severe anemia detected on lab work done yesterday. The patient is also c/o intermittent chest pain not radiating. Rectal exam neg for blood done by ED MD.     PMHx:  DM2, HTN, post Polio osteopathy, SSS s/p PPM    PSHx: LH Cath 18 non obstr CAD, EF 55%,       Family Hx: Father  during heart valve surgery, mother  from Lung CA    Social Hx.: not smoking, no alcohol use    ROS:   Eyes: no changes in vision    ENT/Mouth: no changes    Cardiovascular:  intermittent chest pain    Respiratory: no SOB    Gastrointestinal: no diarrhea, no nausea, no vomiting    Genitourinary: no dysuria    Breast: no pain    Musculoskeletal: no pain    Integumentary: no itching    Neurological: No Headache, no tremor,    Psychiatric: no suicidal ideations    Endocrine: no excessive thirst,     Hematologic/Lymphatic: no swollen glands    Allergic/Immunologic: no itching      Physical Exam: Vital Signs Last 24 Hrs  T(C): 36.7 (2021 20:11), Max: 36.7 (2021 15:15)  T(F): 98.1 (2021 20:11), Max: 98.1 (2021 15:15)  HR: 82 (2021 20:11) (82 - 94)  BP: 117/50 (2021 20:11) (112/60 - 132/66)  BP(mean): --  RR: 16 (2021 20:11) (12 - 20)  SpO2: 100% (2021 20:11) (94% - 100%)        HEENT: PRRL EOMI    MOUTH/TEETH/GUMS: Clear    NECK: no JVD    LUNGS: Clear    HEART: S1,S2 RR    ABDOMEN: soft nontender    EXTREMITIES:  no pedal edema    MUSCULOSKELETAL: no joint swelling     NEURO: no tremor, no focal signs.    SKIN: no rash    : CVA negative,     Lab:                       6.2    6.84  )-----------( 410      ( 2021 11:25 )             22.1       04-14    139  |  108  |  24<H>  ----------------------------<  100<H>  4.7   |  23  |  1.12    Ca    9.2      2021 11:25    TPro  8.3  /  Alb  3.9  /  TBili  0.2  /  DBili  x   /  AST  17  /  ALT  11<L>  /  AlkPhos  68        Iron 10,    (2021 21:18)      PAST MEDICAL & SURGICAL HISTORY:  DM (diabetes mellitus)    HTN (hypertension)    Polio    Pacemaker    Asthma    No significant past surgical history        PREVIOUS CARDIAC WORKUP:      Echo:  Stress Test:  Cardiac Cath:    ALLERGIES:    morphine (Unknown)  penicillin (Unknown)  sulfa drugs (Unknown)       MEDICATIONS  (STANDING):  dextrose 40% Gel 15 Gram(s) Oral once  dextrose 5%. 1000 milliLiter(s) (50 mL/Hr) IV Continuous <Continuous>  dextrose 5%. 1000 milliLiter(s) (100 mL/Hr) IV Continuous <Continuous>  dextrose 50% Injectable 25 Gram(s) IV Push once  dextrose 50% Injectable 12.5 Gram(s) IV Push once  dextrose 50% Injectable 25 Gram(s) IV Push once  glucagon  Injectable 1 milliGRAM(s) IntraMuscular once  insulin lispro (ADMELOG) corrective regimen sliding scale   SubCutaneous three times a day before meals  iron sucrose Injectable 200 milliGRAM(s) IV Push <User Schedule>  losartan 50 milliGRAM(s) Oral daily  montelukast 10 milliGRAM(s) Oral daily  pantoprazole  Injectable 40 milliGRAM(s) IV Push daily  sertraline 100 milliGRAM(s) Oral daily  simvastatin 40 milliGRAM(s) Oral at bedtime    MEDICATIONS  (PRN):      FAMILY HISTORY:  Family history of early CAD (Child)          SOCIAL HISTORY:  .scl     ROS:     .ros    Vital Signs Last 24 Hrs  T(C): 36.6 (15 Apr 2021 13:42), Max: 36.9 (2021 23:54)  T(F): 97.8 (15 Apr 2021 13:42), Max: 98.4 (2021 23:54)  HR: 82 (15 Apr 2021 13:42) (75 - 84)  BP: 126/59 (15 Apr 2021 13:42) (104/80 - 127/56)  BP(mean): 76 (2021 23:54) (76 - 76)  RR: 18 (15 Apr 2021 13:42) (12 - 18)  SpO2: 100% (15 Apr 2021 13:42) (97% - 100%)    I&O's Summary      PHYSICAL EXAM:    .phy      TELEMETRY:    ECG:    LABS:                          5.7    5.98  )-----------( 411      ( 15 Apr 2021 07:24 )             19.9     04-15    135  |  106  |  22  ----------------------------<  84  4.2   |  25  |  0.95    Ca    8.6      15 Apr 2021 07:24    TPro  8.3  /  Alb  3.9  /  TBili  0.2  /  DBili  x   /  AST  17  /  ALT  11<L>  /  AlkPhos  68  04-14        04-15 @ 01:14  Trop-I  <0.015  CK      --  CK-MB   --     @ 23:37  Trop-I  <0.015  CK      --  CK-MB   --      PT/INR - ( 2021 11:25 )   PT: 12.4 sec;   INR: 1.06 ratio         PTT - ( 2021 11:25 )  PTT:26.3 sec          RADIOLOGY & ADDITIONAL STUDIES:     Patient is a 70y old  Female who presents with a chief complaint of Anemia , Chest pain (15 Apr 2021 08:46)      HPI:  HPI: The patient is a 71 yo female with Hx. of DM2, HTN, post Polio at age 5, wheel chair bound, s/p PPM,. She was sent to  the ER by PCP Dr. Chávez because severe anemia detected on lab work. The patient is also c/o intermittent chest pain not radiating. Constant discomfort.  Non radiating. +ve shortness of breath.  Rectal exam neg for blood done by ED MD.     PSHx: LH Cath 18 non obstr CAD, EF 55%,       Family Hx: Father  during heart valve surgery, mother  from Lung CA    Social Hx.: not smoking, no alcohol use      PAST MEDICAL & SURGICAL HISTORY:  DM (diabetes mellitus)  HTN (hypertension)  Polio  Pacemaker  Asthma      PREVIOUS CARDIAC WORKUP:      Cardiac Cath Lab - Adult (18 @ 10:57) >   Diagnostic Conclusions     Non obstructive coronary artery disease.   Normal LV systolicfunction. Estimated LV ejection fraction is 55 %.   No aortic valve stenosis.     Recommendations     Manage with medical therapy.   Aggressive medical management of coronary artery disease and its underlying risk factors.        ALLERGIES:    morphine (Unknown)  penicillin (Unknown)  sulfa drugs (Unknown)       MEDICATIONS  (STANDING):  dextrose 40% Gel 15 Gram(s) Oral once  dextrose 5%. 1000 milliLiter(s) (50 mL/Hr) IV Continuous <Continuous>  dextrose 5%. 1000 milliLiter(s) (100 mL/Hr) IV Continuous <Continuous>  dextrose 50% Injectable 25 Gram(s) IV Push once  dextrose 50% Injectable 12.5 Gram(s) IV Push once  dextrose 50% Injectable 25 Gram(s) IV Push once  glucagon  Injectable 1 milliGRAM(s) IntraMuscular once  insulin lispro (ADMELOG) corrective regimen sliding scale   SubCutaneous three times a day before meals  iron sucrose Injectable 200 milliGRAM(s) IV Push <User Schedule>  losartan 50 milliGRAM(s) Oral daily  montelukast 10 milliGRAM(s) Oral daily  pantoprazole  Injectable 40 milliGRAM(s) IV Push daily  sertraline 100 milliGRAM(s) Oral daily  simvastatin 40 milliGRAM(s) Oral at bedtime      FAMILY HISTORY:  Family history of early CAD (Child)          SOCIAL HISTORY:  Nonsmoker. No ETOH abuse. No illicit drugs.     ROS:     Detailed ten system ROS was performed and was negative except for history as eluded to above.    no fever  no chills  no nausea  no vomiting  no diarrhea  no constipation  no melena  no hematochezia  no chest pain  - resolved   no palpitations  no sob at rest  no dyspnea on exertion  no cough  no wheezing  no anorexia  no headache  no dizziness  no syncope  no weakness  no myalgia  no dysuria  no polyuria  no hematuria      Vital Signs Last 24 Hrs  T(C): 36.6 (15 Apr 2021 13:42), Max: 36.9 (2021 23:54)  T(F): 97.8 (15 Apr 2021 13:42), Max: 98.4 (2021 23:54)  HR: 82 (15 Apr 2021 13:42) (75 - 84)  BP: 126/59 (15 Apr 2021 13:42) (104/80 - 127/56)  BP(mean): 76 (2021 23:54) (76 - 76)  RR: 18 (15 Apr 2021 13:42) (12 - 18)  SpO2: 100% (15 Apr 2021 13:42) (97% - 100%)      PHYSICAL EXAM:    General:                Comfortable, AAO X 3, in no distress. Pallor. Obese.   HEENT:                  Atraumatic, PERRLA, EOMI, conjunctiva clear.    Neck:                     Supple, no adenopathy, no thyromegaly, no JVD, no bruit.  Back:                     Symmetric, non tender.  Chest:                    Clear, B/L symmetric air entry, no tachypnea  Heart:                     S1, S2 normal, no gallop, no murmur.  Abdomen:              Soft, non-tender, bowel sounds active. No palpable masses.  Extremities:           no cyanosis, no edema. Peripheral pulses normal.  Skin:                      Skin color, texture normal. No rashes.  Neurologic:            Grossly nonfocal.       TELEMETRY:   Normal sinus rhythm, V paced.      ECG:    A sensed, V paced    LABS:                        5.7    5.98  )-----------( 411      ( 15 Apr 2021 07:24 )             19.9     -15    135  |  106  |  22  ----------------------------<  84  4.2   |  25  |  0.95    Ca    8.6      15 Apr 2021 07:24    TPro  8.3  /  Alb  3.9  /  TBili  0.2  /  DBili  x   /  AST  17  /  ALT  11<L>  /  AlkPhos  68          04-15 @ 01:14  Trop-I  <0.015     @ 23:37  Trop-I  <0.015      PT/INR - ( 2021 11:25 )   PT: 12.4 sec;   INR: 1.06 ratio    PTT - ( 2021 11:25 )  PTT:26.3 sec      RADIOLOGY & ADDITIONAL STUDIES:    Xray Chest 1 View AP/PA. (21 @ 11:27) >  IMPRESSION: No acute finding or change.

## 2021-04-15 NOTE — CONSULT NOTE ADULT - ASSESSMENT
69 y/o female admitted with symptomatic severe iron deficiency anemia  No overt active bleeding.  Hx of weight loss in last 6-12 months.  R/o GI malignancy.  Comorbidities: DM, HTN, s/ p polio, wheelchair bound.    Transfuse PRBC.  iv iron   GI w/up   CEA   CT abd pelvis with contrast .    D/w Dr Kapoor.

## 2021-04-15 NOTE — CONSULT NOTE ADULT - ASSESSMENT
Chest pain  Anemia   Chest pain, atypical  Non obstructive CAD  DM  Severe Anemia      Suggest:  Atypical chest pain.  PPIs  PRBC transfusion  Continue current cardiac meds  Stable for endoscopy

## 2021-04-15 NOTE — PROGRESS NOTE ADULT - SUBJECTIVE AND OBJECTIVE BOX
CC: anemia and chest pain     HPI: The patient is a 71 yo female with Hx. of DM2, HTN, post Polio at age 5, wheel chair bound, s/p PPM,. was sent to  the ER by PCP Dr. Chávez because severe anemia detected on lab work done yesterday. The patient is also c/o intermittent chest pain not radiating. Rectal exam neg for blood done by ED MD.     REVIEW OF SYSTEMS:  All other review of systems is negative unless indicated above    Vital Signs Last 24 Hrs  T(C): 36.5 (15 Apr 2021 14:05), Max: 36.9 (14 Apr 2021 23:54)  T(F): 97.7 (15 Apr 2021 14:05), Max: 98.4 (14 Apr 2021 23:54)  HR: 82 (15 Apr 2021 14:05) (75 - 84)  BP: 122/68 (15 Apr 2021 14:05) (104/80 - 127/56)  BP(mean): 76 (14 Apr 2021 23:54) (76 - 76)  RR: 18 (15 Apr 2021 14:05) (12 - 18)  SpO2: 100% (15 Apr 2021 14:05) (97% - 100%)    I&O's Summary      CAPILLARY BLOOD GLUCOSE      POCT Blood Glucose.: 167 mg/dL (15 Apr 2021 11:53)  POCT Blood Glucose.: 106 mg/dL (15 Apr 2021 07:35)      PHYSICAL EXAM:    Constitutional: NAD, awake and alert, well-developed  HEENT: PERR, EOMI, Normal Hearing, MMM  Neck: Soft and supple, No LAD, No JVD  Respiratory: Breath sounds are clear bilaterally, No wheezing, rales or rhonchi  Cardiovascular: S1 and S2, regular rate and rhythm, no Murmurs, gallops or rubs  Gastrointestinal: Bowel Sounds present, soft, nontender, nondistended, no guarding, no rebound  Extremities: No peripheral edema  Vascular: 2+ peripheral pulses  Neurological: A/O x 3, no focal deficits  Musculoskeletal: 5/5 strength b/l upper and lower extremities  Skin: No rashes    Medications:  MEDICATIONS  (STANDING):  dextrose 40% Gel 15 Gram(s) Oral once  dextrose 5%. 1000 milliLiter(s) (50 mL/Hr) IV Continuous <Continuous>  dextrose 5%. 1000 milliLiter(s) (100 mL/Hr) IV Continuous <Continuous>  dextrose 50% Injectable 25 Gram(s) IV Push once  dextrose 50% Injectable 12.5 Gram(s) IV Push once  dextrose 50% Injectable 25 Gram(s) IV Push once  glucagon  Injectable 1 milliGRAM(s) IntraMuscular once  insulin lispro (ADMELOG) corrective regimen sliding scale   SubCutaneous three times a day before meals  iron sucrose Injectable 200 milliGRAM(s) IV Push <User Schedule>  losartan 50 milliGRAM(s) Oral daily  montelukast 10 milliGRAM(s) Oral daily  pantoprazole  Injectable 40 milliGRAM(s) IV Push daily  sertraline 100 milliGRAM(s) Oral daily  simvastatin 40 milliGRAM(s) Oral at bedtime      Labs: All Labs Reviewed:                        5.7    5.98  )-----------( 411      ( 15 Apr 2021 07:24 )             19.9     04-15    135  |  106  |  22  ----------------------------<  84  4.2   |  25  |  0.95    Ca    8.6      15 Apr 2021 07:24    TPro  8.3  /  Alb  3.9  /  TBili  0.2  /  DBili  x   /  AST  17  /  ALT  11<L>  /  AlkPhos  68  04-14    PT/INR - ( 14 Apr 2021 11:25 )   PT: 12.4 sec;   INR: 1.06 ratio         PTT - ( 14 Apr 2021 11:25 )  PTT:26.3 sec      CARDIAC MARKERS ( 15 Apr 2021 01:14 )  <0.015 ng/mL / x     / x     / x     / x      CARDIAC MARKERS ( 14 Apr 2021 23:37 )  <0.015 ng/mL / x     / x     / x     / x        RADIOLOGY/EKG: all tests were reviewed     ra< from: Xray Chest 1 View AP/PA. (04.14.21 @ 11:27) >    EXAM:  XR CHEST 1 VIEW                            PROCEDURE DATE:  04/14/2021          INTERPRETATION:  AP erect chest on April 14, 2021 at 11:22 AM. Patient has anemia.    Heart normal for projection. Left-sided pacemaker again noted. Lungs remainclear.    Chest is similar to April 20, 2018.    IMPRESSION: No acute finding or change.    < end of copied text >    DVT PPX: PAS/acute anemia     Advance Directive: full code     Disposition: transfuse 2 units PRBC

## 2021-04-15 NOTE — CONSULT NOTE ADULT - SUBJECTIVE AND OBJECTIVE BOX
HPI:  HPI: The patient is a 71 yo female with Hx. of DM2, HTN, post Polio at age 5, wheel chair bound, s/p PPM,. was sent to  the ER by PCP Dr. Chávez because severe anemia detected on lab work done yesterday. The patient is also c/o intermittent chest pain not radiating. Rectal exam neg for blood done by ED MD.     On admission Hgb 6.3  Awaiting blood transfusion.     PMHx:  DM2, HTN, post Polio osteopathy, SSS s/p PPM    PSHx: LH Cath 18 non obstr CAD, EF 55%,       Family Hx: Father  during heart valve surgery, mother  from Lung CA    Social Hx.: not smoking, no alcohol use  Lives alone. Daughters live nearby.    ROS:   ~ 30 lbs weight loss in < one year  ENT/Mouth: no changes  Cardiovascular:  intermittent chest pain  Respiratory: no SOB  Gastrointestinal: + recent constipation, no diarrhea, no nausea, no vomiting  Genitourinary: no dysuria  Breast: no pain  Musculoskeletal: no pain  Integumentary: mild generalized itching  Neurological: No Headache, no tremor,  Psychiatric: no suicidal ideations  Endocrine: no excessive thirst,   Hematologic/Lymphatic: no swollen glands    MEDICATIONS  (STANDING):  dextrose 40% Gel 15 Gram(s) Oral once  dextrose 5%. 1000 milliLiter(s) (50 mL/Hr) IV Continuous <Continuous>  dextrose 5%. 1000 milliLiter(s) (100 mL/Hr) IV Continuous <Continuous>  dextrose 50% Injectable 25 Gram(s) IV Push once  dextrose 50% Injectable 12.5 Gram(s) IV Push once  dextrose 50% Injectable 25 Gram(s) IV Push once  glucagon  Injectable 1 milliGRAM(s) IntraMuscular once  insulin lispro (ADMELOG) corrective regimen sliding scale   SubCutaneous three times a day before meals  losartan 50 milliGRAM(s) Oral daily  montelukast 10 milliGRAM(s) Oral daily  sertraline 100 milliGRAM(s) Oral daily  simvastatin 40 milliGRAM(s) Oral at bedtime    Vital Signs Last 24 Hrs  T(C): 36.9 (2021 23:54), Max: 36.9 (2021 23:54)  T(F): 98.4 (2021 23:54), Max: 98.4 (2021 23:54)  HR: 77 (2021 23:54) (77 - 94)  BP: 127/56 (2021 23:54) (112/60 - 132/66)  BP(mean): 76 (2021 23:54) (76 - 76)  RR: 17 (2021 23:54) (12 - 20)  SpO2: 100% (2021 23:54) (94% - 100%)    Exam :  pleasant AA female sitting in bed NAD  Sclera not icteric Neck no masses  Lungs clear b/l  PPM  Heart RRR  Abdomen soft NT  Extremities pulses +  no leg edema   Neuro-             Lab:                       6.2    6.84  )-----------( 410      ( 2021 11:25 )             22.1       -14    139  |  108  |  24<H>  ----------------------------<  100<H>  4.7   |  23  |  1.12    Ca    9.2      2021 11:25    TPro  8.3  /  Alb  3.9  /  TBili  0.2  /  DBili  x   /  AST  17  /  ALT  11<L>  /  AlkPhos  68  -      Iron 10,    HPI:  HPI: The patient is a 71 yo female with Hx. of DM2, HTN, post Polio at age 5, wheel chair bound, s/p PPM,. was sent to  the ER by PCP Dr. Chávez because severe anemia detected on lab work done yesterday. The patient is also c/o intermittent chest pain not radiating. Rectal exam neg for blood done by ED MD.     On admission Hgb 6.3  Awaiting blood transfusion.     PMHx:  DM2, HTN, post Polio osteopathy, SSS s/p PPM    PSHx: LH Cath 18 non obstr CAD, EF 55%,       Family Hx: Father  during heart valve surgery, mother  from Lung CA    Social Hx.: not smoking, no alcohol use  Lives alone. Daughters live nearby.    ROS:   ~ 30 lbs weight loss in < one year  ENT/Mouth: no changes  Cardiovascular:  intermittent chest pain  Respiratory: no SOB  Gastrointestinal: + recent constipation, no diarrhea, no nausea, no vomiting  Genitourinary: no dysuria  Breast: no pain  Musculoskeletal: no pain  Integumentary: mild generalized itching  Neurological: No Headache, no tremor,  Psychiatric: no suicidal ideations  Endocrine: no excessive thirst,   Hematologic/Lymphatic: no swollen glands    MEDICATIONS  (STANDING):  dextrose 40% Gel 15 Gram(s) Oral once  dextrose 5%. 1000 milliLiter(s) (50 mL/Hr) IV Continuous <Continuous>  dextrose 5%. 1000 milliLiter(s) (100 mL/Hr) IV Continuous <Continuous>  dextrose 50% Injectable 25 Gram(s) IV Push once  dextrose 50% Injectable 12.5 Gram(s) IV Push once  dextrose 50% Injectable 25 Gram(s) IV Push once  glucagon  Injectable 1 milliGRAM(s) IntraMuscular once  insulin lispro (ADMELOG) corrective regimen sliding scale   SubCutaneous three times a day before meals  losartan 50 milliGRAM(s) Oral daily  montelukast 10 milliGRAM(s) Oral daily  sertraline 100 milliGRAM(s) Oral daily  simvastatin 40 milliGRAM(s) Oral at bedtime    Vital Signs Last 24 Hrs  T(C): 36.9 (2021 23:54), Max: 36.9 (2021 23:54)  T(F): 98.4 (2021 23:54), Max: 98.4 (2021 23:54)  HR: 77 (2021 23:54) (77 - 94)  BP: 127/56 (2021 23:54) (112/60 - 132/66)  BP(mean): 76 (2021 23:54) (76 - 76)  RR: 17 (2021 23:54) (12 - 20)  SpO2: 100% (2021 23:54) (94% - 100%)    Exam :  pleasant AA female sitting in bed NAD  Sclera not icteric Neck no masses  Lungs clear b/l  PPM  Heart RRR  Abdomen soft NT  Extremities pulses +  no leg edema   Skin no rashes   Musculoskeletal  muscle atrophy due to polio   Psych pleasant  cooperative            Lab:                       6.2    6.84  )-----------( 410      ( 2021 11:25 )             22.1       04-14    139  |  108  |  24<H>  ----------------------------<  100<H>  4.7   |  23  |  1.12    Ca    9.2      2021 11:25    TPro  8.3  /  Alb  3.9  /  TBili  0.2  /  DBili  x   /  AST  17  /  ALT  11<L>  /  AlkPhos  68  04-14      Iron 10,     Ferritin 9  iron sat 2 %

## 2021-04-15 NOTE — CHART NOTE - NSCHARTNOTEFT_GEN_A_CORE
Full note to follow. For EGD/Colon tomorrow. Prep ordered (movi prep x 2). NPO at midnight. Covid neg.

## 2021-04-15 NOTE — PROCEDURE NOTE - INTERROGATION NOTE: COMMENTS
180.34 Normal functioning dual chamber PPM, battery longevity 5.5years. Stored data : 1 PMT, Recorded as brief AF: FFRW, no AFib or other arrhythmia recorded.

## 2021-04-16 ENCOUNTER — RESULT REVIEW (OUTPATIENT)
Age: 71
End: 2021-04-16

## 2021-04-16 DIAGNOSIS — Z90.49 ACQUIRED ABSENCE OF OTHER SPECIFIED PARTS OF DIGESTIVE TRACT: Chronic | ICD-10-CM

## 2021-04-16 LAB
ALBUMIN SERPL ELPH-MCNC: 3.4 G/DL — SIGNIFICANT CHANGE UP (ref 3.3–5)
ALP SERPL-CCNC: 81 U/L — SIGNIFICANT CHANGE UP (ref 40–120)
ALT FLD-CCNC: 11 U/L — LOW (ref 12–78)
ANION GAP SERPL CALC-SCNC: 5 MMOL/L — SIGNIFICANT CHANGE UP (ref 5–17)
AST SERPL-CCNC: 15 U/L — SIGNIFICANT CHANGE UP (ref 15–37)
BASOPHILS # BLD AUTO: 0.04 K/UL — SIGNIFICANT CHANGE UP (ref 0–0.2)
BASOPHILS NFR BLD AUTO: 0.5 % — SIGNIFICANT CHANGE UP (ref 0–2)
BILIRUB SERPL-MCNC: 0.6 MG/DL — SIGNIFICANT CHANGE UP (ref 0.2–1.2)
BUN SERPL-MCNC: 20 MG/DL — SIGNIFICANT CHANGE UP (ref 7–23)
CALCIUM SERPL-MCNC: 8.9 MG/DL — SIGNIFICANT CHANGE UP (ref 8.5–10.1)
CHLORIDE SERPL-SCNC: 112 MMOL/L — HIGH (ref 96–108)
CO2 SERPL-SCNC: 22 MMOL/L — SIGNIFICANT CHANGE UP (ref 22–31)
CREAT SERPL-MCNC: 0.81 MG/DL — SIGNIFICANT CHANGE UP (ref 0.5–1.3)
EOSINOPHIL # BLD AUTO: 0.17 K/UL — SIGNIFICANT CHANGE UP (ref 0–0.5)
EOSINOPHIL NFR BLD AUTO: 2.1 % — SIGNIFICANT CHANGE UP (ref 0–6)
GLUCOSE SERPL-MCNC: 90 MG/DL — SIGNIFICANT CHANGE UP (ref 70–99)
HCT VFR BLD CALC: 26.1 % — LOW (ref 34.5–45)
HCT VFR BLD CALC: 26.8 % — LOW (ref 34.5–45)
HGB BLD-MCNC: 7.9 G/DL — LOW (ref 11.5–15.5)
HGB BLD-MCNC: 8.1 G/DL — LOW (ref 11.5–15.5)
IMM GRANULOCYTES NFR BLD AUTO: 0.4 % — SIGNIFICANT CHANGE UP (ref 0–1.5)
LYMPHOCYTES # BLD AUTO: 1.59 K/UL — SIGNIFICANT CHANGE UP (ref 1–3.3)
LYMPHOCYTES # BLD AUTO: 19.6 % — SIGNIFICANT CHANGE UP (ref 13–44)
MCHC RBC-ENTMCNC: 22.1 PG — LOW (ref 27–34)
MCHC RBC-ENTMCNC: 30.2 GM/DL — LOW (ref 32–36)
MCV RBC AUTO: 73.2 FL — LOW (ref 80–100)
MONOCYTES # BLD AUTO: 0.69 K/UL — SIGNIFICANT CHANGE UP (ref 0–0.9)
MONOCYTES NFR BLD AUTO: 8.5 % — SIGNIFICANT CHANGE UP (ref 2–14)
NEUTROPHILS # BLD AUTO: 5.6 K/UL — SIGNIFICANT CHANGE UP (ref 1.8–7.4)
NEUTROPHILS NFR BLD AUTO: 68.9 % — SIGNIFICANT CHANGE UP (ref 43–77)
PLATELET # BLD AUTO: 368 K/UL — SIGNIFICANT CHANGE UP (ref 150–400)
POTASSIUM SERPL-MCNC: 4.7 MMOL/L — SIGNIFICANT CHANGE UP (ref 3.5–5.3)
POTASSIUM SERPL-SCNC: 4.7 MMOL/L — SIGNIFICANT CHANGE UP (ref 3.5–5.3)
PROT SERPL-MCNC: 7.6 GM/DL — SIGNIFICANT CHANGE UP (ref 6–8.3)
RBC # BLD: 3.66 M/UL — LOW (ref 3.8–5.2)
RBC # FLD: 19.4 % — HIGH (ref 10.3–14.5)
SODIUM SERPL-SCNC: 139 MMOL/L — SIGNIFICANT CHANGE UP (ref 135–145)
WBC # BLD: 8.12 K/UL — SIGNIFICANT CHANGE UP (ref 3.8–10.5)
WBC # FLD AUTO: 8.12 K/UL — SIGNIFICANT CHANGE UP (ref 3.8–10.5)

## 2021-04-16 PROCEDURE — 43239 EGD BIOPSY SINGLE/MULTIPLE: CPT

## 2021-04-16 PROCEDURE — 93306 TTE W/DOPPLER COMPLETE: CPT | Mod: 26

## 2021-04-16 PROCEDURE — 88305 TISSUE EXAM BY PATHOLOGIST: CPT | Mod: 26

## 2021-04-16 PROCEDURE — 45378 DIAGNOSTIC COLONOSCOPY: CPT

## 2021-04-16 PROCEDURE — 99231 SBSQ HOSP IP/OBS SF/LOW 25: CPT

## 2021-04-16 PROCEDURE — 99232 SBSQ HOSP IP/OBS MODERATE 35: CPT

## 2021-04-16 PROCEDURE — 43255 EGD CONTROL BLEEDING ANY: CPT | Mod: 59

## 2021-04-16 PROCEDURE — 88312 SPECIAL STAINS GROUP 1: CPT | Mod: 26

## 2021-04-16 PROCEDURE — 99222 1ST HOSP IP/OBS MODERATE 55: CPT | Mod: 25

## 2021-04-16 RX ORDER — ONDANSETRON 8 MG/1
4 TABLET, FILM COATED ORAL ONCE
Refills: 0 | Status: DISCONTINUED | OUTPATIENT
Start: 2021-04-16 | End: 2021-04-16

## 2021-04-16 RX ORDER — SODIUM CHLORIDE 9 MG/ML
1000 INJECTION, SOLUTION INTRAVENOUS
Refills: 0 | Status: DISCONTINUED | OUTPATIENT
Start: 2021-04-16 | End: 2021-04-16

## 2021-04-16 RX ADMIN — Medication 650 MILLIGRAM(S): at 21:13

## 2021-04-16 RX ADMIN — PANTOPRAZOLE SODIUM 40 MILLIGRAM(S): 20 TABLET, DELAYED RELEASE ORAL at 17:45

## 2021-04-16 RX ADMIN — IRON SUCROSE 200 MILLIGRAM(S): 20 INJECTION, SOLUTION INTRAVENOUS at 15:13

## 2021-04-16 RX ADMIN — MONTELUKAST 10 MILLIGRAM(S): 4 TABLET, CHEWABLE ORAL at 16:46

## 2021-04-16 RX ADMIN — Medication 650 MILLIGRAM(S): at 22:00

## 2021-04-16 RX ADMIN — LOSARTAN POTASSIUM 50 MILLIGRAM(S): 100 TABLET, FILM COATED ORAL at 16:46

## 2021-04-16 RX ADMIN — SIMVASTATIN 40 MILLIGRAM(S): 20 TABLET, FILM COATED ORAL at 21:13

## 2021-04-16 NOTE — CONSULT NOTE ADULT - SUBJECTIVE AND OBJECTIVE BOX
Patient is a 70y old  Female who presents with a chief complaint of Anemia , Chest pain (16 Apr 2021 13:47)    70 year old woman with DM, HTN, wheelchair bound due to polio, pacemaker, sent in for symptomatic anemia.     Patient states she has felt very weak over the past few weeks and began to feel short of breath and have chest pain. She went to her PMD who sent labs and was referred to ED after hgb value of 6. She also reported 30 pound weight loss. No overt signs of GI bleeding, no nausea vomiting. On imaging without intra-abdominal process or lympadenopathy.     Went for colonscopy/endoscopy today and was found to have small bowel AVM. Colon without good prep but no mass lesions seen.       PAST MEDICAL & SURGICAL HISTORY:  DM (diabetes mellitus)    HTN (hypertension)    Polio    Pacemaker    Asthma    History of cholecystectomy        MEDICATIONS  (STANDING):  dextrose 40% Gel 15 Gram(s) Oral once  dextrose 5%. 1000 milliLiter(s) (50 mL/Hr) IV Continuous <Continuous>  dextrose 5%. 1000 milliLiter(s) (100 mL/Hr) IV Continuous <Continuous>  dextrose 50% Injectable 25 Gram(s) IV Push once  dextrose 50% Injectable 12.5 Gram(s) IV Push once  dextrose 50% Injectable 25 Gram(s) IV Push once  glucagon  Injectable 1 milliGRAM(s) IntraMuscular once  insulin lispro (ADMELOG) corrective regimen sliding scale   SubCutaneous three times a day before meals  iron sucrose Injectable 200 milliGRAM(s) IV Push <User Schedule>  losartan 50 milliGRAM(s) Oral daily  montelukast 10 milliGRAM(s) Oral daily  pantoprazole  Injectable 40 milliGRAM(s) IV Push daily  sertraline 100 milliGRAM(s) Oral daily  simvastatin 40 milliGRAM(s) Oral at bedtime    MEDICATIONS  (PRN):  acetaminophen   Tablet .. 650 milliGRAM(s) Oral every 6 hours PRN Temp greater or equal to 38C (100.4F), Mild Pain (1 - 3)  ondansetron Injectable 4 milliGRAM(s) IV Push every 8 hours PRN Nausea and/or Vomiting      Allergies    morphine (Unknown)  penicillin (Unknown)  sulfa drugs (Unknown)    Intolerances        SOCIAL HISTORY: no smoking, drinking or drugs    FAMILY HISTORY:  Family history of early CAD (Child)        REVIEW OF SYSTEMS:  Otherwise negative    Vital Signs Last 24 Hrs  T(C): 36.6 (16 Apr 2021 16:08), Max: 37 (15 Apr 2021 21:19)  T(F): 97.8 (16 Apr 2021 16:08), Max: 98.6 (15 Apr 2021 21:19)  HR: 72 (16 Apr 2021 16:08) (72 - 99)  BP: 116/58 (16 Apr 2021 16:08) (116/58 - 140/78)  BP(mean): --  RR: 15 (16 Apr 2021 10:45) (14 - 18)  SpO2: 98% (16 Apr 2021 10:45) (97% - 100%)    PHYSICAL EXAM:  Gen: pleasant/happy, obese  Abd; distended per body habitus, soft    LABS:                        8.1    8.12  )-----------( 368      ( 16 Apr 2021 08:33 )             26.8     04-16    139  |  112<H>  |  20  ----------------------------<  90  4.7   |  22  |  0.81    Ca    8.9      16 Apr 2021 08:33    TPro  7.6  /  Alb  3.4  /  TBili  0.6  /  DBili  x   /  AST  15  /  ALT  11<L>  /  AlkPhos  81  04-16      LIVER FUNCTIONS - ( 16 Apr 2021 08:33 )  Alb: 3.4 g/dL / Pro: 7.6 gm/dL / ALK PHOS: 81 U/L / ALT: 11 U/L / AST: 15 U/L / GGT: x             RADIOLOGY & ADDITIONAL STUDIES: CT without acute intrabdominal process

## 2021-04-16 NOTE — PROGRESS NOTE ADULT - SUBJECTIVE AND OBJECTIVE BOX
CURRENT CARDIAC WORKUP:       Echo:  Stress Test:  Cardiac Cath:    Allergies:   morphine (Unknown)  penicillin (Unknown)  sulfa drugs (Unknown)      MEDICATIONS  (STANDING):  dextrose 40% Gel 15 Gram(s) Oral once  dextrose 5%. 1000 milliLiter(s) (50 mL/Hr) IV Continuous <Continuous>  dextrose 5%. 1000 milliLiter(s) (100 mL/Hr) IV Continuous <Continuous>  dextrose 50% Injectable 25 Gram(s) IV Push once  dextrose 50% Injectable 12.5 Gram(s) IV Push once  dextrose 50% Injectable 25 Gram(s) IV Push once  glucagon  Injectable 1 milliGRAM(s) IntraMuscular once  insulin lispro (ADMELOG) corrective regimen sliding scale   SubCutaneous three times a day before meals  iron sucrose Injectable 200 milliGRAM(s) IV Push <User Schedule>  lactated ringers. 1000 milliLiter(s) (75 mL/Hr) IV Continuous <Continuous>  losartan 50 milliGRAM(s) Oral daily  montelukast 10 milliGRAM(s) Oral daily  pantoprazole  Injectable 40 milliGRAM(s) IV Push daily  polyethylene glycol/electrolyte Solution 2000 milliLiter(s) Oral once  sertraline 100 milliGRAM(s) Oral daily  simvastatin 40 milliGRAM(s) Oral at bedtime    MEDICATIONS  (PRN):  acetaminophen   Tablet .. 650 milliGRAM(s) Oral every 6 hours PRN Temp greater or equal to 38C (100.4F), Mild Pain (1 - 3)  ondansetron Injectable 4 milliGRAM(s) IV Push every 8 hours PRN Nausea and/or Vomiting  ondansetron Injectable 4 milliGRAM(s) IV Push once PRN Nausea and/or Vomiting      ROS:     .ros      Vital Signs Last 24 Hrs  T(C): 36.3 (16 Apr 2021 10:45), Max: 37 (15 Apr 2021 21:19)  T(F): 97.3 (16 Apr 2021 10:45), Max: 98.6 (15 Apr 2021 21:19)  HR: 77 (16 Apr 2021 10:45) (73 - 99)  BP: 137/71 (16 Apr 2021 10:45) (122/68 - 140/78)  BP(mean): --  RR: 15 (16 Apr 2021 10:45) (14 - 18)  SpO2: 98% (16 Apr 2021 10:45) (97% - 100%)    I&O's Summary    15 Apr 2021 07:01  -  16 Apr 2021 07:00  --------------------------------------------------------  IN: 287 mL / OUT: 900 mL / NET: -613 mL    16 Apr 2021 07:01  -  16 Apr 2021 13:11  --------------------------------------------------------  IN: 400 mL / OUT: 0 mL / NET: 400 mL        PHYSICAL EXAM:    .phy      TELEMETRY:    ECG:    LABS:                        8.1    8.12  )-----------( 368      ( 16 Apr 2021 08:33 )             26.8     04-16    139  |  112<H>  |  20  ----------------------------<  90  4.7   |  22  |  0.81    Ca    8.9      16 Apr 2021 08:33    TPro  7.6  /  Alb  3.4  /  TBili  0.6  /  DBili  x   /  AST  15  /  ALT  11<L>  /  AlkPhos  81  04-16          04-15 @ 01:14  Trop-I <0.015  CK     --  CK MB  --    04-14 @ 23:37  Trop-I <0.015  CK     --  CK MB  --                RADIOLOGY & ADDITIONAL STUDIES:     11 yo female with Hx. of DM2, HTN, post Polio at age 5, wheel chair bound, s/p PPM,. She was sent to  the ER by PCP Dr. Chávez because severe anemia detected on lab work. The patient is also c/o intermittent chest pain not radiating. Constant discomfort.  Non radiating. +ve shortness of breath.  Rectal exam neg for blood done by ED MD.     Today, no new events. No chest pain    PAST MEDICAL & SURGICAL HISTORY:  DM (diabetes mellitus)  HTN (hypertension)  Polio  Pacemaker  Asthma    CURRENT CARDIAC WORKUP:       TTE Echo Complete w/o Contrast w/ Doppler (04.16.21 @ 11:10) >   The aortic valve is not well visualized, appears sclerotic. Valve opening seems to be normal.   Normal appearing left atrium.   Left ventricle systolic function appears preserved based on difficult transthoracic views; segmental wall motion abnormalities can not be rule out. Visual estimation of left ventricle ejection fraction is 50-55% .   The IVC appears normal.   Fibrocalcific changes noted to the mitral valve leaflets with preserved leaflet excursion.   Trace mitral regurgitation is present.   Mild mitral annular calcification is present.   EA reversal of the mitral inflow consistent with reduced compliance of the left ventricle.   No evidence of pericardial effusion.   No evidence of pleural effusion.   Pulmonic valve not well seen, probably normal pulmonic valve function.   Normal appearing right atrium.   A device wire is seen in the RV and RA.   Trace tricuspid valve regurgitation is present.    Cardiac Cath Lab - Adult (04.23.18 @ 10:57) >   Diagnostic Conclusions   Non obstructive coronary artery disease.   Normal LV systolicfunction. Estimated LV ejection fraction is 55 %.   No aortic valve stenosis.      Allergies:   morphine (Unknown)  penicillin (Unknown)  sulfa drugs (Unknown)      MEDICATIONS  (STANDING):  dextrose 40% Gel 15 Gram(s) Oral once  dextrose 5%. 1000 milliLiter(s) (50 mL/Hr) IV Continuous <Continuous>  dextrose 5%. 1000 milliLiter(s) (100 mL/Hr) IV Continuous <Continuous>  dextrose 50% Injectable 25 Gram(s) IV Push once  dextrose 50% Injectable 12.5 Gram(s) IV Push once  dextrose 50% Injectable 25 Gram(s) IV Push once  glucagon  Injectable 1 milliGRAM(s) IntraMuscular once  insulin lispro (ADMELOG) corrective regimen sliding scale   SubCutaneous three times a day before meals  iron sucrose Injectable 200 milliGRAM(s) IV Push <User Schedule>  lactated ringers. 1000 milliLiter(s) (75 mL/Hr) IV Continuous <Continuous>  losartan 50 milliGRAM(s) Oral daily  montelukast 10 milliGRAM(s) Oral daily  pantoprazole  Injectable 40 milliGRAM(s) IV Push daily  polyethylene glycol/electrolyte Solution 2000 milliLiter(s) Oral once  sertraline 100 milliGRAM(s) Oral daily  simvastatin 40 milliGRAM(s) Oral at bedtime    MEDICATIONS  (PRN):  acetaminophen   Tablet .. 650 milliGRAM(s) Oral every 6 hours PRN Temp greater or equal to 38C (100.4F), Mild Pain (1 - 3)  ondansetron Injectable 4 milliGRAM(s) IV Push every 8 hours PRN Nausea and/or Vomiting  ondansetron Injectable 4 milliGRAM(s) IV Push once PRN Nausea and/or Vomiting      ROS:     Detailed ten system ROS was performed and was negative except for history as eluded to above.    no fever  no chills  no nausea  no vomiting  no diarrhea  no constipation  no melena  no hematochezia  no chest pain  no palpitations  no sob at rest  no dyspnea on exertion  no cough  no wheezing  no anorexia  no headache  no dizziness  no syncope  no weakness  no myalgia  no dysuria  no polyuria  no hematuria         Vital Signs Last 24 Hrs  T(C): 36.3 (16 Apr 2021 10:45), Max: 37 (15 Apr 2021 21:19)  T(F): 97.3 (16 Apr 2021 10:45), Max: 98.6 (15 Apr 2021 21:19)  HR: 77 (16 Apr 2021 10:45) (73 - 99)  BP: 137/71 (16 Apr 2021 10:45) (122/68 - 140/78)  RR: 15 (16 Apr 2021 10:45) (14 - 18)  SpO2: 98% (16 Apr 2021 10:45) (97% - 100%)    I&O's Summary    15 Apr 2021 07:01  -  16 Apr 2021 07:00  --------------------------------------------------------  IN: 287 mL / OUT: 900 mL / NET: -613 mL    16 Apr 2021 07:01  -  16 Apr 2021 13:11  --------------------------------------------------------  IN: 400 mL / OUT: 0 mL / NET: 400 mL        PHYSICAL EXAM:    General:                Comfortable, AAO X 3, in no distress. Pallor. Obese.   HEENT:                  Atraumatic, PERRLA, EOMI, conjunctiva clear.    Neck:                     Supple, no adenopathy, no thyromegaly, no JVD, no bruit.  Back:                     Symmetric, non tender.  Chest:                    Clear, B/L symmetric air entry, no tachypnea  Heart:                     S1, S2 normal, no gallop, no murmur.  Abdomen:              Soft, non-tender, bowel sounds active. No palpable masses.  Extremities:           no cyanosis, no edema. Peripheral pulses normal.  Skin:                      Skin color, texture normal. No rashes.  Neurologic:            Grossly nonfocal.       TELEMETRY:   Normal sinus rhythm, V paced.      ECG:    A sensed, V paced      LABS:                        8.1    8.12  )-----------( 368      ( 16 Apr 2021 08:33 )             26.8     04-16    139  |  112<H>  |  20  ----------------------------<  90  4.7   |  22  |  0.81    Ca    8.9      16 Apr 2021 08:33    TPro  7.6  /  Alb  3.4  /  TBili  0.6  /  DBili  x   /  AST  15  /  ALT  11<L>  /  AlkPhos  81  04-16      04-15 @ 01:14  Trop-I <0.015      04-14 @ 23:37  Trop-I <0.015      RADIOLOGY & ADDITIONAL STUDIES:    CT Chest, Abdomen and Pelvis w/ Oral Cont and w/ IV Cont (04.15.21 @ 20:37) >  FINDINGS:  CHEST:  LUNGS AND LARGE AIRWAYS: Patent central airways. Tiny nodule at the level of the left major fissure is unchanged from the prior study.  PLEURA: No pleural effusion.  VESSELS: There is atherosclerotic calcification of the aorta.  HEART: Heart size is normal. No pericardial effusion. Pacemaker enters from the left  MEDIASTINUM AND MARI: No lymphadenopathy.  CHEST WALL AND LOWER NECK: Within normal limits.    ABDOMEN AND PELVIS:  LIVER: Within normal limits.  BILE DUCTS: Normal caliber.  GALLBLADDER: Cholecystectomy.  SPLEEN: Within normal limits.  PANCREAS: Within normal limits.  ADRENALS: Within normal limits.  KIDNEYS/URETERS: Mild scarring upper pole left kidney is similar to the prior study. There is no hydronephrosis or urolithiasis.    BLADDER: Mild bladder wall thickening.  REPRODUCTIVE ORGANS: Hysterectomy.    BOWEL: No bowel obstruction. Appendix is normal.  PERITONEUM: No ascites.  VESSELS: Atherosclerotic changes.  RETROPERITONEUM/LYMPH NODES: No lymphadenopathy.  ABDOMINAL WALL: Within normal limits.  BONES: Degenerative changes.. There is extensive spondylosis. Atrophy of the anterior pelvic musculature and atrophy of the paraspinal musculature. There is a partially visualized jenifer and screws in the right femoral shaft.    IMPRESSION:  No acute pathology  Pacemaker  Paraspinal and pelvic muscular atrophy  Cholecystectomy

## 2021-04-16 NOTE — PROGRESS NOTE ADULT - SUBJECTIVE AND OBJECTIVE BOX
HPI:  HPI: The patient is a 69 yo female with Hx. of DM2, HTN, post Polio at age 5, wheel chair bound, s/p PPM,. was sent to  the ER by PCP Dr. Chávez because severe anemia detected on lab work done yesterday. The patient is also c/o intermittent chest pain not radiating. Rectal exam neg for blood done by ED MD.     4/15/21 On admission Hgb 6.3  Awaiting blood transfusion.   21  Stable. Tolerated iv iron OK Awaiting EGD/colonoscopy.      PMHx:  DM2, HTN, post Polio osteopathy, SSS s/p PPM    PSHx: LH Cath 18 non obstr CAD, EF 55%,       Family Hx: Father  during heart valve surgery, mother  from Lung CA    Social Hx.: not smoking, no alcohol use  Lives alone. Daughters live nearby.    ROS:   ~ 30 lbs weight loss in < one year  ENT/Mouth: no changes  Cardiovascular:  intermittent chest pain  Respiratory: no SOB  Gastrointestinal: + recent constipation, no diarrhea, no nausea, no vomiting  Genitourinary: no dysuria  Breast: no pain  Musculoskeletal: no pain  Integumentary: mild generalized itching  Neurological: No Headache, no tremor,  Psychiatric: no suicidal ideations  Endocrine: no excessive thirst,   Hematologic/Lymphatic: no swollen glands    MEDICATIONS  (STANDING):  dextrose 40% Gel 15 Gram(s) Oral once  dextrose 5%. 1000 milliLiter(s) (50 mL/Hr) IV Continuous <Continuous>  dextrose 5%. 1000 milliLiter(s) (100 mL/Hr) IV Continuous <Continuous>  dextrose 50% Injectable 25 Gram(s) IV Push once  dextrose 50% Injectable 12.5 Gram(s) IV Push once  dextrose 50% Injectable 25 Gram(s) IV Push once  glucagon  Injectable 1 milliGRAM(s) IntraMuscular once  insulin lispro (ADMELOG) corrective regimen sliding scale   SubCutaneous three times a day before meals  iron sucrose Injectable 200 milliGRAM(s) IV Push <User Schedule>  losartan 50 milliGRAM(s) Oral daily  montelukast 10 milliGRAM(s) Oral daily  pantoprazole  Injectable 40 milliGRAM(s) IV Push daily  polyethylene glycol/electrolyte Solution 2000 milliLiter(s) Oral once  sertraline 100 milliGRAM(s) Oral daily  simvastatin 40 milliGRAM(s) Oral at bedtime    MEDICATIONS  (PRN):  acetaminophen   Tablet .. 650 milliGRAM(s) Oral every 6 hours PRN Temp greater or equal to 38C (100.4F), Mild Pain (1 - 3)  ondansetron Injectable 4 milliGRAM(s) IV Push every 8 hours PRN Nausea and/or Vomiting    Vital Signs Last 24 Hrs  T(C): 36.5 (2021 08:36), Max: 37 (15 Apr 2021 21:19)  T(F): 97.7 (2021 08:36), Max: 98.6 (15 Apr 2021 21:19)  HR: 73 (2021 08:36) (73 - 82)  BP: 137/69 (2021 08:36) (122/68 - 140/78)  BP(mean): --  RR: 18 (2021 08:36) (18 - 18)  SpO2: 98% (2021 08:36) (98% - 100%)    Exam :  pleasant AA female sitting in bed NAD  Sclera not icteric Neck no masses  Lungs clear b/l  PPM  Heart RRR  Abdomen soft NT  Extremities pulses +  no leg edema   Skin no rashes   Musculoskeletal  muscle atrophy due to polio   Psych pleasant  cooperative            Lab:                       6.2    6.84  )-----------( 410      ( 2021 11:25 )             22.1       04-14                          7.9    x     )-----------( x        ( 15 Apr 2021 23:44 )             26.1       139  |  108  |  24<H>  ----------------------------<  100<H>  4.7   |  23  |  1.12    Ca    9.2      2021 11:25    TPro  8.3  /  Alb  3.9  /  TBili  0.2  /  DBili  x   /  AST  17  /  ALT  11<L>  /  AlkPhos  68  04-14      Iron 10,     Ferritin 9  iron sat 2 %

## 2021-04-16 NOTE — PROGRESS NOTE ADULT - ASSESSMENT
69 yo female with Hx. of DM2, HTN, post Polio at age 5, wheel chair bound, s/p PPM,, with 30 lbs weight loss in one year, was sent to  the ER by PCP Dr. Chávez  because of severe anemia detected on lab work done yesterday. The patient is also c/o intermittent chest pain not radiating. Rectal exam neg for blood done by ED.    # Acute iron deficient anemia   H dropped from 6.2 on admission to 5.7   Rectal exam neg for blood done by ED  symptomatic - feels weak   concern for malignancy - lost 39 lbs in one year   - sp  2 units prbc - H increased to 8.1  - CT of chest, abdomen and pelvis - no malignancy, wnl   - s/p EGD/colonoscopy on 4/16 with Dr. 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 iron infusion - last dose tomorrow - then she can try iron po daily  - cannot have iron tid as she did not tolerate in the past and will follow up in 2 weeks with Dr. Nobles - D/W Dr. Nobles   - CBC in AM   - f/u CEA results outpatient with Dr. Nobles  - follow up with GI Dr. Rangel in 1-2 weeks      # Chest pain   - resolved, in the setting of acute anemia  - trops neg x 2  - pt. with PPM - interrogated by EP - normal functioning, no Afib or arrhythmia   - TTE done - f/u results   - Cardiology consult appreciated - cardiac status is stable     # HTN  - controlled  - cont. losartan    # Post polio osteopathy   wheechair bound   PT

## 2021-04-16 NOTE — PROGRESS NOTE ADULT - SUBJECTIVE AND OBJECTIVE BOX
CC: anemia and chest pain     HPI: The patient is a 69 yo female with Hx. of DM2, HTN, post Polio at age 5, wheel chair bound, s/p PPM,. was sent to  the ER by PCP Dr. Chávez because severe anemia detected on lab work done yesterday. The patient is also c/o intermittent chest pain not radiating. Rectal exam neg for blood done by ED MD.     REVIEW OF SYSTEMS:  All other review of systems is negative unless indicated above    Vital Signs Last 24 Hrs  T(C): 36.3 (16 Apr 2021 10:45), Max: 37 (15 Apr 2021 21:19)  T(F): 97.3 (16 Apr 2021 10:45), Max: 98.6 (15 Apr 2021 21:19)  HR: 77 (16 Apr 2021 10:45) (73 - 99)  BP: 137/71 (16 Apr 2021 10:45) (122/68 - 140/78)  BP(mean): --  RR: 15 (16 Apr 2021 10:45) (14 - 18)  SpO2: 98% (16 Apr 2021 10:45) (97% - 100%)    I&O's Summary    15 Apr 2021 07:01  -  16 Apr 2021 07:00  --------------------------------------------------------  IN: 287 mL / OUT: 900 mL / NET: -613 mL    16 Apr 2021 07:01  -  16 Apr 2021 13:47  --------------------------------------------------------  IN: 400 mL / OUT: 0 mL / NET: 400 mL        CAPILLARY BLOOD GLUCOSE      POCT Blood Glucose.: 132 mg/dL (16 Apr 2021 06:16)  POCT Blood Glucose.: 153 mg/dL (15 Apr 2021 21:46)  POCT Blood Glucose.: 110 mg/dL (15 Apr 2021 17:27)      PHYSICAL EXAM:    Constitutional: NAD, awake and alert, well-developed  HEENT: PERR, EOMI, Normal Hearing, MMM  Neck: Soft and supple, No LAD, No JVD  Respiratory: Breath sounds are clear bilaterally, No wheezing, rales or rhonchi  Cardiovascular: S1 and S2, regular rate and rhythm, no Murmurs, gallops or rubs  Gastrointestinal: Bowel Sounds present, soft, nontender, nondistended, no guarding, no rebound  Extremities: No peripheral edema  Vascular: 2+ peripheral pulses  Neurological: A/O x 3, no focal deficits  Musculoskeletal: 5/5 strength b/l upper and lower extremities  Skin: No rashes    Medications:  MEDICATIONS  (STANDING):  dextrose 40% Gel 15 Gram(s) Oral once  dextrose 5%. 1000 milliLiter(s) (50 mL/Hr) IV Continuous <Continuous>  dextrose 5%. 1000 milliLiter(s) (100 mL/Hr) IV Continuous <Continuous>  dextrose 50% Injectable 25 Gram(s) IV Push once  dextrose 50% Injectable 12.5 Gram(s) IV Push once  dextrose 50% Injectable 25 Gram(s) IV Push once  glucagon  Injectable 1 milliGRAM(s) IntraMuscular once  insulin lispro (ADMELOG) corrective regimen sliding scale   SubCutaneous three times a day before meals  iron sucrose Injectable 200 milliGRAM(s) IV Push <User Schedule>  lactated ringers. 1000 milliLiter(s) (75 mL/Hr) IV Continuous <Continuous>  losartan 50 milliGRAM(s) Oral daily  montelukast 10 milliGRAM(s) Oral daily  pantoprazole  Injectable 40 milliGRAM(s) IV Push daily  polyethylene glycol/electrolyte Solution 2000 milliLiter(s) Oral once  sertraline 100 milliGRAM(s) Oral daily  simvastatin 40 milliGRAM(s) Oral at bedtime      Labs: All Labs Reviewed:                        8.1    8.12  )-----------( 368      ( 16 Apr 2021 08:33 )             26.8     04-16    139  |  112<H>  |  20  ----------------------------<  90  4.7   |  22  |  0.81    Ca    8.9      16 Apr 2021 08:33    TPro  7.6  /  Alb  3.4  /  TBili  0.6  /  DBili  x   /  AST  15  /  ALT  11<L>  /  AlkPhos  81  04-16          CARDIAC MARKERS ( 15 Apr 2021 01:14 )  <0.015 ng/mL / x     / x     / x     / x      CARDIAC MARKERS ( 14 Apr 2021 23:37 )  <0.015 ng/mL / x     / x     / x     / x          RADIOLOGY/EKG: All tests were reviewed   < from: CT Abdomen and Pelvis w/ Oral Cont and w/ IV Cont (04.15.21 @ 20:37) >    EXAM:  CT ABDOMEN AND PELVIS OC IC                          EXAM:  CT CHEST IC                            PROCEDURE DATE:  04/15/2021          INTERPRETATION:  CLINICAL INFORMATION: Weight loss and anemia and shortness of breath    COMPARISON: 4/18/2018.    CONTRAST/COMPLICATIONS:  IV Contrast: Omnipaque 350  90 cc administered   10 cc discarded  Oral Contrast: Omnipaque 300 + Fruit 2o  Complications: None reported at time of study completion    PROCEDURE:  CT of the Chest, Abdomen and Pelvis was performed.  Sagittal and coronal reformats were performed.    FINDINGS:  CHEST:  LUNGS AND LARGE AIRWAYS: Patent central airways. Tiny nodule at the level of the left major fissure is unchanged from the prior study.  PLEURA: No pleural effusion.  VESSELS: There is atherosclerotic calcification of the aorta.  HEART: Heart size is normal. No pericardial effusion. Pacemaker enters from the left  MEDIASTINUM AND MARI: No lymphadenopathy.  CHEST WALL AND LOWER NECK: Within normal limits.    ABDOMEN AND PELVIS:  LIVER: Within normal limits.  BILE DUCTS: Normal caliber.  GALLBLADDER: Cholecystectomy.  SPLEEN: Within normal limits.  PANCREAS: Within normal limits.  ADRENALS: Within normal limits.  KIDNEYS/URETERS: Mild scarring upper pole left kidney is similar to the prior study. There is no hydronephrosis or urolithiasis.    BLADDER: Mild bladder wall thickening.  REPRODUCTIVE ORGANS: Hysterectomy.    BOWEL: No bowel obstruction. Appendix is normal.  PERITONEUM: No ascites.  VESSELS: Atherosclerotic changes.  RETROPERITONEUM/LYMPH NODES: No lymphadenopathy.  ABDOMINAL WALL: Within normal limits.  BONES: Degenerative changes.. There is extensive spondylosis. Atrophy of the anterior pelvic musculature and atrophy of the paraspinal musculature. There is a partially visualized jenifer and screws in the right femoral shaft.    IMPRESSION:  No acute pathology  Pacemaker  Paraspinal and pelvic muscular atrophy  Cholecystectomy    < end of copied text >      DVT PPX: PAS    Advance Directive:    Disposition: dc planning for tomorrow

## 2021-04-16 NOTE — CONSULT NOTE ADULT - ASSESSMENT
70 year old woman with DM, HTN, wheelchair bound due to polio, pacemaker, sent in for symptomatic anemia.     Small bowel AVM could be source of anemia, likely has more. Still needs proper colonoscopy. Ok for outpatient follow up in a close interval for repeat colonoscopy and consideration of capsule. See endo reports/OP note today. Cleared for discharge from a GI perspective.

## 2021-04-16 NOTE — PROGRESS NOTE ADULT - ASSESSMENT
Cardiac status is stable   Chest pain, atypical  Non obstructive CAD  DM  Severe Anemia      Suggest:  Atypical chest pain.  AVMs noted on endoscopy. Colonoscopy was a poor prep.  Anemia better s/p PRBC transfusion  Continue current cardiac meds  Cardiac status is stable  I shall f/u PRN now.

## 2021-04-16 NOTE — PROGRESS NOTE ADULT - ASSESSMENT
71 y/o female admitted with symptomatic severe iron deficiency anemia  No overt active bleeding.  Hx of weight loss in last 6-12 months.  R/o GI malignancy.  Comorbidities: DM, HTN, s/ p polio, wheelchair bound.    Transfused  PRBC.  Continue iv iron   GI w/up in progress- EGD/ colonoscopy today.

## 2021-04-17 LAB
ANION GAP SERPL CALC-SCNC: 4 MMOL/L — LOW (ref 5–17)
BUN SERPL-MCNC: 17 MG/DL — SIGNIFICANT CHANGE UP (ref 7–23)
CALCIUM SERPL-MCNC: 8.6 MG/DL — SIGNIFICANT CHANGE UP (ref 8.5–10.1)
CHLORIDE SERPL-SCNC: 112 MMOL/L — HIGH (ref 96–108)
CO2 SERPL-SCNC: 24 MMOL/L — SIGNIFICANT CHANGE UP (ref 22–31)
CREAT SERPL-MCNC: 0.86 MG/DL — SIGNIFICANT CHANGE UP (ref 0.5–1.3)
GLUCOSE SERPL-MCNC: 104 MG/DL — HIGH (ref 70–99)
HCT VFR BLD CALC: 26.3 % — LOW (ref 34.5–45)
HGB BLD-MCNC: 8 G/DL — LOW (ref 11.5–15.5)
MCHC RBC-ENTMCNC: 22.4 PG — LOW (ref 27–34)
MCHC RBC-ENTMCNC: 30.4 GM/DL — LOW (ref 32–36)
MCV RBC AUTO: 73.7 FL — LOW (ref 80–100)
PLATELET # BLD AUTO: 364 K/UL — SIGNIFICANT CHANGE UP (ref 150–400)
POTASSIUM SERPL-MCNC: 4.6 MMOL/L — SIGNIFICANT CHANGE UP (ref 3.5–5.3)
POTASSIUM SERPL-SCNC: 4.6 MMOL/L — SIGNIFICANT CHANGE UP (ref 3.5–5.3)
RBC # BLD: 3.57 M/UL — LOW (ref 3.8–5.2)
RBC # FLD: 20.5 % — HIGH (ref 10.3–14.5)
SODIUM SERPL-SCNC: 140 MMOL/L — SIGNIFICANT CHANGE UP (ref 135–145)
WBC # BLD: 15.85 K/UL — HIGH (ref 3.8–10.5)
WBC # FLD AUTO: 15.85 K/UL — HIGH (ref 3.8–10.5)

## 2021-04-17 PROCEDURE — 99232 SBSQ HOSP IP/OBS MODERATE 35: CPT

## 2021-04-17 RX ORDER — KETOROLAC TROMETHAMINE 30 MG/ML
15 SYRINGE (ML) INJECTION ONCE
Refills: 0 | Status: DISCONTINUED | OUTPATIENT
Start: 2021-04-17 | End: 2021-04-17

## 2021-04-17 RX ORDER — SIMETHICONE 80 MG/1
80 TABLET, CHEWABLE ORAL ONCE
Refills: 0 | Status: COMPLETED | OUTPATIENT
Start: 2021-04-17 | End: 2021-04-17

## 2021-04-17 RX ORDER — KETOROLAC TROMETHAMINE 30 MG/ML
10 SYRINGE (ML) INJECTION ONCE
Refills: 0 | Status: DISCONTINUED | OUTPATIENT
Start: 2021-04-17 | End: 2021-04-17

## 2021-04-17 RX ADMIN — LOSARTAN POTASSIUM 50 MILLIGRAM(S): 100 TABLET, FILM COATED ORAL at 09:10

## 2021-04-17 RX ADMIN — Medication 650 MILLIGRAM(S): at 23:00

## 2021-04-17 RX ADMIN — PANTOPRAZOLE SODIUM 40 MILLIGRAM(S): 20 TABLET, DELAYED RELEASE ORAL at 09:10

## 2021-04-17 RX ADMIN — SIMETHICONE 80 MILLIGRAM(S): 80 TABLET, CHEWABLE ORAL at 23:49

## 2021-04-17 RX ADMIN — SIMVASTATIN 40 MILLIGRAM(S): 20 TABLET, FILM COATED ORAL at 20:45

## 2021-04-17 RX ADMIN — Medication 1: at 14:11

## 2021-04-17 RX ADMIN — MONTELUKAST 10 MILLIGRAM(S): 4 TABLET, CHEWABLE ORAL at 09:10

## 2021-04-17 RX ADMIN — IRON SUCROSE 200 MILLIGRAM(S): 20 INJECTION, SOLUTION INTRAVENOUS at 20:43

## 2021-04-17 RX ADMIN — Medication 15 MILLIGRAM(S): at 14:07

## 2021-04-17 RX ADMIN — SERTRALINE 100 MILLIGRAM(S): 25 TABLET, FILM COATED ORAL at 09:10

## 2021-04-17 RX ADMIN — Medication 650 MILLIGRAM(S): at 20:54

## 2021-04-17 RX ADMIN — Medication 15 MILLIGRAM(S): at 14:46

## 2021-04-17 NOTE — PROGRESS NOTE ADULT - SUBJECTIVE AND OBJECTIVE BOX
CC: anemia and chest pain     HPI: The patient is a 71 yo female with Hx. of DM2, HTN, post Polio at age 5, wheel chair bound, s/p PPM,. was sent to  the ER by PCP Dr. Chávez because severe anemia detected on lab work done yesterday. The patient is also c/o intermittent chest pain not radiating. Rectal exam neg for blood done by ED MD.       Medical progress:   Complaints:  State of mind:     REVIEW OF SYSTEMS:  All other review of systems is negative unless indicated above    PHYSICAL EXAM:  ICU Vital Signs Last 24 Hrs  T(C): 36.2 (17 Apr 2021 09:11), Max: 36.9 (16 Apr 2021 23:10)  T(F): 97.2 (17 Apr 2021 09:11), Max: 98.5 (16 Apr 2021 23:10)  HR: 86 (17 Apr 2021 09:11) (70 - 99)  BP: 117/55 (17 Apr 2021 09:11) (104/41 - 139/60)  RR: 18 (17 Apr 2021 09:11) (14 - 18)  SpO2: 95% (17 Apr 2021 09:11) (92% - 100%)    Constitutional: NAD, awake and alert, well-developed  HEENT: PERR, EOMI, Normal Hearing, MMM  Neck: Soft and supple, No LAD, No JVD  Respiratory: Breath sounds are clear bilaterally, No wheezing, rales or rhonchi  Cardiovascular: S1 and S2, regular rate and rhythm, no Murmurs, gallops or rubs  Gastrointestinal: Bowel Sounds present, soft, nontender, nondistended, no guarding, no rebound  Extremities: No peripheral edema  Vascular: 2+ peripheral pulses  Neurological: A/O x 3, no focal deficits  Musculoskeletal: 5/5 strength b/l upper and lower extremities  Skin: No rashes    Labs:       CBC Full  -  ( 17 Apr 2021 08:19 )  WBC Count : 15.85 K/uL  RBC Count : 3.57 M/uL  Hemoglobin : 8.0 g/dL  Hematocrit : 26.3 %  Platelet Count - Automated : 364 K/uL  Mean Cell Volume : 73.7 fl  Mean Cell Hemoglobin : 22.4 pg  Mean Cell Hemoglobin Concentration : 30.4 gm/dL  Auto Neutrophil # : x  Auto Lymphocyte # : x  Auto Monocyte # : x  Auto Eosinophil # : x  Auto Basophil # : x  Auto Neutrophil % : x  Auto Lymphocyte % : x  Auto Monocyte % : x  Auto Eosinophil % : x  Auto Basophil % : x        04-17    140  |  112<H>  |  17  ----------------------------<  104<H>  4.6   |  24  |  0.86    Ca    8.6      17 Apr 2021 08:19    TPro  7.6  /  Alb  3.4  /  TBili  0.6  /  DBili  x   /  AST  15  /  ALT  11<L>  /  AlkPhos  81  04-16                           CC: anemia and chest pain     HPI: The patient is a 71 yo female with Hx. of DM2, HTN, post Polio at age 5, wheel chair bound, s/p PPM,. was sent to  the ER by PCP Dr. Chávez because severe anemia detected on lab work done yesterday. The patient is also c/o intermittent chest pain not radiating. Rectal exam neg for blood done by ED MD.       Medical progress: Patient states today she is not feeling well. Notes of b/l leg pain -  asking for pain meds. Denies any cough, urinary symptoms, diarrhea. We discussed that patient WBC is elevated and it could be secondary to infection vs acute pain. we will hold workup for infection as patient completely asymptomatic for infections and no signs of fevers, chills or rigors. Will repeat WBC in the am.   Complaints: b/l lower extremity pain  State of mind: maintains good conversation    REVIEW OF SYSTEMS:  All other review of systems is negative unless indicated above    PHYSICAL EXAM:  ICU Vital Signs Last 24 Hrs  T(C): 36.2 (17 Apr 2021 09:11), Max: 36.9 (16 Apr 2021 23:10)  T(F): 97.2 (17 Apr 2021 09:11), Max: 98.5 (16 Apr 2021 23:10)  HR: 86 (17 Apr 2021 09:11) (70 - 99)  BP: 117/55 (17 Apr 2021 09:11) (104/41 - 139/60)  RR: 18 (17 Apr 2021 09:11) (14 - 18)  SpO2: 95% (17 Apr 2021 09:11) (92% - 100%)    Constitutional: NAD, awake and alert, well-developed  HEENT: PERR, EOMI, Normal Hearing, MMM  Neck: Soft and supple, No LAD, No JVD  Respiratory: Breath sounds are clear bilaterally, No wheezing, rales or rhonchi  Cardiovascular: S1 and S2, regular rate and rhythm, no Murmurs, gallops or rubs  Gastrointestinal: Bowel Sounds present, soft, nontender, nondistended, no guarding, no rebound  Extremities: No peripheral edema  Vascular: 2+ peripheral pulses  Neurological: A/O x 3, no focal deficits  Musculoskeletal: 5/5 strength b/l upper and lower extremities  Skin: No rashes    Labs:       CBC Full  -  ( 17 Apr 2021 08:19 )  WBC Count : 15.85 K/uL  RBC Count : 3.57 M/uL  Hemoglobin : 8.0 g/dL  Hematocrit : 26.3 %  Platelet Count - Automated : 364 K/uL  Mean Cell Volume : 73.7 fl  Mean Cell Hemoglobin : 22.4 pg  Mean Cell Hemoglobin Concentration : 30.4 gm/dL  Auto Neutrophil # : x  Auto Lymphocyte # : x  Auto Monocyte # : x  Auto Eosinophil # : x  Auto Basophil # : x  Auto Neutrophil % : x  Auto Lymphocyte % : x  Auto Monocyte % : x  Auto Eosinophil % : x  Auto Basophil % : x        04-17    140  |  112<H>  |  17  ----------------------------<  104<H>  4.6   |  24  |  0.86    Ca    8.6      17 Apr 2021 08:19    TPro  7.6  /  Alb  3.4  /  TBili  0.6  /  DBili  x   /  AST  15  /  ALT  11<L>  /  AlkPhos  81  04-16                           CC: anemia and chest pain     HPI: The patient is a 69 yo female with Hx. of DM2, HTN, post Polio at age 5, wheel chair bound, s/p PPM,. was sent to  the ER by PCP Dr. Chávez because severe anemia detected on lab work done yesterday. The patient is also c/o intermittent chest pain not radiating. Rectal exam neg for blood done by ED MD.       Medical progress: Patient seen and eval. hemodynamically stable. Denies any HA, CP, SOB. Patient with low HH -  will transfuse 1 unit of prbc   Complaints: no new complaints  State of mind: maintains good conversation    REVIEW OF SYSTEMS:  All other review of systems is negative unless indicated above    PHYSICAL EXAM:  ICU Vital Signs Last 24 Hrs  T(C): 36.5 (18 Apr 2021 08:56), Max: 36.7 (17 Apr 2021 22:03)  T(F): 97.7 (18 Apr 2021 08:56), Max: 98.1 (17 Apr 2021 22:03)  HR: 62 (18 Apr 2021 08:56) (62 - 78)  BP: 130/68 (18 Apr 2021 08:56) (112/66 - 130/68)  RR: 18 (18 Apr 2021 08:56) (18 - 18)  SpO2: 99% (18 Apr 2021 08:56) (98% - 99%)    Constitutional: NAD, awake and alert, well-developed  HEENT: PERR, EOMI, Normal Hearing, MMM    CBC Full  -  ( 18 Apr 2021 08:24 )  WBC Count : 9.44 K/uL  RBC Count : 3.43 M/uL  Hemoglobin : 7.6 g/dL  Hematocrit : 25.5 %  Platelet Count - Automated : 333 K/uL  Mean Cell Volume : 74.3 fl  Mean Cell Hemoglobin : 22.2 pg  Mean Cell Hemoglobin Concentration : 29.8 gm/dL  Auto Neutrophil # : x  Auto Lymphocyte # : x  Auto Monocyte # : x  Auto Eosinophil # : x  Auto Basophil # : x  Auto Neutrophil % : x  Auto Lymphocyte % : x  Auto Monocyte % : x  Auto Eosinophil % : x  Auto Basophil % : x        04-18    138  |  111<H>  |  20  ----------------------------<  90  4.2   |  24  |  0.76    Ca    8.8      18 Apr 2021 08:24    Neck: Soft and supple, No LAD, No JVD  Respiratory: Breath sounds are clear bilaterally, No wheezing, rales or rhonchi  Cardiovascular: S1 and S2, regular rate and rhythm, no Murmurs, gallops or rubs  Gastrointestinal: Bowel Sounds present, soft, nontender, nondistended, no guarding, no rebound  Extremities: No peripheral edema  Vascular: 2+ peripheral pulses  Neurological: A/O x 3, no focal deficits  Musculoskeletal: 5/5 strength b/l upper and lower extremities  Skin: No rashes    Labs:

## 2021-04-17 NOTE — PROGRESS NOTE ADULT - ASSESSMENT
71 yo female with Hx. of DM2, HTN, post Polio at age 5, wheel chair bound, s/p PPM,, with 30 lbs weight loss in one year, was sent to  the ER by PCP Dr. Chávez  because of severe anemia detected on lab work done yesterday. The patient is also c/o intermittent chest pain not radiating. Rectal exam neg for blood done by ED.    # Symptomatic anemia  # Acute iron deficient anemia   - HH dropped from 6.2 on admission to 5.7   - Rectal exam neg for blood done by ED  - concern for malignancy - lost 39 lbs in one year   - sp  2 units prbc - H increased to 8.1  - CT of chest, abdomen and pelvis - no malignancy, wnl   - s/p EGD/colonoscopy on 4/16 with Dr. 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 colonoscopy - no mass lesions    - IV iron  - cannot have iron tid as she did not tolerate in the past and will follow up in 2 weeks with Dr. Nobles - D/W Dr. Nobles   - f/u CEA results outpatient with Dr. Nobles  - follow up with GI Dr. Rangel in 1-2 weeks      # Chest pain   - resolved, in the setting of acute anemia  - trops neg x 2  - pt. with PPM - interrogated by EP - normal functioning, no Afib or arrhythmia   - TTE done -    - Cardiology consult appreciated - cardiac status is stable     # HTN  - controlled  - cont. losartan    # Post polio osteopathy   - wheechair bound   - PT     # Leukocytosis  - no signs of infection  - no fevers   71 yo female with Hx. of DM2, HTN, post Polio at age 5, wheel chair bound, s/p PPM,, with 30 lbs weight loss in one year, was sent to  the ER by PCP Dr. Chávez  because of severe anemia detected on lab work done yesterday. The patient is also c/o intermittent chest pain not radiating. Rectal exam neg for blood done by ED.    # Symptomatic anemia  # Acute iron deficient anemia   - HH dropped from 6.2 on admission to 5.7   - Rectal exam neg for blood done by ED  - concern for malignancy - lost 39 lbs in one year   - sp  2 units prbc - H increased to 8.1  - CT of chest, abdomen and pelvis - no malignancy, wnl   - s/p EGD/colonoscopy on 4/16 with Dr. 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 colonoscopy - no mass lesions    - IV iron  - cannot have iron tid as she did not tolerate in the past and will follow up in 2 weeks with Dr. Nobles - D/W Dr. Nobles   - f/u CEA results outpatient with Dr. Nobles  - follow up with GI Dr. Rangel in 1-2 weeks      # Chest pain   - resolved, in the setting of acute anemia  - trops neg x 2  - pt. with PPM - interrogated by EP - normal functioning, no Afib or arrhythmia   - TTE done -    - Cardiology consult appreciated - cardiac status is stable     # HTN  - controlled  - cont. losartan    # Post polio osteopathy   - wheechair bound   - PT     # Leukocytosis - secondary to pain vs infection  - no signs of infection  - no fevers   71 yo female with Hx. of DM2, HTN, post Polio at age 5, wheel chair bound, s/p PPM,, with 30 lbs weight loss in one year, was sent to  the ER by PCP Dr. Chávez  because of severe anemia detected on lab work done yesterday. The patient is also c/o intermittent chest pain not radiating. Rectal exam neg for blood done by ED.    # Symptomatic anemia  # Acute iron deficient anemia   - HH 7.6 this am  - Rectal exam neg for blood done by ED  - concern for malignancy - lost 39 lbs in one year   - will give her 1 more units of prbc, total of 3 units of PRBC  - CT of chest, abdomen and pelvis - no malignancy, wnl   - s/p EGD/colonoscopy on 4/16 with Dr. 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 colonoscopy - no mass lesions    - IV iron  - cannot have iron tid as she did not tolerate in the past and will follow up in 2 weeks with Dr. Nobles - D/W Dr. Nobles   - f/u CEA results outpatient with Dr. Nobles  - follow up with GI Dr. Rangel in 1-2 weeks      # Chest pain   - resolved, in the setting of acute anemia  - trops neg x 2  - pt. with PPM - interrogated by EP - normal functioning, no Afib or arrhythmia   - TTE done -    - Cardiology consult appreciated - cardiac status is stable     # HTN  - controlled  - cont. losartan    # Post polio osteopathy   - wheechair bound   - PT     # Leukocytosis - secondary to pain vs infection  - no signs of infection  - no fevers

## 2021-04-18 LAB
ANION GAP SERPL CALC-SCNC: 3 MMOL/L — LOW (ref 5–17)
BUN SERPL-MCNC: 20 MG/DL — SIGNIFICANT CHANGE UP (ref 7–23)
CALCIUM SERPL-MCNC: 8.8 MG/DL — SIGNIFICANT CHANGE UP (ref 8.5–10.1)
CHLORIDE SERPL-SCNC: 111 MMOL/L — HIGH (ref 96–108)
CO2 SERPL-SCNC: 24 MMOL/L — SIGNIFICANT CHANGE UP (ref 22–31)
CREAT SERPL-MCNC: 0.76 MG/DL — SIGNIFICANT CHANGE UP (ref 0.5–1.3)
GLUCOSE SERPL-MCNC: 90 MG/DL — SIGNIFICANT CHANGE UP (ref 70–99)
HCT VFR BLD CALC: 25.5 % — LOW (ref 34.5–45)
HGB BLD-MCNC: 7.6 G/DL — LOW (ref 11.5–15.5)
MCHC RBC-ENTMCNC: 22.2 PG — LOW (ref 27–34)
MCHC RBC-ENTMCNC: 29.8 GM/DL — LOW (ref 32–36)
MCV RBC AUTO: 74.3 FL — LOW (ref 80–100)
PLATELET # BLD AUTO: 333 K/UL — SIGNIFICANT CHANGE UP (ref 150–400)
POTASSIUM SERPL-MCNC: 4.2 MMOL/L — SIGNIFICANT CHANGE UP (ref 3.5–5.3)
POTASSIUM SERPL-SCNC: 4.2 MMOL/L — SIGNIFICANT CHANGE UP (ref 3.5–5.3)
RBC # BLD: 3.43 M/UL — LOW (ref 3.8–5.2)
RBC # FLD: 21.7 % — HIGH (ref 10.3–14.5)
SODIUM SERPL-SCNC: 138 MMOL/L — SIGNIFICANT CHANGE UP (ref 135–145)
WBC # BLD: 9.44 K/UL — SIGNIFICANT CHANGE UP (ref 3.8–10.5)
WBC # FLD AUTO: 9.44 K/UL — SIGNIFICANT CHANGE UP (ref 3.8–10.5)

## 2021-04-18 PROCEDURE — 99232 SBSQ HOSP IP/OBS MODERATE 35: CPT

## 2021-04-18 PROCEDURE — 86077 PHYS BLOOD BANK SERV XMATCH: CPT

## 2021-04-18 PROCEDURE — 93010 ELECTROCARDIOGRAM REPORT: CPT

## 2021-04-18 RX ADMIN — SERTRALINE 100 MILLIGRAM(S): 25 TABLET, FILM COATED ORAL at 09:50

## 2021-04-18 RX ADMIN — SIMVASTATIN 40 MILLIGRAM(S): 20 TABLET, FILM COATED ORAL at 22:02

## 2021-04-18 RX ADMIN — LOSARTAN POTASSIUM 50 MILLIGRAM(S): 100 TABLET, FILM COATED ORAL at 09:49

## 2021-04-18 RX ADMIN — Medication 30 MILLILITER(S): at 18:03

## 2021-04-18 RX ADMIN — Medication 1: at 17:24

## 2021-04-18 RX ADMIN — MONTELUKAST 10 MILLIGRAM(S): 4 TABLET, CHEWABLE ORAL at 09:49

## 2021-04-18 RX ADMIN — IRON SUCROSE 200 MILLIGRAM(S): 20 INJECTION, SOLUTION INTRAVENOUS at 22:02

## 2021-04-18 RX ADMIN — PANTOPRAZOLE SODIUM 40 MILLIGRAM(S): 20 TABLET, DELAYED RELEASE ORAL at 09:49

## 2021-04-18 NOTE — PROVIDER CONTACT NOTE (OTHER) - NAME OF MD/NP/PA/DO NOTIFIED:
cardiology
Consult called in for Dr. Rangel. CHRISTIANNE Gifford at [facility] [place]
Dr. Mcpherson
Dr. Arroyo

## 2021-04-18 NOTE — PHYSICAL THERAPY INITIAL EVALUATION ADULT - PERTINENT HX OF CURRENT PROBLEM, REHAB EVAL
Pt admitted to  secondary to severe anemia. Pt sent by PCP to  ED. Pt with 30 lb weight loss over the past year. COVID 19: neg. H/H- 7.6/25.5.

## 2021-04-18 NOTE — PHYSICAL THERAPY INITIAL EVALUATION ADULT - ACTIVE RANGE OF MOTION EXAMINATION, REHAB EVAL
Right hip flex ~ 80 degrees and DF neutral. Left hip flex ~ 100 degrees and DF neutral./Left UE Active ROM was WFL (within functional limits)/Right UE Active ROM was WFL (within functional limits)

## 2021-04-18 NOTE — PHYSICAL THERAPY INITIAL EVALUATION ADULT - GENERAL OBSERVATIONS, REHAB EVAL
Pt found supine in bed with tele monitor and bed alarm activated. Noted right LE ER in supine. Pt with no c/o pain.

## 2021-04-18 NOTE — PHYSICAL THERAPY INITIAL EVALUATION ADULT - MANUAL MUSCLE TESTING RESULTS, REHAB EVAL
Bilateral UE strength 4/5 throughout grossly. Right LE strength 2+-3/5 throughout grossly. Left LE strength 3-3+/5 throughout grossly.

## 2021-04-18 NOTE — PROVIDER CONTACT NOTE (OTHER) - SITUATION
Spoke with Carlie
answering service aware of consult.
s/p blood transfusion VS: BP 99/76, HR 74  rpt VS: 138/64, HR 77

## 2021-04-19 ENCOUNTER — TRANSCRIPTION ENCOUNTER (OUTPATIENT)
Age: 71
End: 2021-04-19

## 2021-04-19 VITALS
DIASTOLIC BLOOD PRESSURE: 55 MMHG | TEMPERATURE: 98 F | HEART RATE: 70 BPM | SYSTOLIC BLOOD PRESSURE: 132 MMHG | RESPIRATION RATE: 20 BRPM | OXYGEN SATURATION: 97 %

## 2021-04-19 LAB
ANION GAP SERPL CALC-SCNC: 4 MMOL/L — LOW (ref 5–17)
BUN SERPL-MCNC: 24 MG/DL — HIGH (ref 7–23)
CALCIUM SERPL-MCNC: 8.4 MG/DL — LOW (ref 8.5–10.1)
CHLORIDE SERPL-SCNC: 112 MMOL/L — HIGH (ref 96–108)
CO2 SERPL-SCNC: 23 MMOL/L — SIGNIFICANT CHANGE UP (ref 22–31)
CREAT SERPL-MCNC: 0.77 MG/DL — SIGNIFICANT CHANGE UP (ref 0.5–1.3)
GLUCOSE SERPL-MCNC: 107 MG/DL — HIGH (ref 70–99)
HCT VFR BLD CALC: 29.2 % — LOW (ref 34.5–45)
HGB BLD-MCNC: 9 G/DL — LOW (ref 11.5–15.5)
MCHC RBC-ENTMCNC: 23.6 PG — LOW (ref 27–34)
MCHC RBC-ENTMCNC: 30.8 GM/DL — LOW (ref 32–36)
MCV RBC AUTO: 76.4 FL — LOW (ref 80–100)
OB PNL STL: NEGATIVE — SIGNIFICANT CHANGE UP
PLATELET # BLD AUTO: 313 K/UL — SIGNIFICANT CHANGE UP (ref 150–400)
POTASSIUM SERPL-MCNC: 4.5 MMOL/L — SIGNIFICANT CHANGE UP (ref 3.5–5.3)
POTASSIUM SERPL-SCNC: 4.5 MMOL/L — SIGNIFICANT CHANGE UP (ref 3.5–5.3)
RBC # BLD: 3.82 M/UL — SIGNIFICANT CHANGE UP (ref 3.8–5.2)
RBC # FLD: 22.8 % — HIGH (ref 10.3–14.5)
SODIUM SERPL-SCNC: 139 MMOL/L — SIGNIFICANT CHANGE UP (ref 135–145)
WBC # BLD: 11.15 K/UL — HIGH (ref 3.8–10.5)
WBC # FLD AUTO: 11.15 K/UL — HIGH (ref 3.8–10.5)

## 2021-04-19 PROCEDURE — 99232 SBSQ HOSP IP/OBS MODERATE 35: CPT

## 2021-04-19 RX ORDER — TRAMADOL HYDROCHLORIDE 50 MG/1
1 TABLET ORAL
Qty: 0 | Refills: 0 | DISCHARGE

## 2021-04-19 RX ORDER — KETOROLAC TROMETHAMINE 30 MG/ML
30 SYRINGE (ML) INJECTION ONCE
Refills: 0 | Status: DISCONTINUED | OUTPATIENT
Start: 2021-04-19 | End: 2021-04-19

## 2021-04-19 RX ORDER — ASPIRIN/CALCIUM CARB/MAGNESIUM 324 MG
1 TABLET ORAL
Qty: 0 | Refills: 0 | DISCHARGE

## 2021-04-19 RX ORDER — PANTOPRAZOLE SODIUM 20 MG/1
1 TABLET, DELAYED RELEASE ORAL
Qty: 30 | Refills: 0
Start: 2021-04-19 | End: 2021-05-18

## 2021-04-19 RX ORDER — JNJ-78436735 50000000000 [PFU]/.5ML
0.5 SUSPENSION INTRAMUSCULAR
Qty: 0 | Refills: 0 | DISCHARGE

## 2021-04-19 RX ORDER — PANTOPRAZOLE SODIUM 20 MG/1
40 TABLET, DELAYED RELEASE ORAL
Refills: 0 | Status: DISCONTINUED | OUTPATIENT
Start: 2021-04-19 | End: 2021-04-19

## 2021-04-19 RX ADMIN — LOSARTAN POTASSIUM 50 MILLIGRAM(S): 100 TABLET, FILM COATED ORAL at 11:07

## 2021-04-19 RX ADMIN — SERTRALINE 100 MILLIGRAM(S): 25 TABLET, FILM COATED ORAL at 11:07

## 2021-04-19 RX ADMIN — Medication 30 MILLIGRAM(S): at 06:55

## 2021-04-19 RX ADMIN — PANTOPRAZOLE SODIUM 40 MILLIGRAM(S): 20 TABLET, DELAYED RELEASE ORAL at 11:07

## 2021-04-19 RX ADMIN — MONTELUKAST 10 MILLIGRAM(S): 4 TABLET, CHEWABLE ORAL at 11:07

## 2021-04-19 NOTE — DISCHARGE NOTE PROVIDER - NSDCCPCAREPLAN_GEN_ALL_CORE_FT
PRINCIPAL DISCHARGE DIAGNOSIS  Diagnosis: Anemia  Assessment and Plan of Treatment: # Symptomatic anemia  # Acute iron deficient anemia   - HH 7.6 this am  - Rectal exam neg for blood done by ED  - concern for malignancy - lost 39 lbs in one year   - will give her 1 more units of prbc, total of 3 units of PRBC  - CT of chest, abdomen and pelvis - no malignancy, wnl   - s/p EGD/colonoscopy on 4/16 with Dr. 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 colonoscopy - no mass lesions    - IV iron  - cannot have iron tid as she did not tolerate in the past and will follow up in 2 weeks with Dr. Nobles - D/W Dr. Nobles   - f/u CEA results outpatient with Dr. Nobles  - follow up with GI Dr. Rangel in 1-2 weeks            SECONDARY DISCHARGE DIAGNOSES  Diagnosis: Chest pain  Assessment and Plan of Treatment: # Chest pain   - resolved, in the setting of acute anemia  - trops neg x 2  - pt. with PPM - interrogated by EP - normal functioning, no Afib or arrhythmia   - TTE done -    - Cardiology consult appreciated - cardiac status is stable       Diagnosis: Leukocytosis  Assessment and Plan of Treatment: # Leukocytosis - secondary to pain vs infection  - no signs of infection  - no fevers     PRINCIPAL DISCHARGE DIAGNOSIS  Diagnosis: Anemia  Assessment and Plan of Treatment: # Symptomatic anemia  # Acute iron deficient anemia   - HH 9.0 this am sp 3 units prbc this admission  - Rectal exam neg for blood done by ED  - concern for malignancy - lost 39 lbs in one year   - CT of chest, abdomen and pelvis - no malignancy, wnl   - s/p EGD/colonoscopy on 4/16 with Dr. 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 colonoscopy - no mass lesions    - IV iron  - cannot have iron tid as she did not tolerate in the past and will follow up in 2 weeks with Dr. Nobles - D/W Dr. Nobles   - f/u CEA results outpatient with Dr. Nobles  - follow up with GI Dr. Rangel in 1-2 weeks   - follow up with PCP next week to check CBC - monitor H/H and WBC           SECONDARY DISCHARGE DIAGNOSES  Diagnosis: Chest pain  Assessment and Plan of Treatment: # Chest pain   - resolved, in the setting of acute anemia  - trops neg x 2  - pt. with PPM - interrogated by EP - normal functioning, no Afib or arrhythmia   - TTE done -    - Cardiology consult appreciated - cardiac status is stable       Diagnosis: Leukocytosis  Assessment and Plan of Treatment: # Leukocytosis - secondary to pain vs infection  - no signs of infection  - no fevers

## 2021-04-19 NOTE — DISCHARGE NOTE PROVIDER - NSDCFUADDINST_GEN_ALL_CORE_FT
- follow up with heme-onc - Dr. Arias in 1-2 weeks  - follow up with GI - Dr. Rangel in 1-2 weeks  - check CBC - to monitor WBC outpatient in one week with PCP  - follow up with heme-onc - Dr. Arias in 1-2 weeks  - follow up with GI - Dr. Rangel in 1-2 weeks  - check CBC - to monitor H/H and WBC outpatient in one week with PCP

## 2021-04-19 NOTE — DISCHARGE NOTE NURSING/CASE MANAGEMENT/SOCIAL WORK - PATIENT PORTAL LINK FT
You can access the FollowMyHealth Patient Portal offered by [facility] by registering at the following website: [email or web address]. By joining [facility]’s FollowMyHealth portal, you will also be able to view your health information using other applications (apps) compatible with our system.

## 2021-04-19 NOTE — DISCHARGE NOTE PROVIDER - NSDCMRMEDTOKEN_GEN_ALL_CORE_FT
losartan 50 mg oral tablet: 1 tab(s) orally once a day  metFORMIN 500 mg oral tablet: 2 tab(s) orally 2 times a day  montelukast 10 mg oral tablet: 1 tab(s) orally once a day  pantoprazole 40 mg oral delayed release tablet: 1 tab(s) orally once a day (before a meal)  simvastatin 40 mg oral tablet: 1 tab(s) orally once a day (at bedtime)  Wixela Inhub 100 mcg-50 mcg inhalation powder: 1 puff(s) inhaled 2 times a day  Zoloft 100 mg oral tablet: 1 tab(s) orally once a day

## 2021-04-19 NOTE — DISCHARGE NOTE PROVIDER - NSDCCPTREATMENT_GEN_ALL_CORE_FT
PRINCIPAL PROCEDURE  Procedure: CT of head, chest, abdomen, and pelvis with contrast  Findings and Treatment:   IMPRESSION:  No acute pathology  Pacemaker  Paraspinal and pelvic muscular atrophy  Cholecystectomy

## 2021-04-19 NOTE — DISCHARGE NOTE PROVIDER - CARE PROVIDERS DIRECT ADDRESSES
[email or web address]

## 2021-04-19 NOTE — DISCHARGE NOTE PROVIDER - PROVIDER TOKENS
PROVIDER:[TOKEN:[[id number]]],PROVIDER:[TOKEN:[[id number]]],PROVIDER:[TOKEN:[[id number]]]

## 2021-04-19 NOTE — DISCHARGE NOTE PROVIDER - HOSPITAL COURSE
69 yo female with Hx. of DM2, HTN, post Polio at age 5, wheel chair bound, s/p PPM,, with 30 lbs weight loss in one year, was sent to  the ER by PCP Dr. Chávez  because of severe anemia detected on lab work done yesterday. The patient is also c/o intermittent chest pain not radiating. Rectal exam neg for blood done by ED.    # Symptomatic anemia  # Acute iron deficient anemia   - HH 7.6 this am  - Rectal exam neg for blood done by ED  - concern for malignancy - lost 39 lbs in one year   - will give her 1 more units of prbc, total of 3 units of PRBC  - CT of chest, abdomen and pelvis - no malignancy, wnl   - s/p EGD/colonoscopy on 4/16 with Dr. 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 colonoscopy - no mass lesions    - IV iron  - cannot have iron tid as she did not tolerate in the past and will follow up in 2 weeks with Dr. Nobles - D/W Dr. Nobles   - f/u CEA results outpatient with Dr. Nobles  - follow up with GI Dr. Rangel in 1-2 weeks      # Chest pain   - resolved, in the setting of acute anemia  - trops neg x 2  - pt. with PPM - interrogated by EP - normal functioning, no Afib or arrhythmia   - TTE done -    - Cardiology consult appreciated - cardiac status is stable     # HTN  - controlled  - cont. losartan    # Post polio osteopathy   - wheechair bound   - PT     # Leukocytosis - secondary to pain vs infection  - no signs of infection  - no fevers    Pt. is stable for discharge, will follow up with GI, Heme/onc and PCP.     PHYSICAL EXAM:  Vital Signs Last 24 Hrs  T(C): 36.8 (19 Apr 2021 08:20), Max: 37.1 (18 Apr 2021 22:45)  T(F): 98.3 (19 Apr 2021 08:20), Max: 98.7 (18 Apr 2021 22:45)  HR: 70 (19 Apr 2021 08:20) (70 - 82)  BP: 132/55 (19 Apr 2021 08:20) (99/76 - 138/64)  BP(mean): --  RR: 20 (19 Apr 2021 08:20) (18 - 20)  SpO2: 97% (19 Apr 2021 08:20) (97% - 100%)  Constitutional: NAD, awake and alert, well-developed  HEENT: PERR, EOMI, Normal Hearing, MMM  Neck: Soft and supple, No LAD, No JVD  Respiratory: Breath sounds are clear bilaterally, No wheezing, rales or rhonchi  Cardiovascular: S1 and S2, regular rate and rhythm, no Murmurs, gallops or rubs  Gastrointestinal: Bowel Sounds present, soft, nontender, nondistended, no guarding, no rebound  Extremities: No peripheral edema  Vascular: 2+ peripheral pulses  Neurological: A/O x 3, no focal deficits  Musculoskeletal: 5/5 strength b/l upper and lower extremities  Skin: No rashes   71 yo female with Hx. of DM2, HTN, post Polio at age 5, wheel chair bound, s/p PPM,, with 30 lbs weight loss in one year, was sent to  the ER by PCP Dr. Chávez  because of severe anemia detected on lab work done yesterday. The patient is also c/o intermittent chest pain not radiating. Rectal exam neg for blood done by ED.    # Symptomatic anemia  # Acute iron deficient anemia   - HH 9.0 this am s/p 3 units prbc this admission   - Rectal exam neg for blood done by ED  - concern for malignancy - lost 39 lbs in one year   - CT of chest, abdomen and pelvis - no malignancy, wnl   - s/p EGD/colonoscopy on 4/16 with Dr. 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 colonoscopy - no mass lesions    - IV iron  - cannot have iron tid as she did not tolerate in the past and will follow up in 2 weeks with Dr. Nobles - D/W Dr. Nobles   - f/u CEA results outpatient with Dr. Nobles  - follow up with GI Dr. Rangel in 1-2 weeks   - follow up with PCP next week to check CBC - monitor H/H and WBC     # Chest pain   - resolved, in the setting of acute anemia  - trops neg x 2  - pt. with PPM - interrogated by EP - normal functioning, no Afib or arrhythmia   - TTE done -    - Cardiology consult appreciated - cardiac status is stable     # HTN  - controlled  - cont. losartan    # Post polio osteopathy   - wheechair bound   - PT     # Leukocytosis - secondary to pain vs infection  - no signs of infection  - no fevers    Pt. is stable for discharge, will follow up with GI, Heme/onc and PCP.     PHYSICAL EXAM:  Vital Signs Last 24 Hrs  T(C): 36.8 (19 Apr 2021 08:20), Max: 37.1 (18 Apr 2021 22:45)  T(F): 98.3 (19 Apr 2021 08:20), Max: 98.7 (18 Apr 2021 22:45)  HR: 70 (19 Apr 2021 08:20) (70 - 82)  BP: 132/55 (19 Apr 2021 08:20) (99/76 - 138/64)  BP(mean): --  RR: 20 (19 Apr 2021 08:20) (18 - 20)  SpO2: 97% (19 Apr 2021 08:20) (97% - 100%)  Constitutional: NAD, awake and alert, well-developed  HEENT: PERR, EOMI, Normal Hearing, MMM  Neck: Soft and supple, No LAD, No JVD  Respiratory: Breath sounds are clear bilaterally, No wheezing, rales or rhonchi  Cardiovascular: S1 and S2, regular rate and rhythm, no Murmurs, gallops or rubs  Gastrointestinal: Bowel Sounds present, soft, nontender, nondistended, no guarding, no rebound  Extremities: No peripheral edema  Vascular: 2+ peripheral pulses  Neurological: A/O x 3, no focal deficits  Musculoskeletal: 5/5 strength b/l upper and lower extremities  Skin: No rashes   69 yo female with Hx. of DM2, HTN, post Polio at age 5, wheel chair bound, s/p PPM,, with 30 lbs weight loss in one year, was sent to  the ER by PCP Dr. Chávez  because of severe anemia detected on lab work done yesterday. The patient is also c/o intermittent chest pain not radiating. Rectal exam neg for blood done by ED.    Patient seen and eval. Hemodynamically stable. We discusswed with patient to closely follow up with PCP / GI doctor. If she noticed any dark stools or bloody stools to come back in the hospital. Patient understands the recommendations.     # Symptomatic anemia  # Acute iron deficient anemia   - HH 9.0 this am s/p 3 units prbc this admission   - Rectal exam neg for blood done by ED  - concern for malignancy - lost 39 lbs in one year   - CT of chest, abdomen and pelvis - no malignancy, wnl   - s/p EGD/colonoscopy on 4/16 with Dr. 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 colonoscopy - no mass lesions    - IV iron  - cannot have iron tid as she did not tolerate in the past and will follow up in 2 weeks with Dr. Nobles - D/W Dr. Nobles   - f/u CEA results outpatient with Dr. Nobles  - follow up with GI Dr. Rangel in 1-2 weeks   - follow up with PCP next week to check CBC - monitor H/H and WBC     # Chest pain   - resolved, in the setting of acute anemia  - trops neg x 2  - pt. with PPM - interrogated by EP - normal functioning, no Afib or arrhythmia   - TTE done -    - Cardiology consult appreciated - cardiac status is stable     # HTN  - controlled  - cont. losartan    # Post polio osteopathy   - wheechair bound   - PT     # Leukocytosis - secondary to pain vs infection  - no signs of infection  - no fevers    Pt. is stable for discharge, will follow up with GI, Heme/onc and PCP.     PHYSICAL EXAM:  Vital Signs Last 24 Hrs  T(C): 36.8 (19 Apr 2021 08:20), Max: 37.1 (18 Apr 2021 22:45)  T(F): 98.3 (19 Apr 2021 08:20), Max: 98.7 (18 Apr 2021 22:45)  HR: 70 (19 Apr 2021 08:20) (70 - 82)  BP: 132/55 (19 Apr 2021 08:20) (99/76 - 138/64)  BP(mean): --  RR: 20 (19 Apr 2021 08:20) (18 - 20)  SpO2: 97% (19 Apr 2021 08:20) (97% - 100%)  Constitutional: NAD, awake and alert, well-developed  HEENT: PERR, EOMI, Normal Hearing, MMM  Neck: Soft and supple, No LAD, No JVD  Respiratory: Breath sounds are clear bilaterally, No wheezing, rales or rhonchi  Cardiovascular: S1 and S2, regular rate and rhythm, no Murmurs, gallops or rubs  Gastrointestinal: Bowel Sounds present, soft, nontender, nondistended, no guarding, no rebound  Extremities: No peripheral edema  Vascular: 2+ peripheral pulses  Neurological: A/O x 3, no focal deficits  Musculoskeletal: 5/5 strength b/l upper and lower extremities  Skin: No rashes      CBC Full  -  ( 19 Apr 2021 07:19 )  WBC Count : 11.15 K/uL  RBC Count : 3.82 M/uL  Hemoglobin : 9.0 g/dL  Hematocrit : 29.2 %  Platelet Count - Automated : 313 K/uL  Mean Cell Volume : 76.4 fl  Mean Cell Hemoglobin : 23.6 pg  Mean Cell Hemoglobin Concentration : 30.8 gm/dL  Auto Neutrophil # : x  Auto Lymphocyte # : x  Auto Monocyte # : x  Auto Eosinophil # : x  Auto Basophil # : x  Auto Neutrophil % : x  Auto Lymphocyte % : x  Auto Monocyte % : x  Auto Eosinophil % : x  Auto Basophil % : x        04-19    139  |  112<H>  |  24<H>  ----------------------------<  107<H>  4.5   |  23  |  0.77    Ca    8.4<L>      19 Apr 2021 07:19

## 2021-04-19 NOTE — DISCHARGE NOTE PROVIDER - CARE PROVIDER_API CALL
Brownyn Vila  HEMATOLOGY  [address], [place] [address]  Phone: [phone]  Fax: [phone]  Follow Up Time:     Sherif Rangel)  Gastroenterology; Internal Medicine  [address] [place] [address]  Phone: [phone]  Fax: [phone]  Follow Up Time:     Susan Fletcher  INTERNAL MEDICINE  [address] [place] [address]  Phone: [phone]  Fax: [phone]  Follow Up Time:

## 2021-04-20 LAB — SURGICAL PATHOLOGY STUDY: SIGNIFICANT CHANGE UP

## 2021-04-21 ENCOUNTER — APPOINTMENT (OUTPATIENT)
Dept: HOME HEALTH SERVICES | Facility: HOME HEALTH | Age: 71
End: 2021-04-21

## 2021-04-21 ENCOUNTER — NON-APPOINTMENT (OUTPATIENT)
Age: 71
End: 2021-04-21

## 2021-04-21 RX ORDER — TRAMADOL HYDROCHLORIDE 50 MG/1
50 TABLET, COATED ORAL
Qty: 28 | Refills: 0 | Status: DISCONTINUED | COMMUNITY
Start: 2020-07-14 | End: 2021-04-21

## 2021-04-21 RX ORDER — ASPIRIN 81 MG
81 TABLET, DELAYED RELEASE (ENTERIC COATED) ORAL
Refills: 0 | Status: DISCONTINUED | COMMUNITY
End: 2021-04-21

## 2021-04-21 RX ORDER — ASPIRIN 81 MG/1
81 TABLET, COATED ORAL
Qty: 90 | Refills: 0 | Status: DISCONTINUED | COMMUNITY
Start: 2019-10-28 | End: 2021-04-21

## 2021-04-22 ENCOUNTER — APPOINTMENT (OUTPATIENT)
Dept: HEMATOLOGY ONCOLOGY | Facility: CLINIC | Age: 71
End: 2021-04-22

## 2021-04-22 ENCOUNTER — LABORATORY RESULT (OUTPATIENT)
Age: 71
End: 2021-04-22

## 2021-04-23 NOTE — CDI QUERY NOTE - NSCDIOTHERTXTBX_GEN_ALL_CORE_HH
Patient is documented with acute anemia.     Please clarify the type of anemia to capture the highest level of specificity known.    A) Acute blood loss anemia due to small bowel avm  B) Acute iron deficiency anemia  C) Unable to determine  D) Other (please specify)  F) Not clinically significant    SUPPORTING DOCUMENTATION:    HEMOGLOBIN LEVELS:  Hemoglobin: 9.0 g/dL *L* [11.5 - 15.5] (04-19-21)  Hemoglobin: 7.6 g/dL *L* [11.5 - 15.5] (04-18-21)  Hemoglobin: 8.0 g/dL *L* [11.5 - 15.5] (04-17-21)  Hemoglobin: 8.1 g/dL *L* [11.5 - 15.5] (04-16-21)  Hemoglobin: 7.9 g/dL *L* [11.5 - 15.5] (04-15-21)  Hemoglobin: 5.7 g/dL *LL* [11.5 - 15.5] (04-15-21)  Hemoglobin: 6.2 g/dL *LL* [11.5 - 15.5] (04-14-21)    Gastroenterology documentation on 4/16/2021:  Small bowel AVM could be source of anemia, likely has more. Still needs proper colonoscopy. Ok for outpatient follow up in a close interval for repeat colonoscopy and consideration of capsule. See endo reports/OP note today. Cleared for discharge from a GI perspective.     Discharge progress note on 4/19/2021:  # Symptomatic anemia  # Acute iron deficient anemia   - HH 9.0 this am s/p 3 units prbc this admission   - Rectal exam neg for blood done by ED  - concern for malignancy - lost 39 lbs in one year   - CT of chest, abdomen and pelvis - no malignancy, wnl   - s/p EGD/colonoscopy on 4/16 with Dr. 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 colonoscopy - no mass lesions    - IV iron  - cannot have iron tid as she did not tolerate in the past and will follow up in 2 weeks with Dr. Nobles - D/W Dr. Nobles   - f/u CEA results outpatient with Dr. Nobles    Chest pain   - resolved, in the setting of acute anemia

## 2021-04-26 ENCOUNTER — EMERGENCY (EMERGENCY)
Facility: HOSPITAL | Age: 71
LOS: 0 days | Discharge: HOME | End: 2021-04-27
Attending: EMERGENCY MEDICINE
Payer: MEDICARE

## 2021-04-26 ENCOUNTER — NON-APPOINTMENT (OUTPATIENT)
Age: 71
End: 2021-04-26

## 2021-04-26 ENCOUNTER — TRANSCRIPTION ENCOUNTER (OUTPATIENT)
Age: 71
End: 2021-04-26

## 2021-04-26 VITALS
HEART RATE: 83 BPM | DIASTOLIC BLOOD PRESSURE: 71 MMHG | SYSTOLIC BLOOD PRESSURE: 136 MMHG | OXYGEN SATURATION: 99 % | RESPIRATION RATE: 16 BRPM | TEMPERATURE: 98 F

## 2021-04-26 VITALS — HEIGHT: 62 IN | WEIGHT: 149.91 LBS

## 2021-04-26 DIAGNOSIS — Z90.49 ACQUIRED ABSENCE OF OTHER SPECIFIED PARTS OF DIGESTIVE TRACT: ICD-10-CM

## 2021-04-26 DIAGNOSIS — Z88.5 ALLERGY STATUS TO NARCOTIC AGENT: ICD-10-CM

## 2021-04-26 DIAGNOSIS — Z90.49 ACQUIRED ABSENCE OF OTHER SPECIFIED PARTS OF DIGESTIVE TRACT: Chronic | ICD-10-CM

## 2021-04-26 DIAGNOSIS — I10 ESSENTIAL (PRIMARY) HYPERTENSION: ICD-10-CM

## 2021-04-26 DIAGNOSIS — Z98.890 OTHER SPECIFIED POSTPROCEDURAL STATES: ICD-10-CM

## 2021-04-26 DIAGNOSIS — Z86.12 PERSONAL HISTORY OF POLIOMYELITIS: ICD-10-CM

## 2021-04-26 DIAGNOSIS — Z88.0 ALLERGY STATUS TO PENICILLIN: ICD-10-CM

## 2021-04-26 DIAGNOSIS — J45.909 UNSPECIFIED ASTHMA, UNCOMPLICATED: ICD-10-CM

## 2021-04-26 DIAGNOSIS — F17.200 NICOTINE DEPENDENCE, UNSPECIFIED, UNCOMPLICATED: ICD-10-CM

## 2021-04-26 DIAGNOSIS — R19.7 DIARRHEA, UNSPECIFIED: ICD-10-CM

## 2021-04-26 DIAGNOSIS — Z79.84 LONG TERM (CURRENT) USE OF ORAL HYPOGLYCEMIC DRUGS: ICD-10-CM

## 2021-04-26 DIAGNOSIS — Z88.2 ALLERGY STATUS TO SULFONAMIDES: ICD-10-CM

## 2021-04-26 DIAGNOSIS — E11.9 TYPE 2 DIABETES MELLITUS WITHOUT COMPLICATIONS: ICD-10-CM

## 2021-04-26 DIAGNOSIS — R10.9 UNSPECIFIED ABDOMINAL PAIN: ICD-10-CM

## 2021-04-26 DIAGNOSIS — Z95.0 PRESENCE OF CARDIAC PACEMAKER: ICD-10-CM

## 2021-04-26 DIAGNOSIS — K62.5 HEMORRHAGE OF ANUS AND RECTUM: ICD-10-CM

## 2021-04-26 LAB
ALBUMIN SERPL ELPH-MCNC: 3.5 G/DL — SIGNIFICANT CHANGE UP (ref 3.3–5)
ALP SERPL-CCNC: 71 U/L — SIGNIFICANT CHANGE UP (ref 40–120)
ALT FLD-CCNC: 13 U/L — SIGNIFICANT CHANGE UP (ref 12–78)
ANION GAP SERPL CALC-SCNC: 3 MMOL/L — LOW (ref 5–17)
APTT BLD: 34.4 SEC — SIGNIFICANT CHANGE UP (ref 27.5–35.5)
AST SERPL-CCNC: 15 U/L — SIGNIFICANT CHANGE UP (ref 15–37)
BASOPHILS # BLD AUTO: 0.03 K/UL
BASOPHILS # BLD AUTO: 0.04 K/UL — SIGNIFICANT CHANGE UP (ref 0–0.2)
BASOPHILS NFR BLD AUTO: 0.3 %
BASOPHILS NFR BLD AUTO: 0.4 % — SIGNIFICANT CHANGE UP (ref 0–2)
BILIRUB SERPL-MCNC: 0.3 MG/DL — SIGNIFICANT CHANGE UP (ref 0.2–1.2)
BUN SERPL-MCNC: 21 MG/DL — SIGNIFICANT CHANGE UP (ref 7–23)
CALCIUM SERPL-MCNC: 9.1 MG/DL — SIGNIFICANT CHANGE UP (ref 8.5–10.1)
CHLORIDE SERPL-SCNC: 110 MMOL/L — HIGH (ref 96–108)
CO2 SERPL-SCNC: 25 MMOL/L — SIGNIFICANT CHANGE UP (ref 22–31)
CREAT SERPL-MCNC: 0.7 MG/DL — SIGNIFICANT CHANGE UP (ref 0.5–1.3)
EOSINOPHIL # BLD AUTO: 0.26 K/UL
EOSINOPHIL # BLD AUTO: 0.28 K/UL — SIGNIFICANT CHANGE UP (ref 0–0.5)
EOSINOPHIL NFR BLD AUTO: 2.7 % — SIGNIFICANT CHANGE UP (ref 0–6)
EOSINOPHIL NFR BLD AUTO: 2.9 %
GLUCOSE SERPL-MCNC: 84 MG/DL — SIGNIFICANT CHANGE UP (ref 70–99)
HCT VFR BLD CALC: 34.3 % — LOW (ref 34.5–45)
HCT VFR BLD CALC: 38.4 %
HGB BLD-MCNC: 10.5 G/DL — LOW (ref 11.5–15.5)
HGB BLD-MCNC: 11 G/DL
IMM GRANULOCYTES NFR BLD AUTO: 0.2 % — SIGNIFICANT CHANGE UP (ref 0–1.5)
IMM GRANULOCYTES NFR BLD AUTO: 0.6 %
INR BLD: 1.11 RATIO — SIGNIFICANT CHANGE UP (ref 0.88–1.16)
LYMPHOCYTES # BLD AUTO: 2.33 K/UL
LYMPHOCYTES # BLD AUTO: 2.63 K/UL — SIGNIFICANT CHANGE UP (ref 1–3.3)
LYMPHOCYTES # BLD AUTO: 25 % — SIGNIFICANT CHANGE UP (ref 13–44)
LYMPHOCYTES NFR BLD AUTO: 25.9 %
MAN DIFF?: NORMAL
MCHC RBC-ENTMCNC: 23.4 PG
MCHC RBC-ENTMCNC: 24.6 PG — LOW (ref 27–34)
MCHC RBC-ENTMCNC: 28.6 GM/DL
MCHC RBC-ENTMCNC: 30.6 GM/DL — LOW (ref 32–36)
MCV RBC AUTO: 80.5 FL — SIGNIFICANT CHANGE UP (ref 80–100)
MCV RBC AUTO: 81.5 FL
MONOCYTES # BLD AUTO: 0.6 K/UL
MONOCYTES # BLD AUTO: 0.69 K/UL — SIGNIFICANT CHANGE UP (ref 0–0.9)
MONOCYTES NFR BLD AUTO: 6.5 % — SIGNIFICANT CHANGE UP (ref 2–14)
MONOCYTES NFR BLD AUTO: 6.7 %
NEUTROPHILS # BLD AUTO: 5.72 K/UL
NEUTROPHILS # BLD AUTO: 6.88 K/UL — SIGNIFICANT CHANGE UP (ref 1.8–7.4)
NEUTROPHILS NFR BLD AUTO: 63.6 %
NEUTROPHILS NFR BLD AUTO: 65.2 % — SIGNIFICANT CHANGE UP (ref 43–77)
PLATELET # BLD AUTO: 270 K/UL — SIGNIFICANT CHANGE UP (ref 150–400)
PLATELET # BLD AUTO: 360 K/UL
POTASSIUM SERPL-MCNC: 4.1 MMOL/L — SIGNIFICANT CHANGE UP (ref 3.5–5.3)
POTASSIUM SERPL-SCNC: 4.1 MMOL/L — SIGNIFICANT CHANGE UP (ref 3.5–5.3)
PROT SERPL-MCNC: 7.9 GM/DL — SIGNIFICANT CHANGE UP (ref 6–8.3)
PROTHROM AB SERPL-ACNC: 12.8 SEC — SIGNIFICANT CHANGE UP (ref 10.6–13.6)
RBC # BLD: 4.26 M/UL — SIGNIFICANT CHANGE UP (ref 3.8–5.2)
RBC # BLD: 4.71 M/UL
RBC # FLD: 26.7 %
RBC # FLD: 28.5 % — HIGH (ref 10.3–14.5)
SODIUM SERPL-SCNC: 138 MMOL/L — SIGNIFICANT CHANGE UP (ref 135–145)
WBC # BLD: 10.54 K/UL — HIGH (ref 3.8–10.5)
WBC # FLD AUTO: 10.54 K/UL — HIGH (ref 3.8–10.5)
WBC # FLD AUTO: 8.99 K/UL

## 2021-04-26 PROCEDURE — 74177 CT ABD & PELVIS W/CONTRAST: CPT

## 2021-04-26 PROCEDURE — 86870 RBC ANTIBODY IDENTIFICATION: CPT

## 2021-04-26 PROCEDURE — 99284 EMERGENCY DEPT VISIT MOD MDM: CPT | Mod: 25

## 2021-04-26 PROCEDURE — 86901 BLOOD TYPING SEROLOGIC RH(D): CPT

## 2021-04-26 PROCEDURE — 85025 COMPLETE CBC W/AUTO DIFF WBC: CPT

## 2021-04-26 PROCEDURE — 86850 RBC ANTIBODY SCREEN: CPT

## 2021-04-26 PROCEDURE — 93005 ELECTROCARDIOGRAM TRACING: CPT

## 2021-04-26 PROCEDURE — 85610 PROTHROMBIN TIME: CPT

## 2021-04-26 PROCEDURE — 86900 BLOOD TYPING SEROLOGIC ABO: CPT

## 2021-04-26 PROCEDURE — 99285 EMERGENCY DEPT VISIT HI MDM: CPT

## 2021-04-26 PROCEDURE — 85730 THROMBOPLASTIN TIME PARTIAL: CPT

## 2021-04-26 PROCEDURE — 36415 COLL VENOUS BLD VENIPUNCTURE: CPT

## 2021-04-26 PROCEDURE — 80053 COMPREHEN METABOLIC PANEL: CPT

## 2021-04-26 PROCEDURE — 86880 COOMBS TEST DIRECT: CPT

## 2021-04-26 PROCEDURE — 93010 ELECTROCARDIOGRAM REPORT: CPT

## 2021-04-26 RX ORDER — SODIUM CHLORIDE 9 MG/ML
1000 INJECTION INTRAMUSCULAR; INTRAVENOUS; SUBCUTANEOUS ONCE
Refills: 0 | Status: COMPLETED | OUTPATIENT
Start: 2021-04-26 | End: 2021-04-26

## 2021-04-26 RX ADMIN — SODIUM CHLORIDE 2000 MILLILITER(S): 9 INJECTION INTRAMUSCULAR; INTRAVENOUS; SUBCUTANEOUS at 22:01

## 2021-04-26 NOTE — ED STATDOCS - NS_ ATTENDINGSCRIBEDETAILS _ED_A_ED_FT
I, Taryn Amaya MD, performed the initial face to face bedside interview with this patient regarding history of present illness and determined that the patient should be seen in the main ED.  The history, was documented by the scribe in my presence and I attest to the accuracy of the documentation.

## 2021-04-26 NOTE — ED ADULT NURSE NOTE - OBJECTIVE STATEMENT
pt came in with bloody stools x 1 at home. she has a hx of GI bleed and says that a week ago she had a colonoscopy and 3 blood transfusions. she also states that she smoked week this AM and is still feeling the effects

## 2021-04-26 NOTE — ED PROVIDER NOTE - PATIENT PORTAL LINK FT
You can access the FollowMyHealth Patient Portal offered by [facility] by registering at the following website: [email or web address]. By joining "[employer]’s FollowMyHealth portal, you will also be able to view your health information using other applications (apps) compatible with our system.

## 2021-04-26 NOTE — ED PROVIDER NOTE - CLINICAL SUMMARY MEDICAL DECISION MAKING FREE TEXT BOX
71 yo female presents with rectal bleed that started today. Pt was d/c last week from [facility] ED due to a previous rectal bleed that had to be cauterized. WIll check labs, CT, IVF, and reeval. -Gallito Vieira PA-C

## 2021-04-26 NOTE — ED PROVIDER NOTE - PHYSICAL EXAMINATION
VIKKI Marshall MD:  Gen'l: elderly AA F, alert, no respiratory discomfort, NAD  Eyes: PERRL, EOMI, no conjunctival pallor  ENT: O/P cleear, mm mildly dry  CV: RRR, normal radial pulse.  Lungs: normal respirations, CTA.  Abd: soft, no focal abd tenderness, BS+.  Exts: DAVILA x 4, no focal swelling, tenderness.  Skin: no tactile warmth, rash nor pallor noted.  : deferred, no flank/CVAT.  Neuro: A+O x 3, CNs grossly normal, normal speech, no focal extremity motor/sensory deficits.

## 2021-04-26 NOTE — ED STATDOCS - PROGRESS NOTE DETAILS
Romero JOAQUIN for ED attending, Dr. Amaya: 71 y/o F with PMHx of DM, HTN, asthma, polio, s/p pacemaker insertion, s/p cholecystectomy, and s/p recent admission to  4/14-4/19 regarding anemia and chest pain during which she was given 3 units PRBC and s/p EGD/colonoscopy on 4/16 with Dr. 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 Has had 3 episodes today. Notes some associated +R sided abd pain. No chest pain, SOB, dizziness, or fever. Not on AC. Current smoker. Allergic to morphine, penicillin, and sulfa drugs. PCP: Dr. Zonia Wynn. Will send pt to main ED for further evaluation.

## 2021-04-26 NOTE — ED PROVIDER NOTE - ATTENDING CONTRIBUTION TO CARE
I, Jerrell Marshall MD, personally saw the patient with the PA, and completed the key components of the history and physical exam. I then discussed the management plan with the PA.

## 2021-04-26 NOTE — ED PROVIDER NOTE - NSFOLLOWUPINSTRUCTIONS_ED_ALL_ED_FT
Continue your regular medications as per routine.  Follow up with your own regular doctor & GI doctor this week: call office later today.  Return to ED if bad abdominal pains, or bad bleeding from rectum.

## 2021-04-26 NOTE — ED ADULT TRIAGE NOTE - CHIEF COMPLAINT QUOTE
Patient presents to ED complaining of blood in her stools x several days. Pt describes the stool as looking like "meat". Pt was recently admitted to  for blood transfusions. Sent to ED by PCP for further workup. Pt denies chest pain, SOB, dizziness.

## 2021-04-26 NOTE — ED PROVIDER NOTE - PROGRESS NOTE DETAILS
MD Jorge:  Results of albs & CT d/w pt, who expressed her understanding, no active abd pain nor any BMs, BPR while in ED.  Pt agreeable with D/C home, outpt GI f/u this week. MD Jorge:  Results of labs & CT (Hgb stable, CT w/o e/o active bleeding) d/w pt, who expressed her understanding, no active abd pain nor any BMs, BPR while in ED.  Pt agreeable with D/C home, outpt GI f/u this week. 69 y/o F with PMHx of DM, HTN, asthma, polio, s/p pacemaker insertion, s/p cholecystectomy, and s/p recent admission to  4/14-4/19 regarding anemia and chest pain during which she was given 3 units PRBC and s/p EGD/colonoscopy on 4/16 with Dr. 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 Notes some associated +R sided abd pain. No chest pain, SOB, dizziness, or fever. Not on AC. Current smoker. While in ED, no abd pain nor D.  Allergic to morphine, penicillin, and sulfa drugs. PCP: Dr. Zonia Wynn.

## 2021-04-26 NOTE — ED CLERICAL - NS ED CLERK NOTE PRE-ARRIVAL INFORMATION; ADDITIONAL PRE-ARRIVAL INFORMATION

## 2021-04-26 NOTE — ED PROVIDER NOTE - OBJECTIVE STATEMENT
69 yo female with a PMH of htn, dm, asthma, polio presents with rectal bleed that started this morning. Pt states that she has been having bloody stool all morning with her diarrhea and spoke with her PMD to come back to the ER. Pt was seen and d/c last week for a GI bleed and needed 3 units of blood, had a colonoscopy which showed a bleed in the small intestines. Denies fever, cp, sob, vomiting, cp, sob, dizziness, lightheaded.

## 2021-04-27 ENCOUNTER — APPOINTMENT (OUTPATIENT)
Dept: HOME HEALTH SERVICES | Facility: HOME HEALTH | Age: 71
End: 2021-04-27

## 2021-04-27 PROCEDURE — 74177 CT ABD & PELVIS W/CONTRAST: CPT | Mod: 26

## 2021-04-27 PROCEDURE — 86077 PHYS BLOOD BANK SERV XMATCH: CPT

## 2021-04-29 ENCOUNTER — NON-APPOINTMENT (OUTPATIENT)
Age: 71
End: 2021-04-29

## 2021-04-30 PROBLEM — Z60.2 PERSON LIVING ALONE: Status: ACTIVE | Noted: 2021-04-30

## 2021-04-30 PROBLEM — Z23 NEED FOR COVID-19 VACCINE: Status: RESOLVED | Noted: 2021-03-19 | Resolved: 2021-04-30

## 2021-04-30 PROBLEM — M79.603 ARM PAIN: Status: RESOLVED | Noted: 2021-04-07 | Resolved: 2021-04-30

## 2021-04-30 PROBLEM — Z92.89 HISTORY OF BLOOD TRANSFUSION: Status: RESOLVED | Noted: 2021-04-30 | Resolved: 2021-04-30

## 2021-04-30 PROBLEM — Z99.3 WHEELCHAIR BOUND: Status: ACTIVE | Noted: 2021-04-30

## 2021-04-30 PROBLEM — L60.3 ONYCHODYSTROPHY: Status: RESOLVED | Noted: 2020-12-08 | Resolved: 2021-04-30

## 2021-04-30 PROBLEM — Z87.19 HISTORY OF GASTROINTESTINAL HEMORRHAGE: Status: RESOLVED | Noted: 2021-04-30 | Resolved: 2021-04-30

## 2021-04-30 PROBLEM — J45.909 ASTHMA: Status: ACTIVE | Noted: 2021-04-30

## 2021-04-30 PROBLEM — A80.9 POLIO: Status: RESOLVED | Noted: 2021-04-30 | Resolved: 2021-04-30

## 2021-04-30 PROBLEM — Z80.1 FAMILY HISTORY OF LUNG CANCER: Status: ACTIVE | Noted: 2021-04-30

## 2021-04-30 PROBLEM — Z71.89 ADVANCE CARE PLANNING: Status: RESOLVED | Noted: 2020-12-08 | Resolved: 2021-04-30

## 2021-04-30 PROBLEM — L29.9 ITCHY SCALP: Status: RESOLVED | Noted: 2021-04-07 | Resolved: 2021-04-30

## 2021-04-30 PROBLEM — Z82.49 FAMILY HISTORY OF VALVULAR HEART DISEASE: Status: ACTIVE | Noted: 2021-04-30

## 2021-04-30 PROBLEM — Z92.29 HISTORY OF INFLUENZA VACCINATION: Status: RESOLVED | Noted: 2020-12-08 | Resolved: 2021-04-30

## 2021-05-01 DIAGNOSIS — D62 ACUTE POSTHEMORRHAGIC ANEMIA: ICD-10-CM

## 2021-05-01 DIAGNOSIS — D50.9 IRON DEFICIENCY ANEMIA, UNSPECIFIED: ICD-10-CM

## 2021-05-01 DIAGNOSIS — Z82.49 FAMILY HISTORY OF ISCHEMIC HEART DISEASE AND OTHER DISEASES OF THE CIRCULATORY SYSTEM: ICD-10-CM

## 2021-05-01 DIAGNOSIS — F17.210 NICOTINE DEPENDENCE, CIGARETTES, UNCOMPLICATED: ICD-10-CM

## 2021-05-01 DIAGNOSIS — Z88.2 ALLERGY STATUS TO SULFONAMIDES: ICD-10-CM

## 2021-05-01 DIAGNOSIS — Z80.1 FAMILY HISTORY OF MALIGNANT NEOPLASM OF TRACHEA, BRONCHUS AND LUNG: ICD-10-CM

## 2021-05-01 DIAGNOSIS — I10 ESSENTIAL (PRIMARY) HYPERTENSION: ICD-10-CM

## 2021-05-01 DIAGNOSIS — K31.811 ANGIODYSPLASIA OF STOMACH AND DUODENUM WITH BLEEDING: ICD-10-CM

## 2021-05-01 DIAGNOSIS — Z90.710 ACQUIRED ABSENCE OF BOTH CERVIX AND UTERUS: ICD-10-CM

## 2021-05-01 DIAGNOSIS — Z95.0 PRESENCE OF CARDIAC PACEMAKER: ICD-10-CM

## 2021-05-01 DIAGNOSIS — Z99.3 DEPENDENCE ON WHEELCHAIR: ICD-10-CM

## 2021-05-01 DIAGNOSIS — Z88.0 ALLERGY STATUS TO PENICILLIN: ICD-10-CM

## 2021-05-01 DIAGNOSIS — I25.10 ATHEROSCLEROTIC HEART DISEASE OF NATIVE CORONARY ARTERY WITHOUT ANGINA PECTORIS: ICD-10-CM

## 2021-05-01 DIAGNOSIS — M89.60 OSTEOPATHY AFTER POLIOMYELITIS, UNSPECIFIED SITE: ICD-10-CM

## 2021-05-01 DIAGNOSIS — J45.909 UNSPECIFIED ASTHMA, UNCOMPLICATED: ICD-10-CM

## 2021-05-01 DIAGNOSIS — Z79.82 LONG TERM (CURRENT) USE OF ASPIRIN: ICD-10-CM

## 2021-05-01 DIAGNOSIS — E11.9 TYPE 2 DIABETES MELLITUS WITHOUT COMPLICATIONS: ICD-10-CM

## 2021-05-01 DIAGNOSIS — Z90.49 ACQUIRED ABSENCE OF OTHER SPECIFIED PARTS OF DIGESTIVE TRACT: ICD-10-CM

## 2021-05-06 ENCOUNTER — OUTPATIENT (OUTPATIENT)
Dept: OUTPATIENT SERVICES | Facility: HOSPITAL | Age: 71
LOS: 1 days | Discharge: HOME | End: 2021-05-06

## 2021-05-06 ENCOUNTER — NON-APPOINTMENT (OUTPATIENT)
Age: 71
End: 2021-05-06

## 2021-05-06 DIAGNOSIS — Z90.49 ACQUIRED ABSENCE OF OTHER SPECIFIED PARTS OF DIGESTIVE TRACT: Chronic | ICD-10-CM

## 2021-05-06 DIAGNOSIS — D50.9 IRON DEFICIENCY ANEMIA, UNSPECIFIED: ICD-10-CM

## 2021-05-07 ENCOUNTER — RESULT REVIEW (OUTPATIENT)
Age: 71
End: 2021-05-07

## 2021-05-07 ENCOUNTER — APPOINTMENT (OUTPATIENT)
Dept: INFUSION THERAPY | Facility: CLINIC | Age: 71
End: 2021-05-07
Payer: MEDICARE

## 2021-05-07 ENCOUNTER — APPOINTMENT (OUTPATIENT)
Dept: HEMATOLOGY ONCOLOGY | Facility: CLINIC | Age: 71
End: 2021-05-07
Payer: MEDICARE

## 2021-05-07 ENCOUNTER — NON-APPOINTMENT (OUTPATIENT)
Age: 71
End: 2021-05-07

## 2021-05-07 ENCOUNTER — APPOINTMENT (OUTPATIENT)
Dept: HOME HEALTH SERVICES | Facility: HOME HEALTH | Age: 71
End: 2021-05-07

## 2021-05-07 VITALS
WEIGHT: 168 LBS | DIASTOLIC BLOOD PRESSURE: 75 MMHG | HEART RATE: 90 BPM | SYSTOLIC BLOOD PRESSURE: 124 MMHG | TEMPERATURE: 98.8 F | BODY MASS INDEX: 30.73 KG/M2

## 2021-05-07 DIAGNOSIS — M79.603 PAIN IN ARM, UNSPECIFIED: ICD-10-CM

## 2021-05-07 DIAGNOSIS — Z99.3 DEPENDENCE ON WHEELCHAIR: ICD-10-CM

## 2021-05-07 DIAGNOSIS — Z80.1 FAMILY HISTORY OF MALIGNANT NEOPLASM OF TRACHEA, BRONCHUS AND LUNG: ICD-10-CM

## 2021-05-07 DIAGNOSIS — Z87.19 PERSONAL HISTORY OF OTHER DISEASES OF THE DIGESTIVE SYSTEM: ICD-10-CM

## 2021-05-07 DIAGNOSIS — Z23 ENCOUNTER FOR IMMUNIZATION: ICD-10-CM

## 2021-05-07 DIAGNOSIS — Z82.49 FAMILY HISTORY OF ISCHEMIC HEART DISEASE AND OTHER DISEASES OF THE CIRCULATORY SYSTEM: ICD-10-CM

## 2021-05-07 DIAGNOSIS — L29.9 PRURITUS, UNSPECIFIED: ICD-10-CM

## 2021-05-07 DIAGNOSIS — K55.20 ANGIODYSPLASIA OF COLON W/OUT HEMORRHAGE: ICD-10-CM

## 2021-05-07 DIAGNOSIS — A80.9 ACUTE POLIOMYELITIS, UNSPECIFIED: ICD-10-CM

## 2021-05-07 DIAGNOSIS — Z60.2 PROBLEMS RELATED TO LIVING ALONE: ICD-10-CM

## 2021-05-07 DIAGNOSIS — Z92.29 PERSONAL HISTORY OF OTHER DRUG THERAPY: ICD-10-CM

## 2021-05-07 DIAGNOSIS — J45.909 UNSPECIFIED ASTHMA, UNCOMPLICATED: ICD-10-CM

## 2021-05-07 DIAGNOSIS — L60.3 NAIL DYSTROPHY: ICD-10-CM

## 2021-05-07 DIAGNOSIS — Z71.89 OTHER SPECIFIED COUNSELING: ICD-10-CM

## 2021-05-07 DIAGNOSIS — Z92.89 PERSONAL HISTORY OF OTHER MEDICAL TREATMENT: ICD-10-CM

## 2021-05-07 PROCEDURE — 99214 OFFICE O/P EST MOD 30 MIN: CPT

## 2021-05-07 SDOH — SOCIAL STABILITY - SOCIAL INSECURITY: PROBLEMS RELATED TO LIVING ALONE: Z60.2

## 2021-05-07 NOTE — HISTORY OF PRESENT ILLNESS
[de-identified] : 4/14/21 - Admitted to  with Hgb 6.2, MCV 71.1.  Ferritin 9, TSAT 2%.  Patient had lost 39 pounds in 1 year.  Recent constipation. \par 4/15/21 - CT C/A/P - ess WNL, no malignancy. \par 4/16/21 - EGD/Colonoscopy (Dr. 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  Colonoscopy - no mass lesions. \par Had 3U PRBC's and one dose of Venofer 200mg.   Hgb at discharge 9.0gm/dl, MCV 76.4. \par \par 4/26/21 - Went back to ER as she had 3 "bloody stool all morning" with diarrhea.   Hgb 10.5, MCV 80.5. \par CT A/P - No evidence of active contrast extravasation into the lumen of the GI tract.  INdeterminant 1.5cm nodular hyper enhancing focus in the liver on arterial phase images - ? hemangioma.  9mm left adrenal nodule likely adenoma.  Nonspecific left breast findings stable from 3/20/19.   \par Patient was discharged. \par

## 2021-05-07 NOTE — REASON FOR VISIT
[Follow-Up Visit] : a follow-up visit for [FreeTextEntry2] : iron deficiency anemia  follow up HH consultation

## 2021-05-07 NOTE — HISTORY OF PRESENT ILLNESS
[de-identified] : 4/14/21 - Admitted to  with Hgb 6.2, MCV 71.1.  Ferritin 9, TSAT 2%.  Patient had lost 39 pounds in 1 year.  Recent constipation. \par 4/15/21 - CT C/A/P - ess WNL, no malignancy. \par 4/16/21 - EGD/Colonoscopy (Dr. 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  Colonoscopy - no mass lesions. \par Had 3U PRBC's and one dose of Venofer 200mg.   Hgb at discharge 9.0gm/dl, MCV 76.4. \par \par 4/26/21 - Went back to ER as she had 3 "bloody stool all morning" with diarrhea.   Hgb 10.5, MCV 80.5. \par CT A/P - No evidence of active contrast extravasation into the lumen of the GI tract.  INdeterminant 1.5cm nodular hyper enhancing focus in the liver on arterial phase images - ? hemangioma.  9mm left adrenal nodule likely adenoma.  Nonspecific left breast findings stable from 3/20/19.   \par Patient was discharged. \par

## 2021-05-10 ENCOUNTER — APPOINTMENT (OUTPATIENT)
Dept: GASTROENTEROLOGY | Facility: CLINIC | Age: 71
End: 2021-05-10
Payer: MEDICARE

## 2021-05-10 VITALS
HEIGHT: 62 IN | WEIGHT: 168 LBS | HEART RATE: 82 BPM | SYSTOLIC BLOOD PRESSURE: 136 MMHG | DIASTOLIC BLOOD PRESSURE: 72 MMHG | BODY MASS INDEX: 30.91 KG/M2

## 2021-05-10 DIAGNOSIS — D50.9 IRON DEFICIENCY ANEMIA, UNSPECIFIED: ICD-10-CM

## 2021-05-10 PROCEDURE — 99213 OFFICE O/P EST LOW 20 MIN: CPT

## 2021-05-10 PROCEDURE — 99072 ADDL SUPL MATRL&STAF TM PHE: CPT

## 2021-05-10 NOTE — HISTORY OF PRESENT ILLNESS
[de-identified] : 70 year old woman wheelchair bound from polio, pacemaker for heart block, recently admitted with symptomatic anemia found to have AVM's in proximal small bowel with improvement in hgb/iron as outpatient on infusions, here for follow up. \par \par About two weeks ago had a severe bout of diarrhea without nausea or vomiting that had some blood mixed in it. Denied covid exposure or travel history, no uncooked food. Has been fine since and is back to her normal solid brown BM's. No other acute  complaints, good appetite, eating well. \par \par She is seeing Dr. Nobles as outpatient and is on IV iron infusions with good improvement in hgb.

## 2021-05-10 NOTE — PHYSICAL EXAM
[Sclera] : the sclera and conjunctiva were normal [PERRL With Normal Accommodation] : pupils were equal in size, round, and reactive to light [Extraocular Movements] : extraocular movements were intact [Abdomen Soft] : soft [FreeTextEntry1] : wheelchair bound, upper extremities move without  issue [Skin Color & Pigmentation] : normal skin color and pigmentation [Skin Turgor] : normal skin turgor [] : no rash [Oriented To Time, Place, And Person] : oriented to person, place, and time [Impaired Insight] : insight and judgment were intact [Affect] : the affect was normal

## 2021-05-10 NOTE — ASSESSMENT
[FreeTextEntry1] : 70 year old woman wheelchair bound from polio, pacemaker for heart block, recently admitted with symptomatic anemia found to have AVM's in proximal small bowel with improvement in hgb/iron as outpatient on infusions, here for follow up. \par \par In terms of her acute illnees, she likely had a viral or bacterial enteritis that quickly improved. She currently doesn't have diarrhea or overt signs of bleeding. Her colonoscopy prep was not adequate but she did not have a large mass lesion or large polyp on it. She needs repeat anyway so we are booking a colonoscopy. She prefers after July 4th and I think that is ok. That will give us enough time to see if her hgb holds to see if there is utility in repeat ing endoscopy but bryon don't need to. In terms of her anemia, she is improving nicely. Hgb went from 6 to 10 and is holding steady. Agree with Dr. Nobles's plan. \par

## 2021-05-14 ENCOUNTER — APPOINTMENT (OUTPATIENT)
Dept: HOME HEALTH SERVICES | Facility: HOME HEALTH | Age: 71
End: 2021-05-14

## 2021-05-14 ENCOUNTER — APPOINTMENT (OUTPATIENT)
Dept: INFUSION THERAPY | Facility: CLINIC | Age: 71
End: 2021-05-14

## 2021-05-14 VITALS — TEMPERATURE: 97.8 F | HEART RATE: 97 BPM | SYSTOLIC BLOOD PRESSURE: 128 MMHG | DIASTOLIC BLOOD PRESSURE: 70 MMHG

## 2021-05-21 ENCOUNTER — APPOINTMENT (OUTPATIENT)
Dept: INFUSION THERAPY | Facility: CLINIC | Age: 71
End: 2021-05-21

## 2021-05-21 VITALS — HEART RATE: 84 BPM | TEMPERATURE: 98 F | SYSTOLIC BLOOD PRESSURE: 138 MMHG | DIASTOLIC BLOOD PRESSURE: 82 MMHG

## 2021-05-25 ENCOUNTER — APPOINTMENT (OUTPATIENT)
Dept: INFUSION THERAPY | Facility: CLINIC | Age: 71
End: 2021-05-25

## 2021-05-25 VITALS — TEMPERATURE: 97.7 F | SYSTOLIC BLOOD PRESSURE: 147 MMHG | HEART RATE: 83 BPM | DIASTOLIC BLOOD PRESSURE: 72 MMHG

## 2021-06-09 ENCOUNTER — OUTPATIENT (OUTPATIENT)
Dept: OUTPATIENT SERVICES | Facility: HOSPITAL | Age: 71
LOS: 1 days | Discharge: HOME | End: 2021-06-09

## 2021-06-09 DIAGNOSIS — Z90.49 ACQUIRED ABSENCE OF OTHER SPECIFIED PARTS OF DIGESTIVE TRACT: Chronic | ICD-10-CM

## 2021-06-09 DIAGNOSIS — D50.9 IRON DEFICIENCY ANEMIA, UNSPECIFIED: ICD-10-CM

## 2021-06-11 ENCOUNTER — APPOINTMENT (OUTPATIENT)
Dept: HEMATOLOGY ONCOLOGY | Facility: CLINIC | Age: 71
End: 2021-06-11

## 2021-06-11 ENCOUNTER — RESULT REVIEW (OUTPATIENT)
Age: 71
End: 2021-06-11

## 2021-06-11 LAB
BASOPHILS # BLD AUTO: 0.03 K/UL — SIGNIFICANT CHANGE UP (ref 0–0.2)
BASOPHILS NFR BLD AUTO: 0.4 % — SIGNIFICANT CHANGE UP (ref 0–2)
EOSINOPHIL # BLD AUTO: 0.16 K/UL — SIGNIFICANT CHANGE UP (ref 0–0.5)
EOSINOPHIL NFR BLD AUTO: 2.2 % — SIGNIFICANT CHANGE UP (ref 0–6)
HCT VFR BLD CALC: 38.4 % — SIGNIFICANT CHANGE UP (ref 34.5–45)
HGB BLD-MCNC: 12.2 G/DL — SIGNIFICANT CHANGE UP (ref 11.5–15.5)
IMM GRANULOCYTES NFR BLD AUTO: 0.3 % — SIGNIFICANT CHANGE UP (ref 0–1.5)
LYMPHOCYTES # BLD AUTO: 1.66 K/UL — SIGNIFICANT CHANGE UP (ref 1–3.3)
LYMPHOCYTES # BLD AUTO: 23.1 % — SIGNIFICANT CHANGE UP (ref 13–44)
MCHC RBC-ENTMCNC: 27.9 PG — SIGNIFICANT CHANGE UP (ref 27–34)
MCHC RBC-ENTMCNC: 31.8 GM/DL — LOW (ref 32–36)
MCV RBC AUTO: 87.7 FL — SIGNIFICANT CHANGE UP (ref 80–100)
MONOCYTES # BLD AUTO: 0.38 K/UL — SIGNIFICANT CHANGE UP (ref 0–0.9)
MONOCYTES NFR BLD AUTO: 5.3 % — SIGNIFICANT CHANGE UP (ref 2–14)
NEUTROPHILS # BLD AUTO: 4.93 K/UL — SIGNIFICANT CHANGE UP (ref 1.8–7.4)
NEUTROPHILS NFR BLD AUTO: 68.7 % — SIGNIFICANT CHANGE UP (ref 43–77)
NRBC # BLD: 0 /100 WBCS — SIGNIFICANT CHANGE UP (ref 0–0)
PLATELET # BLD AUTO: 206 K/UL — SIGNIFICANT CHANGE UP (ref 150–400)
RBC # BLD: 4.38 M/UL — SIGNIFICANT CHANGE UP (ref 3.8–5.2)
RBC # FLD: SIGNIFICANT CHANGE UP (ref 10.3–14.5)
WBC # BLD: 7.18 K/UL — SIGNIFICANT CHANGE UP (ref 3.8–10.5)
WBC # FLD AUTO: 7.18 K/UL — SIGNIFICANT CHANGE UP (ref 3.8–10.5)

## 2021-06-20 ENCOUNTER — APPOINTMENT (OUTPATIENT)
Dept: ELECTROPHYSIOLOGY | Facility: CLINIC | Age: 71
End: 2021-06-20

## 2021-06-24 ENCOUNTER — APPOINTMENT (OUTPATIENT)
Dept: ELECTROPHYSIOLOGY | Facility: CLINIC | Age: 71
End: 2021-06-24
Payer: MEDICARE

## 2021-06-24 ENCOUNTER — NON-APPOINTMENT (OUTPATIENT)
Age: 71
End: 2021-06-24

## 2021-06-24 PROCEDURE — 93296 REM INTERROG EVL PM/IDS: CPT

## 2021-06-24 PROCEDURE — 93294 REM INTERROG EVL PM/LDLS PM: CPT

## 2021-06-25 ENCOUNTER — RESULT REVIEW (OUTPATIENT)
Age: 71
End: 2021-06-25

## 2021-06-25 ENCOUNTER — APPOINTMENT (OUTPATIENT)
Dept: HEMATOLOGY ONCOLOGY | Facility: CLINIC | Age: 71
End: 2021-06-25

## 2021-06-25 LAB
BASOPHILS # BLD AUTO: 0.03 K/UL — SIGNIFICANT CHANGE UP (ref 0–0.2)
BASOPHILS NFR BLD AUTO: 0.4 % — SIGNIFICANT CHANGE UP (ref 0–2)
EOSINOPHIL # BLD AUTO: 0.24 K/UL — SIGNIFICANT CHANGE UP (ref 0–0.5)
EOSINOPHIL NFR BLD AUTO: 3.3 % — SIGNIFICANT CHANGE UP (ref 0–6)
HCT VFR BLD CALC: 35.8 % — SIGNIFICANT CHANGE UP (ref 34.5–45)
HGB BLD-MCNC: 11.4 G/DL — LOW (ref 11.5–15.5)
IMM GRANULOCYTES NFR BLD AUTO: 0.3 % — SIGNIFICANT CHANGE UP (ref 0–1.5)
LYMPHOCYTES # BLD AUTO: 1.66 K/UL — SIGNIFICANT CHANGE UP (ref 1–3.3)
LYMPHOCYTES # BLD AUTO: 23 % — SIGNIFICANT CHANGE UP (ref 13–44)
MCHC RBC-ENTMCNC: 28.8 PG — SIGNIFICANT CHANGE UP (ref 27–34)
MCHC RBC-ENTMCNC: 31.8 GM/DL — LOW (ref 32–36)
MCV RBC AUTO: 90.4 FL — SIGNIFICANT CHANGE UP (ref 80–100)
MONOCYTES # BLD AUTO: 0.42 K/UL — SIGNIFICANT CHANGE UP (ref 0–0.9)
MONOCYTES NFR BLD AUTO: 5.8 % — SIGNIFICANT CHANGE UP (ref 2–14)
NEUTROPHILS # BLD AUTO: 4.84 K/UL — SIGNIFICANT CHANGE UP (ref 1.8–7.4)
NEUTROPHILS NFR BLD AUTO: 67.2 % — SIGNIFICANT CHANGE UP (ref 43–77)
NRBC # BLD: 0 /100 WBCS — SIGNIFICANT CHANGE UP (ref 0–0)
PLATELET # BLD AUTO: 228 K/UL — SIGNIFICANT CHANGE UP (ref 150–400)
RBC # BLD: 3.96 M/UL — SIGNIFICANT CHANGE UP (ref 3.8–5.2)
RBC # FLD: SIGNIFICANT CHANGE UP (ref 10.3–14.5)
WBC # BLD: 7.21 K/UL — SIGNIFICANT CHANGE UP (ref 3.8–10.5)
WBC # FLD AUTO: 7.21 K/UL — SIGNIFICANT CHANGE UP (ref 3.8–10.5)

## 2021-06-26 LAB
FERRITIN SERPL-MCNC: 884 NG/ML — HIGH (ref 15–150)
IRON SATN MFR SERPL: 40 % — SIGNIFICANT CHANGE UP (ref 14–50)
IRON SATN MFR SERPL: 95 UG/DL — SIGNIFICANT CHANGE UP (ref 30–160)
TIBC SERPL-MCNC: 237 UG/DL — SIGNIFICANT CHANGE UP (ref 220–430)
UIBC SERPL-MCNC: 143 UG/DL — SIGNIFICANT CHANGE UP (ref 110–370)

## 2021-07-06 ENCOUNTER — NON-APPOINTMENT (OUTPATIENT)
Age: 71
End: 2021-07-06

## 2021-07-17 ENCOUNTER — TRANSCRIPTION ENCOUNTER (OUTPATIENT)
Age: 71
End: 2021-07-17

## 2021-07-17 ENCOUNTER — NON-APPOINTMENT (OUTPATIENT)
Age: 71
End: 2021-07-17

## 2021-07-17 DIAGNOSIS — R19.7 DIARRHEA, UNSPECIFIED: ICD-10-CM

## 2021-07-19 ENCOUNTER — NON-APPOINTMENT (OUTPATIENT)
Age: 71
End: 2021-07-19

## 2021-07-19 ENCOUNTER — LABORATORY RESULT (OUTPATIENT)
Age: 71
End: 2021-07-19

## 2021-07-20 ENCOUNTER — APPOINTMENT (OUTPATIENT)
Dept: HOME HEALTH SERVICES | Facility: HOME HEALTH | Age: 71
End: 2021-07-20
Payer: MEDICARE

## 2021-07-20 VITALS
DIASTOLIC BLOOD PRESSURE: 70 MMHG | HEART RATE: 81 BPM | OXYGEN SATURATION: 96 % | SYSTOLIC BLOOD PRESSURE: 120 MMHG | TEMPERATURE: 97.9 F | RESPIRATION RATE: 14 BRPM

## 2021-07-20 DIAGNOSIS — L21.9 SEBORRHEIC DERMATITIS, UNSPECIFIED: ICD-10-CM

## 2021-07-20 LAB
ALBUMIN SERPL ELPH-MCNC: 4.6 G/DL
ALP BLD-CCNC: 91 U/L
ALT SERPL-CCNC: 7 U/L
ANION GAP SERPL CALC-SCNC: 14 MMOL/L
AST SERPL-CCNC: 12 U/L
BASOPHILS # BLD AUTO: 0.03 K/UL
BASOPHILS NFR BLD AUTO: 0.5 %
BILIRUB SERPL-MCNC: <0.2 MG/DL
BUN SERPL-MCNC: 21 MG/DL
CALCIUM SERPL-MCNC: 9.7 MG/DL
CHLORIDE SERPL-SCNC: 105 MMOL/L
CO2 SERPL-SCNC: 23 MMOL/L
CREAT SERPL-MCNC: 0.9 MG/DL
EOSINOPHIL # BLD AUTO: 0.23 K/UL
EOSINOPHIL NFR BLD AUTO: 3.5 %
HCT VFR BLD CALC: 35.8 %
HGB BLD-MCNC: 11.5 G/DL
IMM GRANULOCYTES NFR BLD AUTO: 0.3 %
LPL SERPL-CCNC: 23 U/L
LYMPHOCYTES # BLD AUTO: 1.73 K/UL
LYMPHOCYTES NFR BLD AUTO: 26.6 %
MAN DIFF?: NORMAL
MCHC RBC-ENTMCNC: 30.9 PG
MCHC RBC-ENTMCNC: 32.1 GM/DL
MCV RBC AUTO: 96.2 FL
MONOCYTES # BLD AUTO: 0.45 K/UL
MONOCYTES NFR BLD AUTO: 6.9 %
NEUTROPHILS # BLD AUTO: 4.05 K/UL
NEUTROPHILS NFR BLD AUTO: 62.2 %
PLATELET # BLD AUTO: 236 K/UL
POTASSIUM SERPL-SCNC: 4.7 MMOL/L
PROT SERPL-MCNC: 7.2 G/DL
RBC # BLD: 3.72 M/UL
RBC # FLD: 22.3 %
SARS-COV-2 N GENE NPH QL NAA+PROBE: NOT DETECTED
SODIUM SERPL-SCNC: 142 MMOL/L
TSH SERPL-ACNC: 1.56 UIU/ML
WBC # FLD AUTO: 6.51 K/UL

## 2021-07-20 PROCEDURE — 99349 HOME/RES VST EST MOD MDM 40: CPT

## 2021-07-20 NOTE — COUNSELING
[Completed] : Aspirin use discussion completed [] : foot exam [Improve exercise tolerance] : improve exercise tolerance [Improve mobility] : improve mobility [Improve weight] : improve weight

## 2021-07-23 PROBLEM — L21.9 SEBORRHEIC DERMATITIS: Status: ACTIVE | Noted: 2021-07-20

## 2021-07-23 NOTE — HEALTH RISK ASSESSMENT
[HRA Reviewed] : Health risk assessment reviewed [Independent] : managing finances [Some assistance needed] : shopping [No falls in past year] : Patient reported no falls in the past year

## 2021-07-23 NOTE — REASON FOR VISIT
[Follow-Up] : a follow-up visit [Pre-Visit Preparation] : pre-visit preparation was done [Intercurrent Specialty/Sub-specialty Visits] : the patient has intercurrent specialty/sub-specialty visits [FreeTextEntry2] : chart review  [FreeTextEntry3] : GI, heme/onc

## 2021-07-23 NOTE — PHYSICAL EXAM
[Normal Sclera/Conjunctiva] : normal sclera/conjunctiva [Normal Outer Ear/Nose] : the ears and nose were normal in appearance [No JVD] : no jugular venous distention [No Respiratory Distress] : no respiratory distress [Soft] : abdomen soft [No Hernias] : no hernia was discovered [Patient Refused] : rectal exam was refused by the patient [No CVA Tenderness] : no ~M costovertebral angle tenderness [Kyphosis] :  kyphosis present [No Motor Deficits] : the motor exam was normal [Oriented x3] : oriented to person, place, and time [Normal Affect] : the affect was normal [No Accessory Muscle Use] : no accessory muscle use [Pedal Pulses Present] : the pedal pulses are present [de-identified] : obese  female  in wheelchair   [de-identified] : poor  dentition  [de-identified] : paced rhythm  [de-identified] : obese  [de-identified] : in wheelchair

## 2021-07-23 NOTE — HISTORY OF PRESENT ILLNESS
[Patient] : patient [FreeTextEntry1] : CAD  DM   pacemaker placed  obesity    wheelchair bound  [FreeTextEntry2] : 69 y/o female with history of CAD, DM, pacemaker, wheelchair bound. Patient is being seen today for routine follow-up of chronic conditions. \par  most of the history obtained from  patient \par \par  patient  still follows with   specialists  cardiologist   dermatologist \par \par diet regular  appetite  \par dysphagia none \par Weight claims to lost weight  \par constipation none \par incontinence none \par pressure/bed sores none \par Ambulates in wheelchair  \par falls no  none recently \par Mood good  \par \par  sleep  good \par sensory deficits  none \par \par Medication refills done and reconciliation  done \par  smoker   5 cigaretes a day  Extensive  counseling of  the patient and family about dangers of smoking \par The patient understands the long-term effects and the risks with smoking\par despite that unwilling to quit \par we will continue to encourage smoking cessation \par \par Patient denies fever, cough, trouble breathing, rash, vomiting and diarrhea. Patient has not been in close contact with someone covid positive. \par N95 mask, gloves, eye wear and gown used during visit: Y. Total face to face time with patient is 25min.\par \par \par

## 2021-07-23 NOTE — REVIEW OF SYSTEMS
[Fatigue] : fatigue [Back Pain] : back pain [Unsteady Walking] : ataxia [Itching] : Itching [Negative] : Genitourinary

## 2021-08-04 ENCOUNTER — APPOINTMENT (OUTPATIENT)
Dept: HOME HEALTH SERVICES | Facility: HOME HEALTH | Age: 71
End: 2021-08-04

## 2021-08-05 RX ORDER — BLOOD-GLUCOSE METER
W/DEVICE KIT MISCELLANEOUS
Qty: 1 | Refills: 0 | Status: DISCONTINUED | COMMUNITY
Start: 2021-07-20 | End: 2021-08-05

## 2021-08-05 RX ORDER — BLOOD SUGAR DIAGNOSTIC
STRIP MISCELLANEOUS DAILY
Qty: 1 | Refills: 0 | Status: DISCONTINUED | COMMUNITY
Start: 2021-07-20 | End: 2021-08-05

## 2021-08-06 ENCOUNTER — APPOINTMENT (OUTPATIENT)
Dept: GASTROENTEROLOGY | Facility: HOSPITAL | Age: 71
End: 2021-08-06

## 2021-08-06 ENCOUNTER — NON-APPOINTMENT (OUTPATIENT)
Age: 71
End: 2021-08-06

## 2021-09-02 ENCOUNTER — OUTPATIENT (OUTPATIENT)
Dept: OUTPATIENT SERVICES | Facility: HOSPITAL | Age: 71
LOS: 1 days | Discharge: HOME | End: 2021-09-02

## 2021-09-02 DIAGNOSIS — D50.9 IRON DEFICIENCY ANEMIA, UNSPECIFIED: ICD-10-CM

## 2021-09-02 DIAGNOSIS — Z90.49 ACQUIRED ABSENCE OF OTHER SPECIFIED PARTS OF DIGESTIVE TRACT: Chronic | ICD-10-CM

## 2021-09-03 ENCOUNTER — RESULT REVIEW (OUTPATIENT)
Age: 71
End: 2021-09-03

## 2021-09-03 ENCOUNTER — APPOINTMENT (OUTPATIENT)
Dept: HEMATOLOGY ONCOLOGY | Facility: CLINIC | Age: 71
End: 2021-09-03

## 2021-09-03 LAB
BASOPHILS # BLD AUTO: 0.03 K/UL — SIGNIFICANT CHANGE UP (ref 0–0.2)
BASOPHILS NFR BLD AUTO: 0.5 % — SIGNIFICANT CHANGE UP (ref 0–2)
EOSINOPHIL # BLD AUTO: 0.18 K/UL — SIGNIFICANT CHANGE UP (ref 0–0.5)
EOSINOPHIL NFR BLD AUTO: 3.1 % — SIGNIFICANT CHANGE UP (ref 0–6)
FERRITIN SERPL-MCNC: 669 NG/ML — HIGH (ref 15–150)
HCT VFR BLD CALC: 36.8 % — SIGNIFICANT CHANGE UP (ref 34.5–45)
HGB BLD-MCNC: 12 G/DL — SIGNIFICANT CHANGE UP (ref 11.5–15.5)
IMM GRANULOCYTES NFR BLD AUTO: 0.2 % — SIGNIFICANT CHANGE UP (ref 0–1.5)
IRON SATN MFR SERPL: 105 UG/DL — SIGNIFICANT CHANGE UP (ref 30–160)
IRON SATN MFR SERPL: 40 % — SIGNIFICANT CHANGE UP (ref 14–50)
LYMPHOCYTES # BLD AUTO: 1.62 K/UL — SIGNIFICANT CHANGE UP (ref 1–3.3)
LYMPHOCYTES # BLD AUTO: 28.1 % — SIGNIFICANT CHANGE UP (ref 13–44)
MCHC RBC-ENTMCNC: 32.4 PG — SIGNIFICANT CHANGE UP (ref 27–34)
MCHC RBC-ENTMCNC: 32.6 GM/DL — SIGNIFICANT CHANGE UP (ref 32–36)
MCV RBC AUTO: 99.5 FL — SIGNIFICANT CHANGE UP (ref 80–100)
MONOCYTES # BLD AUTO: 0.42 K/UL — SIGNIFICANT CHANGE UP (ref 0–0.9)
MONOCYTES NFR BLD AUTO: 7.3 % — SIGNIFICANT CHANGE UP (ref 2–14)
NEUTROPHILS # BLD AUTO: 3.5 K/UL — SIGNIFICANT CHANGE UP (ref 1.8–7.4)
NEUTROPHILS NFR BLD AUTO: 60.8 % — SIGNIFICANT CHANGE UP (ref 43–77)
NRBC # BLD: 0 /100 WBCS — SIGNIFICANT CHANGE UP (ref 0–0)
PLATELET # BLD AUTO: 218 K/UL — SIGNIFICANT CHANGE UP (ref 150–400)
RBC # BLD: 3.7 M/UL — LOW (ref 3.8–5.2)
RBC # FLD: 14.1 % — SIGNIFICANT CHANGE UP (ref 10.3–14.5)
TIBC SERPL-MCNC: 266 UG/DL — SIGNIFICANT CHANGE UP (ref 220–430)
UIBC SERPL-MCNC: 161 UG/DL — SIGNIFICANT CHANGE UP (ref 110–370)
WBC # BLD: 5.76 K/UL — SIGNIFICANT CHANGE UP (ref 3.8–10.5)
WBC # FLD AUTO: 5.76 K/UL — SIGNIFICANT CHANGE UP (ref 3.8–10.5)

## 2021-09-08 ENCOUNTER — APPOINTMENT (OUTPATIENT)
Dept: GASTROENTEROLOGY | Facility: CLINIC | Age: 71
End: 2021-09-08

## 2021-10-04 ENCOUNTER — RX RENEWAL (OUTPATIENT)
Age: 71
End: 2021-10-04

## 2021-10-04 ENCOUNTER — TRANSCRIPTION ENCOUNTER (OUTPATIENT)
Age: 71
End: 2021-10-04

## 2021-10-04 ENCOUNTER — NON-APPOINTMENT (OUTPATIENT)
Age: 71
End: 2021-10-04

## 2021-11-08 ENCOUNTER — NON-APPOINTMENT (OUTPATIENT)
Age: 71
End: 2021-11-08

## 2021-11-08 ENCOUNTER — APPOINTMENT (OUTPATIENT)
Dept: ELECTROPHYSIOLOGY | Facility: CLINIC | Age: 71
End: 2021-11-08

## 2021-11-10 ENCOUNTER — APPOINTMENT (OUTPATIENT)
Dept: HOME HEALTH SERVICES | Facility: HOME HEALTH | Age: 71
End: 2021-11-10
Payer: MEDICARE

## 2021-11-10 VITALS
TEMPERATURE: 97.3 F | SYSTOLIC BLOOD PRESSURE: 140 MMHG | OXYGEN SATURATION: 95 % | DIASTOLIC BLOOD PRESSURE: 82 MMHG | HEART RATE: 80 BPM

## 2021-11-10 PROCEDURE — 90662 IIV NO PRSV INCREASED AG IM: CPT

## 2021-11-10 PROCEDURE — 99349 HOME/RES VST EST MOD MDM 40: CPT | Mod: 25

## 2021-11-10 PROCEDURE — G0008: CPT

## 2021-11-10 NOTE — REVIEW OF SYSTEMS
[Fatigue] : fatigue [Back Pain] : back pain [Itching] : Itching [Unsteady Walking] : ataxia [Negative] : Genitourinary

## 2021-11-11 NOTE — PHYSICAL EXAM
[Normal Sclera/Conjunctiva] : normal sclera/conjunctiva [Normal Outer Ear/Nose] : the ears and nose were normal in appearance [No JVD] : no jugular venous distention [No Respiratory Distress] : no respiratory distress [No Accessory Muscle Use] : no accessory muscle use [Pedal Pulses Present] : the pedal pulses are present [Soft] : abdomen soft [No Hernias] : no hernia was discovered [Patient Refused] : rectal exam was refused by the patient [No CVA Tenderness] : no ~M costovertebral angle tenderness [Kyphosis] :  kyphosis present [No Motor Deficits] : the motor exam was normal [Oriented x3] : oriented to person, place, and time [Normal Affect] : the affect was normal [Breast Exam Declined] : patient declined to have breast exam done [de-identified] : obese  female  in wheelchair   room  cluttered   and filled with smoke  [de-identified] : poor  dentition  [de-identified] : paced rhythm  [de-identified] : obese  [de-identified] : in wheelchair

## 2021-11-11 NOTE — COUNSELING
[] : foot exam [Completed] : Aspirin use discussion completed [Improve exercise tolerance] : improve exercise tolerance [Improve mobility] : improve mobility [Improve weight] : improve weight [Smoker, not interested in quitting] : smoker, not interested in quitting [Full Code] : Code Status: Full Code [No Limitations] : Treatment Guidelines: No limitations [Trial of Intubation] : Intubation: Trial of Intubation [[facility] Date: _____] : [facility] Completion/last verification date: [unfilled]

## 2021-11-11 NOTE — HISTORY OF PRESENT ILLNESS
[Patient] : patient [FreeTextEntry1] : CAD  DM   pacemaker placed  obesity    wheelchair bound  [FreeTextEntry2] : Patient denies fever, cough, trouble breathing, rash, vomiting and diarrhea. Patient has not been in close contact with someone Covid positive.\par  mask ,gloves used during visit  Total  face to  face time  is  30 minutes\par  72 y/o female with history of CAD, DM, pacemaker, wheelchair bound. Patient is being seen today for routine follow-up of chronic conditions.  and flu vaccine  \par  most of the history obtained from  patient \par  has been  stable  since last visit  with  no new major developments  complains, hospitalizations  and no changes with medications  and   unchanged chronic  condition\par  denies any chest  pains  palpitations     shortness  of breath at rest no weight changes   claims she  has at times black stools but feels its  because of  iron pills \par also  has some cough and requests  inhaler  \par  patient continues  to smoke    5 cigarettes a day /or more /\par  Extensive  counseling of  the patient and family about dangers of smoking \par The patient understands the long-term effects and the risks with smoking\par despite that unwilling to quit \par we will continue to encourage smoking cessation  \par  patient  still follows with   specialists  cardiologist  gastroenterologist    dermatologist  but none  recently  but did not  make any effort to get mammogram and see   gynecologist  as  discussed in spring   will involve  s/w to facilitate   care coordination \par \par diet regular  appetite  \par dysphagia none \par Weight claims to lost weight  \par constipation none \par incontinence none \par pressure/bed sores none \par Ambulates in wheelchair  \par falls no  none recently \par Mood good  \par sleep  good \par sensory deficits  none \par  \par Goals of care, and disease trajectory of multiple chronic illnesses discussed at length with patient no changes  \par Review of systems otherwise negative. See also updated history sheet. \par \par \par  \par \par \par

## 2021-11-12 ENCOUNTER — RX RENEWAL (OUTPATIENT)
Age: 71
End: 2021-11-12

## 2021-11-16 ENCOUNTER — NON-APPOINTMENT (OUTPATIENT)
Age: 71
End: 2021-11-16

## 2021-11-22 LAB
25(OH)D3 SERPL-MCNC: 19.6 NG/ML
ALBUMIN SERPL ELPH-MCNC: 4.8 G/DL
ALP BLD-CCNC: 71 U/L
ALT SERPL-CCNC: 6 U/L
ANION GAP SERPL CALC-SCNC: 16 MMOL/L
AST SERPL-CCNC: 10 U/L
BASOPHILS # BLD AUTO: 0.03 K/UL
BASOPHILS NFR BLD AUTO: 0.5 %
BILIRUB SERPL-MCNC: 0.2 MG/DL
BUN SERPL-MCNC: 23 MG/DL
CALCIUM SERPL-MCNC: 9.9 MG/DL
CHLORIDE SERPL-SCNC: 105 MMOL/L
CHOLEST SERPL-MCNC: 168 MG/DL
CO2 SERPL-SCNC: 23 MMOL/L
CREAT SERPL-MCNC: 0.74 MG/DL
EOSINOPHIL # BLD AUTO: 0.19 K/UL
EOSINOPHIL NFR BLD AUTO: 3 %
GLUCOSE SERPL-MCNC: 106 MG/DL
HCT VFR BLD CALC: 37.9 %
HDLC SERPL-MCNC: 93 MG/DL
HGB BLD-MCNC: 12.1 G/DL
IMM GRANULOCYTES NFR BLD AUTO: 0.2 %
IRON SERPL-MCNC: 76 UG/DL
LDLC SERPL CALC-MCNC: 61 MG/DL
LYMPHOCYTES # BLD AUTO: 2.01 K/UL
LYMPHOCYTES NFR BLD AUTO: 32.3 %
MAN DIFF?: NORMAL
MCHC RBC-ENTMCNC: 31.9 GM/DL
MCHC RBC-ENTMCNC: 32.6 PG
MCV RBC AUTO: 102.2 FL
MONOCYTES # BLD AUTO: 0.4 K/UL
MONOCYTES NFR BLD AUTO: 6.4 %
NEUTROPHILS # BLD AUTO: 3.59 K/UL
NEUTROPHILS NFR BLD AUTO: 57.6 %
NONHDLC SERPL-MCNC: 75 MG/DL
PLATELET # BLD AUTO: 270 K/UL
POTASSIUM SERPL-SCNC: 4.6 MMOL/L
PROT SERPL-MCNC: 7.4 G/DL
RBC # BLD: 3.71 M/UL
RBC # FLD: 14.1 %
SODIUM SERPL-SCNC: 143 MMOL/L
TRIGL SERPL-MCNC: 72 MG/DL
TSH SERPL-ACNC: 2.19 UIU/ML
VIT B12 SERPL-MCNC: 425 PG/ML
WBC # FLD AUTO: 6.23 K/UL

## 2021-12-06 ENCOUNTER — OUTPATIENT (OUTPATIENT)
Dept: OUTPATIENT SERVICES | Facility: HOSPITAL | Age: 71
LOS: 1 days | Discharge: HOME | End: 2021-12-06

## 2021-12-06 DIAGNOSIS — Z90.49 ACQUIRED ABSENCE OF OTHER SPECIFIED PARTS OF DIGESTIVE TRACT: Chronic | ICD-10-CM

## 2021-12-06 DIAGNOSIS — D50.9 IRON DEFICIENCY ANEMIA, UNSPECIFIED: ICD-10-CM

## 2021-12-07 ENCOUNTER — APPOINTMENT (OUTPATIENT)
Dept: HEMATOLOGY ONCOLOGY | Facility: CLINIC | Age: 71
End: 2021-12-07

## 2021-12-22 ENCOUNTER — APPOINTMENT (OUTPATIENT)
Dept: ELECTROPHYSIOLOGY | Facility: CLINIC | Age: 71
End: 2021-12-22

## 2021-12-23 ENCOUNTER — APPOINTMENT (OUTPATIENT)
Dept: MAMMOGRAPHY | Facility: CLINIC | Age: 71
End: 2021-12-23
Payer: MEDICARE

## 2021-12-23 ENCOUNTER — OUTPATIENT (OUTPATIENT)
Dept: OUTPATIENT SERVICES | Facility: HOSPITAL | Age: 71
LOS: 1 days | End: 2021-12-23
Payer: MEDICARE

## 2021-12-23 ENCOUNTER — RESULT REVIEW (OUTPATIENT)
Age: 71
End: 2021-12-23

## 2021-12-23 ENCOUNTER — APPOINTMENT (OUTPATIENT)
Dept: ULTRASOUND IMAGING | Facility: CLINIC | Age: 71
End: 2021-12-23
Payer: MEDICARE

## 2021-12-23 DIAGNOSIS — Z90.49 ACQUIRED ABSENCE OF OTHER SPECIFIED PARTS OF DIGESTIVE TRACT: Chronic | ICD-10-CM

## 2021-12-23 DIAGNOSIS — Z00.00 ENCOUNTER FOR GENERAL ADULT MEDICAL EXAMINATION WITHOUT ABNORMAL FINDINGS: ICD-10-CM

## 2021-12-23 PROCEDURE — 77066 DX MAMMO INCL CAD BI: CPT | Mod: 26

## 2021-12-23 PROCEDURE — G0279: CPT | Mod: 26

## 2021-12-23 PROCEDURE — G0279: CPT

## 2021-12-23 PROCEDURE — 77066 DX MAMMO INCL CAD BI: CPT

## 2021-12-27 ENCOUNTER — FORM ENCOUNTER (OUTPATIENT)
Age: 71
End: 2021-12-27

## 2022-01-05 ENCOUNTER — APPOINTMENT (OUTPATIENT)
Dept: HOME HEALTH SERVICES | Facility: HOME HEALTH | Age: 72
End: 2022-01-05

## 2022-01-06 ENCOUNTER — NON-APPOINTMENT (OUTPATIENT)
Age: 72
End: 2022-01-06

## 2022-01-06 ENCOUNTER — APPOINTMENT (OUTPATIENT)
Dept: ELECTROPHYSIOLOGY | Facility: CLINIC | Age: 72
End: 2022-01-06
Payer: MEDICARE

## 2022-01-06 VITALS — HEART RATE: 88 BPM | DIASTOLIC BLOOD PRESSURE: 77 MMHG | SYSTOLIC BLOOD PRESSURE: 141 MMHG

## 2022-01-06 VITALS
OXYGEN SATURATION: 98 % | HEIGHT: 62 IN | HEART RATE: 91 BPM | RESPIRATION RATE: 14 BRPM | BODY MASS INDEX: 30.36 KG/M2 | WEIGHT: 165 LBS

## 2022-01-06 PROCEDURE — 93280 PM DEVICE PROGR EVAL DUAL: CPT

## 2022-01-21 ENCOUNTER — TRANSCRIPTION ENCOUNTER (OUTPATIENT)
Age: 72
End: 2022-01-21

## 2022-02-07 ENCOUNTER — RX RENEWAL (OUTPATIENT)
Age: 72
End: 2022-02-07

## 2022-03-01 ENCOUNTER — NON-APPOINTMENT (OUTPATIENT)
Age: 72
End: 2022-03-01

## 2022-03-01 NOTE — ED STATDOCS - NS ED MD DISPO TELE ADMIT
Trazodone 50 mg at bedtime and may increase to 100 mg if needed to fall asleep    Call with update in 2 weeks  
Chest Pain with ED Heart Score 4 or greater

## 2022-03-03 ENCOUNTER — APPOINTMENT (OUTPATIENT)
Dept: HOME HEALTH SERVICES | Facility: HOME HEALTH | Age: 72
End: 2022-03-03
Payer: MEDICARE

## 2022-03-03 VITALS
OXYGEN SATURATION: 95 % | TEMPERATURE: 97.6 F | SYSTOLIC BLOOD PRESSURE: 150 MMHG | DIASTOLIC BLOOD PRESSURE: 80 MMHG | HEART RATE: 80 BPM

## 2022-03-03 DIAGNOSIS — Z23 ENCOUNTER FOR IMMUNIZATION: ICD-10-CM

## 2022-03-03 PROCEDURE — 90670 PCV13 VACCINE IM: CPT

## 2022-03-03 PROCEDURE — 99349 HOME/RES VST EST MOD MDM 40: CPT | Mod: 25

## 2022-03-03 PROCEDURE — G0009: CPT

## 2022-03-03 NOTE — COUNSELING
[Smoker, not interested in quitting] : smoker, not interested in quitting [] : foot exam [Completed] : Aspirin use discussion completed [Improve exercise tolerance] : improve exercise tolerance [Improve mobility] : improve mobility [Improve weight] : improve weight [Full Code] : Code Status: Full Code [No Limitations] : Treatment Guidelines: No limitations [Trial of Intubation] : Intubation: Trial of Intubation [Last Verification Date: _____] : [facility] Completion/last verification date: [unfilled]

## 2022-03-03 NOTE — HISTORY OF PRESENT ILLNESS
[Patient] : patient [FreeTextEntry1] : CAD  DM   pacemaker placed  obesity    wheelchair bound  [FreeTextEntry2] : Patient denies fever, cough, trouble breathing, rash, vomiting and diarrhea. Patient has not been in close contact with someone Covid positive.\par  mask ,gloves used during visit  Total  face to  face time  is  30 minutes\par  72 y/o female with history of CAD, DM, pacemaker, wheelchair bound. Patient is being seen today for routine follow-up of chronic conditions.  \par  most of the history obtained from  patient \par  has been  stable  since last visit  with  no new major developments  complains, hospitalizations  and no changes with medications  and   unchanged chronic  condition\par  denies any chest  pains  palpitations     shortness  of breath at rest no weight changes   claims she  has at [facility]\par also  has some cough and  sore throat \par  patient continues  to smoke    5 cigarettes a day /or more /\par  Extensive  counseling of  the patient and family about dangers of smoking \par The patient understands the long-term effects and the risks with smoking\par despite that unwilling to quit \par  did not go for   CT  \par we will continue to encourage smoking cessation  \par  patient  still follows with   specialists  cardiologist  gastroenterologist    dermatologist  but none  recently  but did not  make any effort to get mammogram and see   gynecologist  as  discussed in spring   will involve  s/w to facilitate   care coordination \par \par diet regular  appetite  \par dysphagia none \par Weight claims to lost weight  \par constipation none \par incontinence none \par pressure/bed sores none \par Ambulates in wheelchair  \par falls no  none recently \par Mood good  \par sleep  good \par sensory deficits  none \par  \par Goals of care, and disease trajectory of multiple chronic illnesses discussed at length with patient no changes  \par Review of systems otherwise negative. See also updated history sheet. \par \par \par  \par \par \par

## 2022-03-03 NOTE — PHYSICAL EXAM
[Normal Sclera/Conjunctiva] : normal sclera/conjunctiva [Normal Outer Ear/Nose] : the ears and nose were normal in appearance [No JVD] : no jugular venous distention [No Respiratory Distress] : no respiratory distress [No Accessory Muscle Use] : no accessory muscle use [Pedal Pulses Present] : the pedal pulses are present [Breast Exam Declined] : patient declined to have breast exam done [Soft] : abdomen soft [No Hernias] : no hernia was discovered [Patient Refused] : rectal exam was refused by the patient [No CVA Tenderness] : no ~M costovertebral angle tenderness [Kyphosis] :  kyphosis present [No Motor Deficits] : the motor exam was normal [Oriented x3] : oriented to person, place, and time [Normal Affect] : the affect was normal [de-identified] : obese  female  in wheelchair   room  cluttered   and filled with smoke  [de-identified] : poor  dentition  [de-identified] : paced rhythm  [de-identified] : obese  [de-identified] : in wheelchair

## 2022-04-07 ENCOUNTER — TRANSCRIPTION ENCOUNTER (OUTPATIENT)
Age: 72
End: 2022-04-07

## 2022-04-07 ENCOUNTER — APPOINTMENT (OUTPATIENT)
Dept: ELECTROPHYSIOLOGY | Facility: CLINIC | Age: 72
End: 2022-04-07
Payer: MEDICARE

## 2022-04-08 ENCOUNTER — NON-APPOINTMENT (OUTPATIENT)
Age: 72
End: 2022-04-08

## 2022-04-08 ENCOUNTER — APPOINTMENT (OUTPATIENT)
Dept: HOME HEALTH SERVICES | Facility: HOME HEALTH | Age: 72
End: 2022-04-08

## 2022-04-08 PROCEDURE — 93294 REM INTERROG EVL PM/LDLS PM: CPT

## 2022-04-08 PROCEDURE — 93296 REM INTERROG EVL PM/IDS: CPT

## 2022-04-10 VITALS
RESPIRATION RATE: 16 BRPM | HEART RATE: 86 BPM | SYSTOLIC BLOOD PRESSURE: 140 MMHG | DIASTOLIC BLOOD PRESSURE: 80 MMHG | OXYGEN SATURATION: 95 %

## 2022-04-13 ENCOUNTER — NON-APPOINTMENT (OUTPATIENT)
Age: 72
End: 2022-04-13

## 2022-04-13 DIAGNOSIS — R09.82 POSTNASAL DRIP: ICD-10-CM

## 2022-04-15 ENCOUNTER — NON-APPOINTMENT (OUTPATIENT)
Age: 72
End: 2022-04-15

## 2022-05-29 ENCOUNTER — TRANSCRIPTION ENCOUNTER (OUTPATIENT)
Age: 72
End: 2022-05-29

## 2022-05-30 ENCOUNTER — TRANSCRIPTION ENCOUNTER (OUTPATIENT)
Age: 72
End: 2022-05-30

## 2022-06-04 ENCOUNTER — TRANSCRIPTION ENCOUNTER (OUTPATIENT)
Age: 72
End: 2022-06-04

## 2022-06-06 ENCOUNTER — TRANSCRIPTION ENCOUNTER (OUTPATIENT)
Age: 72
End: 2022-06-06

## 2022-06-07 ENCOUNTER — NON-APPOINTMENT (OUTPATIENT)
Age: 72
End: 2022-06-07

## 2022-06-09 ENCOUNTER — APPOINTMENT (OUTPATIENT)
Dept: HOME HEALTH SERVICES | Facility: HOME HEALTH | Age: 72
End: 2022-06-09
Payer: MEDICARE

## 2022-06-09 VITALS
HEART RATE: 84 BPM | SYSTOLIC BLOOD PRESSURE: 140 MMHG | DIASTOLIC BLOOD PRESSURE: 80 MMHG | TEMPERATURE: 97 F | OXYGEN SATURATION: 97 %

## 2022-06-09 DIAGNOSIS — H61.23 IMPACTED CERUMEN, BILATERAL: ICD-10-CM

## 2022-06-09 DIAGNOSIS — Z23 ENCOUNTER FOR IMMUNIZATION: ICD-10-CM

## 2022-06-09 DIAGNOSIS — K08.89 OTHER SPECIFIED DISORDERS OF TEETH AND SUPPORTING STRUCTURES: ICD-10-CM

## 2022-06-09 PROCEDURE — 0004A: CPT

## 2022-06-09 PROCEDURE — 99406 BEHAV CHNG SMOKING 3-10 MIN: CPT

## 2022-06-09 PROCEDURE — 99349 HOME/RES VST EST MOD MDM 40: CPT | Mod: 25

## 2022-06-09 NOTE — COUNSELING
[Smoker, not interested in quitting] : smoker, not interested in quitting [] : foot exam [Completed] : Aspirin use discussion completed [Improve exercise tolerance] : improve exercise tolerance [Improve mobility] : improve mobility [Improve weight] : improve weight [Full Code] : Code Status: Full Code [No Limitations] : Treatment Guidelines: No limitations [Trial of Intubation] : Intubation: Trial of Intubation [Last Ver[facility] Date: _____] : [facility] Completion/last verification date: [unfilled] [42853 - Tobacco Use Cessation Intermediate Greater Than 3 Minutes Up to 10 Minutes] : Tobacco Use Cessation Intermediate Greater Than 3 Minutes Up to 10 Minutes

## 2022-06-12 PROBLEM — H61.23 BILATERAL IMPACTED CERUMEN: Status: ACTIVE | Noted: 2022-06-12

## 2022-06-12 PROBLEM — K08.89 TOOTH PAIN: Status: ACTIVE | Noted: 2022-05-30

## 2022-06-12 NOTE — HISTORY OF PRESENT ILLNESS
[Patient] : patient [FreeTextEntry1] : CAD  DM   pacemaker placed  obesity    wheelchair bound  [FreeTextEntry2] : Patient denies fever, cough, trouble breathing, rash, vomiting and diarrhea. Patient has not been in close contact with someone Covid positive.\par  mask ,gloves used during visit  Total  face to  face time  is  30 minutes\par  72 y/o female with history of CAD, DM, pacemaker, wheelchair bound.\par   patient is  seen today for an acute visit  \par  most of the history obtained from  patient interval events reviewed   and addressed\par  patient finished   clindamycin for  presumed tooth infection  feel better  but still with some  discomfort in jaw  and ear \par also small  ganglion cyst on her wrist  \par  did not use ear drops    no fevers \par  denies any chest  pains  palpitations     shortness  of breath at rest no weight changes  \par  patient continues  to smoke    5 cigarettes a day /or more /\par  Extensive  counseling of  the patient and family about dangers of smoking \par The patient understands the long-term effects and the risks with smoking\par despite that unwilling to quit \par  did not go for   CT  \par we will continue to encourage smoking cessation  \par  patient  still follows with   specialists  cardiologist  gastroenterologist    dermatologist  but none  recently  but did \par \par diet regular  appetite  \par dysphagia none \par Weight claims to lost weight  \par constipation none \par incontinence none \par pressure/bed sores none \par Ambulates in wheelchair  \par falls no  none recently \par Mood good  \par sleep  good \par sensory deficits  none \par Patient denies fever, cough, trouble breathing, rash, vomiting, diarrhea. Patient has not been in close contact with someone who is COVID positive.\par \par N95 mask, gloves, eye wear and gown (if indicated) used during visit: Y. Total face to face time with patient:[ ]\par \par JANIA PEÑA JANIA Sep 14 1950 being screened for COVID-19 vaccine administration visit.\par \par Information received by [patient]/[caregiver]/[proxy]\par \par JANIA PEÑA denies moderate to severe acute illness with or without fever.\par \par JANIA PEÑA denies having a positive test for COVID-19 and denies being told by a doctor that she has COVID-19 in the past 14 days.\par \par JANIA PEÑA denies receiving passive antibody therapy (monoclonal antibodies or convalescent plasma) as a treatment for COVID-19 in the past 90 days (3 months).\par \par JANIA PEÑA denies ever having an immediate allergic reaction of any severity to a prior vaccine or injectable therapy, and denies anaphylaxis from any cause, including polysorbate-80 or polyethylene glycol (Miralax).\par \par JANIA PEÑA's legal representative is: [ ] /self (patient)\par \par .ppb - Pfizer positive screener\par \par JANIA DYKES Sep 14 1950 being screened for COVID-19 vaccine administration visit.\par \par Information received by [patient]/[caregiver]/[proxy].\par \par JANIA PEÑA's legal representative is:[ ] /self (patient\par \par \par ANDREINA KEYS educated JANIA PEÑA or their legal representative about the Pfizer COVID-19 vaccine and provided the information sheet.\par \par ANDREINA KEYS educated JANIA PEÑA or their legal representative that this vaccine booster is recommended to boost the vaccine's effectiveness.\par \par ANDREINA KEYS educated that there will be no cost to JANIA PEÑA for this vaccine and that any monies or benefits for administering the vaccine will be assigned and transferred to the vaccinating provider, including benefits/monies from JANIA PEÑA's health plan, Medicare or other third parties who are financially responsible for their medical care.\par \par JANIA PEÑA or their legal representative consents to the release of all information needed (including but not limited to medical records, copies of claims and itemized bills) to verify payment and as needed for other public health purposes, including reporting to applicable vaccine registries.\par \par ANDREINA KEYS provided JANIA PEÑA or their legal representative a chance to ask questions and reviewed the benefits and risks of the vaccination as described. JANIA PEÑA or their legal representative consents to the administration of the COVID-19 vaccination.\par \par Patient screened as above.\par \par After consent verbally received, the Pfizer-BioNTech COVID19 vaccine vial was gently inverted 10 times, and in an upright position, using aseptic technique, a 0.3 mL dose was drawn up into a 1 mL syringe. The [left]/[right] deltoid was prepped with alcohol swab, and vaccine was administered at a 90 degree angle. A bandage was applied.\par \par Patient was monitored for [15]/[30] minutes afterwards. Complications included: [none]. \par \par \par  \par \par \par

## 2022-06-12 NOTE — PHYSICAL EXAM
[Normal Sclera/Conjunctiva] : normal sclera/conjunctiva [Normal Outer Ear/Nose] : the ears and nose were normal in appearance [No JVD] : no jugular venous distention [No Respiratory Distress] : no respiratory distress [No Accessory Muscle Use] : no accessory muscle use [Pedal Pulses Present] : the pedal pulses are present [Breast Exam Declined] : patient declined to have breast exam done [Soft] : abdomen soft [No Hernias] : no hernia was discovered [Patient Refused] : rectal exam was refused by the patient [No CVA Tenderness] : no ~M costovertebral angle tenderness [Kyphosis] :  kyphosis present [No Motor Deficits] : the motor exam was normal [Oriented x3] : oriented to person, place, and time [Normal Affect] : the affect was normal [de-identified] : obese  female  in wheelchair   room  cluttered     [de-identified] : poor  dentition   tooth decay but no absecess or lesion  noted in mouth  b oth TM occludede with wax  [de-identified] :  minimal  submandibular  tenderness   shotty  LAD  ??  [de-identified] : paced rhythm  [de-identified] : obese  [de-identified] : in wheelchair

## 2022-06-12 NOTE — REASON FOR VISIT
[Pre-Visit Preparation] : pre-visit preparation was done [Intercurrent Specialty/Sub-specialty Visits] : the patient has intercurrent specialty/sub-specialty visits [Acute] : an acute visit [FreeTextEntry2] : chart review  [FreeTextEntry3] : GI, heme/onc

## 2022-06-16 ENCOUNTER — RX RENEWAL (OUTPATIENT)
Age: 72
End: 2022-06-16

## 2022-06-25 NOTE — H&P ADULT - HISTORY OF PRESENT ILLNESS
Pt is a 68 y/o female with a PMHx of type 2 DM, HTN, polio with b/l LE atrophy presents to the ED c/o right sided upper back pain x2weeks. For 2 weeks, pt has had right sided upper back, sub scapular pain. Pt states the pain is worsened and becomes sharp with deep breaths. Pt is not sure how the pain started. Pt tried Bengay, Advil PM without relief. Pt is a 66 y/o/f with extensive pmhx of type II DM, HTN, polio (b/l LE atrophy) admitted with cc of (R) sided back pain around the shoulder blade past 2 weeks. Tried to alleviate her back pain with NSAIDs - without any resolution of pain. Patient states that, her back pain worse with motion / radiating occasionally towards her (R) upper extremity and deep breaths. While in the ED - EKG demonstrated QRS prolongation with LBB block. In addition, tele demonstrating episodes of dropped QRS complexes without prolongation of KS interval. Patient chest pain free. Denies any jaw pain, shoulder pain, diaphoresis or shortness of breath. 2 = A lot of assistance Pt is a 66 y/o/f with extensive pmhx of type II DM, HTN, polio (b/l LE atrophy) admitted with cc of (R) sided back pain around the shoulder blade past 2 weeks. Tried to alleviate her back pain with NSAIDs - without any resolution of pain. Patient states that, her back pain worse with motion / radiating occasionally towards her (R) upper extremity and deep breaths. While in the ED - EKG demonstrated QRS prolongation with LBBB. In addition, tele demonstrating episodes of dropped QRS complexes without prolongation of MT interval. Patient chest pain free. Denies any jaw pain, shoulder pain, diaphoresis or shortness of breath. Pt is a 66 y/o/f with extensive pmhx of type II DM, HTN, polio (b/l LE atrophy) admitted with cc of (R) sided back pain around the shoulder blade past 2 weeks. Tried to alleviate her back pain with NSAIDs - without any resolution of pain. Patient states that, her back pain worse with motion / radiating occasionally towards her (R) upper extremity and deep breaths. While in the ED - EKG demonstrated QRS prolongation with LBBB. In addition, tele demonstrating episodes of dropped QRS complexes without prolongation of CA interval. Patient chest pain free. Denies any jaw pain, shoulder pain, diaphoresis or shortness of breath. Patient's risk factors include - DM /  HTN.     care discussed with patient's cardiologist Dr. Paige. care also discussed with EP service.

## 2022-07-07 ENCOUNTER — APPOINTMENT (OUTPATIENT)
Dept: ELECTROPHYSIOLOGY | Facility: CLINIC | Age: 72
End: 2022-07-07

## 2022-07-08 ENCOUNTER — TRANSCRIPTION ENCOUNTER (OUTPATIENT)
Age: 72
End: 2022-07-08

## 2022-07-08 ENCOUNTER — NON-APPOINTMENT (OUTPATIENT)
Age: 72
End: 2022-07-08

## 2022-07-08 PROCEDURE — 93294 REM INTERROG EVL PM/LDLS PM: CPT

## 2022-07-08 PROCEDURE — 93296 REM INTERROG EVL PM/IDS: CPT

## 2022-07-13 ENCOUNTER — NON-APPOINTMENT (OUTPATIENT)
Age: 72
End: 2022-07-13

## 2022-07-14 ENCOUNTER — LABORATORY RESULT (OUTPATIENT)
Age: 72
End: 2022-07-14

## 2022-07-15 ENCOUNTER — LABORATORY RESULT (OUTPATIENT)
Age: 72
End: 2022-07-15

## 2022-08-15 ENCOUNTER — APPOINTMENT (OUTPATIENT)
Dept: HOME HEALTH SERVICES | Facility: HOME HEALTH | Age: 72
End: 2022-08-15

## 2022-10-05 ENCOUNTER — RX RENEWAL (OUTPATIENT)
Age: 72
End: 2022-10-05

## 2022-10-06 ENCOUNTER — APPOINTMENT (OUTPATIENT)
Dept: ELECTROPHYSIOLOGY | Facility: CLINIC | Age: 72
End: 2022-10-06

## 2022-10-07 ENCOUNTER — TRANSCRIPTION ENCOUNTER (OUTPATIENT)
Age: 72
End: 2022-10-07

## 2022-10-07 ENCOUNTER — NON-APPOINTMENT (OUTPATIENT)
Age: 72
End: 2022-10-07

## 2022-10-07 PROCEDURE — 93294 REM INTERROG EVL PM/LDLS PM: CPT

## 2022-10-07 PROCEDURE — 93296 REM INTERROG EVL PM/IDS: CPT

## 2022-10-12 ENCOUNTER — APPOINTMENT (OUTPATIENT)
Dept: HOME HEALTH SERVICES | Facility: HOME HEALTH | Age: 72
End: 2022-10-12

## 2022-10-12 VITALS
TEMPERATURE: 97 F | HEART RATE: 80 BPM | OXYGEN SATURATION: 96 % | DIASTOLIC BLOOD PRESSURE: 70 MMHG | SYSTOLIC BLOOD PRESSURE: 126 MMHG

## 2022-10-12 PROCEDURE — 99349 HOME/RES VST EST MOD MDM 40: CPT | Mod: 25

## 2022-10-12 PROCEDURE — 90662 IIV NO PRSV INCREASED AG IM: CPT

## 2022-10-12 PROCEDURE — G0008: CPT

## 2022-10-12 RX ORDER — CLINDAMYCIN HYDROCHLORIDE 300 MG/1
300 CAPSULE ORAL
Qty: 28 | Refills: 0 | Status: DISCONTINUED | COMMUNITY
Start: 2022-05-30 | End: 2022-10-12

## 2022-10-12 RX ORDER — CLINDAMYCIN HYDROCHLORIDE 150 MG/1
150 CAPSULE ORAL
Qty: 21 | Refills: 0 | Status: DISCONTINUED | COMMUNITY
Start: 2022-05-30 | End: 2022-10-12

## 2022-10-12 NOTE — PHYSICAL EXAM
[Normal Sclera/Conjunctiva] : normal sclera/conjunctiva [Normal Outer Ear/Nose] : the ears and nose were normal in appearance [No JVD] : no jugular venous distention [No Respiratory Distress] : no respiratory distress [No Accessory Muscle Use] : no accessory muscle use [Pedal Pulses Present] : the pedal pulses are present [Breast Exam Declined] : patient declined to have breast exam done [Soft] : abdomen soft [No Hernias] : no hernia was discovered [Patient Refused] : rectal exam was refused by the patient [No CVA Tenderness] : no ~M costovertebral angle tenderness [Kyphosis] :  kyphosis present [No Motor Deficits] : the motor exam was normal [Oriented x3] : oriented to person, place, and time [Normal Affect] : the affect was normal [de-identified] : obese  female  in wheelchair   room  cluttered   and filled with smoke  [de-identified] : poor  dentition  [de-identified] : paced rhythm  [de-identified] : obese  [de-identified] : in wheelchair

## 2022-10-12 NOTE — HISTORY OF PRESENT ILLNESS
[Patient] : patient [FreeTextEntry1] : CAD  DM   pacemaker placed  obesity    wheelchair bound  [FreeTextEntry2] : Patient denies fever, cough, trouble breathing, rash, vomiting and diarrhea. Patient has not been in close contact with someone Covid positive.\par  mask ,gloves used during visit  Total  face to  face time  is  30 minutes\par  70 y/o female with history of CAD, DM, pacemaker, wheelchair bound. Patient is being seen today for routine follow-up of chronic conditions.  \par  most of the history obtained from  patient \par  interval events reviewed   and addressed \par  has been  stable  since last visit  with  no new major developments  complains, hospitalizations  and no changes with medications  and   unchanged chronic  condition\par  denies any chest  pains  palpitations     shortness  of breath at rest no weight changes   \par \par  patient continues  to smoke    5 cigarettes a day /or more /\par  Extensive  counseling of  the patient and family about dangers of smoking \par The patient understands the long-term effects and the risks with smoking\par despite that unwilling to quit \par  did not go for   CT   will refer again    again stressed   compliance   with medications  and plan of care \par  patient verbalized  understanding  and willingness \par we will continue to encourage smoking cessation  \par  patient  still follows with   specialists  cardiologist  gastroenterologist    dermatologist  but none  recently  but did not  make any effort to get mammogram and see   gynecologist  as  discussed in spring   will involve  s/w to facilitate   care coordination \par \par diet regular  appetite  \par dysphagia none \par Weight claims to lost weight  \par constipation none \par incontinence none \par pressure/bed sores none \par Ambulates in wheelchair  \par falls no  none recently \par Mood good  \par sleep  good \par sensory deficits  none \par  \par Goals of care, and disease trajectory of multiple chronic illnesses discussed at length with patient no changes  \par Review of systems otherwise negative. See also updated history sheet. \par \par \par  \par \par \par

## 2022-10-12 NOTE — COUNSELING
[Smoker, not interested in quitting] : smoker, not interested in quitting [] : eye exam [Completed] : Aspirin use discussion completed [Improve exercise tolerance] : improve exercise tolerance [Improve mobility] : improve mobility [Improve weight] : improve weight [Full Code] : Code Status: Full Code [No Limitations] : Treatment Guidelines: No limitations [Trial of Intubation] : Intubation: Trial of Intubation [Last Ver[facility] Date: _____] : [facility] Completion/last verification date: [unfilled]

## 2022-10-18 ENCOUNTER — NON-APPOINTMENT (OUTPATIENT)
Age: 72
End: 2022-10-18

## 2022-11-01 ENCOUNTER — NON-APPOINTMENT (OUTPATIENT)
Age: 72
End: 2022-11-01

## 2022-11-01 RX ORDER — PSEUDOEPHEDRINE HCL 30 MG/1
30 TABLET, FILM COATED ORAL
Qty: 1 | Refills: 0 | Status: DISCONTINUED | COMMUNITY
Start: 2022-04-13 | End: 2022-11-01

## 2022-11-02 ENCOUNTER — LABORATORY RESULT (OUTPATIENT)
Age: 72
End: 2022-11-02

## 2022-11-04 ENCOUNTER — NON-APPOINTMENT (OUTPATIENT)
Age: 72
End: 2022-11-04

## 2022-11-04 RX ORDER — LEVOFLOXACIN 750 MG/1
750 TABLET, FILM COATED ORAL DAILY
Qty: 5 | Refills: 0 | Status: COMPLETED | COMMUNITY
Start: 2022-11-04 | End: 2022-11-09

## 2022-11-10 ENCOUNTER — RX RENEWAL (OUTPATIENT)
Age: 72
End: 2022-11-10

## 2022-11-10 ENCOUNTER — TRANSCRIPTION ENCOUNTER (OUTPATIENT)
Age: 72
End: 2022-11-10

## 2022-11-10 ENCOUNTER — NON-APPOINTMENT (OUTPATIENT)
Age: 72
End: 2022-11-10

## 2022-11-16 ENCOUNTER — APPOINTMENT (OUTPATIENT)
Dept: OPHTHALMOLOGY | Facility: CLINIC | Age: 72
End: 2022-11-16

## 2022-11-21 ENCOUNTER — NON-APPOINTMENT (OUTPATIENT)
Age: 72
End: 2022-11-21

## 2022-12-08 ENCOUNTER — NON-APPOINTMENT (OUTPATIENT)
Age: 72
End: 2022-12-08

## 2023-01-06 ENCOUNTER — APPOINTMENT (OUTPATIENT)
Dept: ELECTROPHYSIOLOGY | Facility: CLINIC | Age: 73
End: 2023-01-06
Payer: MEDICARE

## 2023-01-06 ENCOUNTER — NON-APPOINTMENT (OUTPATIENT)
Age: 73
End: 2023-01-06

## 2023-01-06 VITALS
DIASTOLIC BLOOD PRESSURE: 72 MMHG | HEIGHT: 62 IN | HEART RATE: 77 BPM | WEIGHT: 165 LBS | SYSTOLIC BLOOD PRESSURE: 133 MMHG | BODY MASS INDEX: 30.36 KG/M2 | OXYGEN SATURATION: 96 % | RESPIRATION RATE: 16 BRPM

## 2023-01-06 DIAGNOSIS — Z95.0 PRESENCE OF CARDIAC PACEMAKER: ICD-10-CM

## 2023-01-06 DIAGNOSIS — Z86.79 PERSONAL HISTORY OF OTHER DISEASES OF THE CIRCULATORY SYSTEM: ICD-10-CM

## 2023-01-06 PROCEDURE — 93280 PM DEVICE PROGR EVAL DUAL: CPT

## 2023-01-23 ENCOUNTER — NON-APPOINTMENT (OUTPATIENT)
Age: 73
End: 2023-01-23

## 2023-01-24 ENCOUNTER — APPOINTMENT (OUTPATIENT)
Dept: HOME HEALTH SERVICES | Facility: HOME HEALTH | Age: 73
End: 2023-01-24
Payer: MEDICARE

## 2023-01-24 VITALS
OXYGEN SATURATION: 95 % | HEART RATE: 76 BPM | DIASTOLIC BLOOD PRESSURE: 80 MMHG | TEMPERATURE: 97 F | SYSTOLIC BLOOD PRESSURE: 158 MMHG

## 2023-01-24 DIAGNOSIS — Z71.89 OTHER SPECIFIED COUNSELING: ICD-10-CM

## 2023-01-24 DIAGNOSIS — J40 BRONCHITIS, NOT SPECIFIED AS ACUTE OR CHRONIC: ICD-10-CM

## 2023-01-24 DIAGNOSIS — Z91.19 PATIENT'S NONCOMPLIANCE WITH OTHER MEDICAL TREATMENT AND REGIMEN: ICD-10-CM

## 2023-01-24 PROCEDURE — 99348 HOME/RES VST EST LOW MDM 30: CPT | Mod: 25

## 2023-01-24 PROCEDURE — 99497 ADVNCD CARE PLAN 30 MIN: CPT

## 2023-01-24 NOTE — COUNSELING
[Smoker, not interested in quitting] : smoker, not interested in quitting [] : foot exam [Completed] : Aspirin use discussion completed [Improve exercise tolerance] : improve exercise tolerance [Improve mobility] : improve mobility [Improve weight] : improve weight [Full Code] : Code Status: Full Code [No Limitations] : Treatment Guidelines: No limitations [Trial of Intubation] : Intubation: Trial of Intubation [[facility] Date: _____] : [facility] Com[facility]/last verification date: [unfilled] [Completed Medical Orders for Life-Sustaining Treatment] : completed medical orders for life-sustaining treatment [_____] : HCP: [unfilled]

## 2023-01-24 NOTE — PHYSICAL EXAM
[Normal Sclera/Conjunctiva] : normal sclera/conjunctiva [Normal Outer Ear/Nose] : the ears and nose were normal in appearance [No JVD] : no jugular venous distention [No Respiratory Distress] : no respiratory distress [No Accessory Muscle Use] : no accessory muscle use [Pedal Pulses Present] : the pedal pulses are present [Breast Exam Declined] : patient declined to have breast exam done [Soft] : abdomen soft [No Hernias] : no hernia was discovered [Patient Refused] : rectal exam was refused by the patient [No CVA Tenderness] : no ~M costovertebral angle tenderness [Kyphosis] :  kyphosis present [No Motor Deficits] : the motor exam was normal [Oriented x3] : oriented to person, place, and time [Normal Affect] : the affect was normal [de-identified] : obese  female  in wheelchair   room  cluttered   and filled with smoke  patient climas she did not take her meds   yet  [de-identified] : poor  dentition  [de-identified] :  diffuse  wheezing   [de-identified] : paced rhythm  [de-identified] : obese  [de-identified] : in wheelchair

## 2023-01-24 NOTE — HISTORY OF PRESENT ILLNESS
[Patient] : patient [FreeTextEntry1] : CAD  DM   pacemaker placed  obesity    wheelchair bound  [FreeTextEntry2] : \par  71 y/o female with history of CAD, DM, pacemaker, wheelchair bound. Patient is being seen today for routine follow-up of chronic conditions.  \par  most of the history obtained from  patient \par  interval events reviewed   and addressed \par  has been  stable  since last visit  with  no new major developments  complains, hospitalizations  and no changes with medications  and   unchanged chronic  condition\par  denies any chest  pains  palpitations     shortness  of breath at rest no weight changes   \par  has  been coughing  for last month   with   treatment  and negative w/u  despite  that patient continues  to smoke    5 cigarettes a day /or more /\par  Extensive  counseling of  the patient and family about dangers of smoking \par The patient understands the long-term effects and the risks with smoking\par despite that unwilling to quit \par  did not go for   CT   will refer again    again stressed   compliance   with medications  and plan of care \par  patient verbalized  understanding  and willingness \par we will continue to encourage smoking cessation  \par  patient  still follows with   specialists  cardiologist  gastroenterologist    dermatologist  but none  recently  but did not  make any effort to get mammogram and see   gynecologist  as  discussed in spring   will involve  s/w to facilitate   care coordination \par \par diet regular  appetite  \par dysphagia none \par Weight claims to lost weight  \par constipation none \par incontinence none \par pressure/bed sores none \par Ambulates in wheelchair  \par falls no  none recently \par Mood good  \par sleep  good \par sensory deficits  none \par  \par Goals of care, and disease trajectory of multiple chronic illnesses discussed at length with patient no changes  \par Review of systems otherwise negative. See also updated history sheet. \par \par \par  \par \par \par

## 2023-02-02 ENCOUNTER — NON-APPOINTMENT (OUTPATIENT)
Age: 73
End: 2023-02-02

## 2023-02-06 ENCOUNTER — NON-APPOINTMENT (OUTPATIENT)
Age: 73
End: 2023-02-06

## 2023-02-17 ENCOUNTER — NON-APPOINTMENT (OUTPATIENT)
Age: 73
End: 2023-02-17

## 2023-02-21 ENCOUNTER — APPOINTMENT (OUTPATIENT)
Dept: CT IMAGING | Facility: CLINIC | Age: 73
End: 2023-02-21
Payer: MEDICARE

## 2023-02-21 ENCOUNTER — RESULT REVIEW (OUTPATIENT)
Age: 73
End: 2023-02-21

## 2023-02-21 ENCOUNTER — OUTPATIENT (OUTPATIENT)
Dept: OUTPATIENT SERVICES | Facility: HOSPITAL | Age: 73
LOS: 1 days | End: 2023-02-21
Payer: MEDICARE

## 2023-02-21 DIAGNOSIS — Z90.49 ACQUIRED ABSENCE OF OTHER SPECIFIED PARTS OF DIGESTIVE TRACT: Chronic | ICD-10-CM

## 2023-02-21 DIAGNOSIS — F17.200 NICOTINE DEPENDENCE, UNSPECIFIED, UNCOMPLICATED: ICD-10-CM

## 2023-02-21 PROCEDURE — 71250 CT THORAX DX C-: CPT | Mod: 26

## 2023-02-21 PROCEDURE — 71250 CT THORAX DX C-: CPT

## 2023-02-24 ENCOUNTER — TRANSCRIPTION ENCOUNTER (OUTPATIENT)
Age: 73
End: 2023-02-24

## 2023-02-24 DIAGNOSIS — R91.1 SOLITARY PULMONARY NODULE: ICD-10-CM

## 2023-02-27 ENCOUNTER — NON-APPOINTMENT (OUTPATIENT)
Age: 73
End: 2023-02-27

## 2023-03-13 ENCOUNTER — RX RENEWAL (OUTPATIENT)
Age: 73
End: 2023-03-13

## 2023-03-24 ENCOUNTER — APPOINTMENT (OUTPATIENT)
Dept: INTERNAL MEDICINE | Facility: CLINIC | Age: 73
End: 2023-03-24
Payer: MEDICARE

## 2023-03-24 VITALS
HEIGHT: 62 IN | BODY MASS INDEX: 30.36 KG/M2 | DIASTOLIC BLOOD PRESSURE: 76 MMHG | SYSTOLIC BLOOD PRESSURE: 144 MMHG | RESPIRATION RATE: 20 BRPM | WEIGHT: 165 LBS | HEART RATE: 85 BPM | TEMPERATURE: 98.5 F | OXYGEN SATURATION: 95 %

## 2023-03-24 DIAGNOSIS — I10 ESSENTIAL (PRIMARY) HYPERTENSION: ICD-10-CM

## 2023-03-24 PROCEDURE — 94729 DIFFUSING CAPACITY: CPT

## 2023-03-24 PROCEDURE — 94060 EVALUATION OF WHEEZING: CPT

## 2023-03-24 PROCEDURE — ZZZZZ: CPT

## 2023-03-24 PROCEDURE — 99406 BEHAV CHNG SMOKING 3-10 MIN: CPT

## 2023-03-24 PROCEDURE — 94727 GAS DIL/WSHOT DETER LNG VOL: CPT

## 2023-03-24 PROCEDURE — 99204 OFFICE O/P NEW MOD 45 MIN: CPT | Mod: 25

## 2023-03-24 NOTE — PROCEDURE
[FreeTextEntry1] : Pulmonary function testing today shows a moderate obstructive ventilatory deficit with an FEV1 of 0.99 or 49% predicted.  This is 1.02 post nebulized bronchodilator.  RV/TLC is normal.  Diffusing capacity is moderately reduced, but mildly reduced when corrected for alveolar volume.  Oxygen saturation is 95% on room air.

## 2023-03-24 NOTE — ASSESSMENT
[FreeTextEntry1] : #1  Moderate COPD with emphysema.  The patient will try generic Advair 250 strength 1 inhalation twice a day regularly.  She will have albuterol inhaler available to use as needed for rescue.  She was counseled extensively on cessation of cigarette smoking today.  See above. \par \par #2  Current cigarette smoker.  See above.\par \par #3  Obesity, HTN, DM type 2.  \par \par Patient will continue to follow with her primary care physician.  For following pulmonary appointment in 1 year with full pulmonary function testing at that time.

## 2023-03-24 NOTE — COUNSELING
[Cessation strategies including cessation program discussed] : Cessation strategies including cessation program discussed [Use of nicotine replacement therapies and other medications discussed] : Use of nicotine replacement therapies and other medications discussed [Encouraged to pick a quit date and identify support needed to quit] : Encouraged to pick a quit date and identify support needed to quit [Yes] : Willing to quit smoking [FreeTextEntry1] : 8 [Potential consequences of obesity discussed] : Potential consequences of obesity discussed [Decrease Portions] : decrease portions [FreeTextEntry2] : healthy foods, ADA, MICAH

## 2023-03-24 NOTE — HISTORY OF PRESENT ILLNESS
[TextBox_4] : This is a first pulmonary appointment for this 72-year-old female with a history of obesity, hypertension, diabetes, and a recent abnormal CT scan of chest.  The latter showed centrilobular emphysema and a very small calcified stable nodule left upper lobe.  Patient has smoked for 60 years she says she only smokes about 1/6 of a pack per day.  She is not history of polio in childhood and femur fracture 1996 and has been wheelchair-bound since 1996.  She is able to stand and do transfers.  She had a recent "cold" however her cough is improved now.  She is not having sputum production or hemoptysis.  She does not notice dyspnea on exertion with her activity.  Occasionally senses a wheeze.  She is not having chest pain or palpitations.  She does have generic Advair 250 strength but is using this only off and on.  She has not been using her rescue albuterol inhaler recently.  Is gone on prednisone approximately 2 times in the past.\par

## 2023-03-29 ENCOUNTER — APPOINTMENT (OUTPATIENT)
Dept: HOME HEALTH SERVICES | Facility: HOME HEALTH | Age: 73
End: 2023-03-29

## 2023-04-06 ENCOUNTER — APPOINTMENT (OUTPATIENT)
Dept: ELECTROPHYSIOLOGY | Facility: CLINIC | Age: 73
End: 2023-04-06
Payer: MEDICARE

## 2023-04-07 ENCOUNTER — NON-APPOINTMENT (OUTPATIENT)
Age: 73
End: 2023-04-07

## 2023-04-07 PROCEDURE — 93294 REM INTERROG EVL PM/LDLS PM: CPT

## 2023-04-07 PROCEDURE — 93296 REM INTERROG EVL PM/IDS: CPT

## 2023-05-30 ENCOUNTER — APPOINTMENT (OUTPATIENT)
Dept: HOME HEALTH SERVICES | Facility: HOME HEALTH | Age: 73
End: 2023-05-30
Payer: MEDICARE

## 2023-05-30 VITALS
DIASTOLIC BLOOD PRESSURE: 80 MMHG | TEMPERATURE: 98.5 F | SYSTOLIC BLOOD PRESSURE: 138 MMHG | OXYGEN SATURATION: 96 % | HEART RATE: 85 BPM

## 2023-05-30 DIAGNOSIS — R05.9 COUGH, UNSPECIFIED: ICD-10-CM

## 2023-05-30 DIAGNOSIS — I70.209 TYPE 2 DIABETES MELLITUS WITH DIABETIC PERIPHERAL ANGIOPATHY W/OUT GANGRENE: ICD-10-CM

## 2023-05-30 DIAGNOSIS — E11.51 TYPE 2 DIABETES MELLITUS WITH DIABETIC PERIPHERAL ANGIOPATHY W/OUT GANGRENE: ICD-10-CM

## 2023-05-30 PROCEDURE — 99349 HOME/RES VST EST MOD MDM 40: CPT

## 2023-05-30 RX ORDER — METHYLPREDNISOLONE 4 MG/1
4 TABLET ORAL
Qty: 1 | Refills: 0 | Status: DISCONTINUED | COMMUNITY
Start: 2023-01-24 | End: 2023-05-30

## 2023-05-30 RX ORDER — BENZONATATE 100 MG/1
100 CAPSULE ORAL
Qty: 21 | Refills: 0 | Status: DISCONTINUED | COMMUNITY
Start: 2022-11-10 | End: 2023-05-30

## 2023-05-30 NOTE — PHYSICAL EXAM
[Normal Sclera/Conjunctiva] : normal sclera/conjunctiva [Normal Outer Ear/Nose] : the ears and nose were normal in appearance [No JVD] : no jugular venous distention [No Respiratory Distress] : no respiratory distress [No Accessory Muscle Use] : no accessory muscle use [Pedal Pulses Present] : the pedal pulses are present [Breast Exam Declined] : patient declined to have breast exam done [Soft] : abdomen soft [No Hernias] : no hernia was discovered [Patient Refused] : rectal exam was refused by the patient [No CVA Tenderness] : no ~M costovertebral angle tenderness [Kyphosis] :  kyphosis present [No Motor Deficits] : the motor exam was normal [Oriented x3] : oriented to person, place, and time [Normal Affect] : the affect was normal [de-identified] : obese  female  in wheelchair   room  cluttered   [de-identified] : poor  dentition  [de-identified] : clear  today  [de-identified] : paced rhythm  [de-identified] : obese  [de-identified] : in wheelchair   left shoulder  ROM limited to 90 degrees no swelling or deformity

## 2023-05-30 NOTE — COUNSELING
[Smoker, not interested in quitting] : smoker, not interested in quitting [] : foot exam [Completed] : Aspirin use discussion completed [Improve exercise tolerance] : improve exercise tolerance [Improve mobility] : improve mobility [Improve weight] : improve weight [Completed Medical Orders for Life-Sustaining Treatment] : completed medical orders for life-sustaining treatment [Full Code] : Code Status: Full Code [No Limitations] : Treatment Guidelines: No limitations [Trial of Intubation] : Intubation: Trial of Intubation [[facility] Date: _____] [facility]last verification date: [unfilled] [_____] : HCP: [unfilled]

## 2023-05-30 NOTE — HISTORY OF PRESENT ILLNESS
[Patient] : patient [FreeTextEntry1] : CAD  DM   pacemaker placed  obesity    wheelchair bound  [FreeTextEntry2] : \par  73 y/o female with history of CAD, DM, pacemaker, wheelchair bound. Patient is being seen today for routine follow-up of chronic conditions.  \par  most of the history obtained from  patient \par  interval events reviewed   and addressed  had seen pulmonary   did not refill advair  \par  has been  stable  since last visit  with  no new major developments  complains, hospitalizations  and no changes with medications  and   unchanged chronic  condition\par  denies any chest  pains  palpitations     shortness  of breath at rest no weight changes   \par patient continues  to smoke    5 cigarettes a day /or more / claims she  had reaction to  nicotine replacement  therapy \par  Extensive  counseling of  the patient and family about dangers of smoking \par The patient understands the long-term effects and the risks with smoking\par despite that unwilling to quit \par we will continue to encourage smoking cessation  \par c/o left shoulder pain and  limits of range of motion \par  concerned about  pacemaker issue  advised to see   cardio  but bartolome get Xray and  pain management \par diet regular  appetite  \par dysphagia none \par Weight claims to lost weight  \par constipation none \par incontinence none \par pressure/bed sores none \par Ambulates in wheelchair  \par falls no  none recently \par Mood good  \par sleep  good \par \par Review of systems otherwise negative. See also updated history sheet. \par \par \par  \par \par \par

## 2023-05-31 ENCOUNTER — TRANSCRIPTION ENCOUNTER (OUTPATIENT)
Age: 73
End: 2023-05-31

## 2023-06-05 ENCOUNTER — LABORATORY RESULT (OUTPATIENT)
Age: 73
End: 2023-06-05

## 2023-06-05 LAB
25(OH)D3 SERPL-MCNC: 17.5 NG/ML
ALBUMIN SERPL ELPH-MCNC: 4.8 G/DL
ALP BLD-CCNC: 67 U/L
ALT SERPL-CCNC: 8 U/L
ANION GAP SERPL CALC-SCNC: 16 MMOL/L
AST SERPL-CCNC: 12 U/L
BILIRUB SERPL-MCNC: 0.2 MG/DL
BUN SERPL-MCNC: 25 MG/DL
CALCIUM SERPL-MCNC: 10.1 MG/DL
CHLORIDE SERPL-SCNC: 103 MMOL/L
CHOLEST SERPL-MCNC: 175 MG/DL
CO2 SERPL-SCNC: 26 MMOL/L
CREAT SERPL-MCNC: 1.08 MG/DL
EGFR: 55 ML/MIN/1.73M2
ESTIMATED AVERAGE GLUCOSE: 108 MG/DL
GLUCOSE SERPL-MCNC: 97 MG/DL
HBA1C MFR BLD HPLC: 5.4 %
HCT VFR BLD CALC: 35.6 %
HDLC SERPL-MCNC: 91 MG/DL
HGB BLD-MCNC: 11.3 G/DL
LDLC SERPL CALC-MCNC: 75 MG/DL
MCHC RBC-ENTMCNC: 31 PG
MCHC RBC-ENTMCNC: 31.7 GM/DL
MCV RBC AUTO: 97.8 FL
NONHDLC SERPL-MCNC: 84 MG/DL
PLATELET # BLD AUTO: 288 K/UL
POTASSIUM SERPL-SCNC: 5 MMOL/L
PROT SERPL-MCNC: 7.3 G/DL
RBC # BLD: 3.64 M/UL
RBC # FLD: 16.3 %
SODIUM SERPL-SCNC: 144 MMOL/L
TRIGL SERPL-MCNC: 47 MG/DL
TSH SERPL-ACNC: 2.15 UIU/ML
VIT B12 SERPL-MCNC: 391 PG/ML
WBC # FLD AUTO: 5.54 K/UL

## 2023-06-06 ENCOUNTER — LABORATORY RESULT (OUTPATIENT)
Age: 73
End: 2023-06-06

## 2023-06-07 ENCOUNTER — RX RENEWAL (OUTPATIENT)
Age: 73
End: 2023-06-07

## 2023-06-26 NOTE — ED ADULT TRIAGE NOTE - ESI TRIAGE ACUITY LEVEL, MLM
----- Message from Ibis Lyon sent at 6/26/2023  1:00 PM CDT -----  Contact:  Seth   Patient is returning a phone call.  Who left a message for the patient: Dr King's office  Does patient know what this is regarding:  yes  Would you like a call back, or a response through your [facility] portal?:   phone  Comments:       
Appointment rescheduled to an earlier date/time as requested.  
3

## 2023-07-03 NOTE — ED ADULT TRIAGE NOTE - MEANS OF ARRIVAL
Patient states that she has been struggling with pain, nausea and fatigue since [facility] on 5/24/23 but since last Wednesday her pain and nausea has increased every time she eat's. Patient scheduled 7/11/23. Patient verbalized if pain and nausea gets worse to go to the nearest ER to be evaluated. ambulatory

## 2023-07-06 ENCOUNTER — APPOINTMENT (OUTPATIENT)
Dept: ELECTROPHYSIOLOGY | Facility: CLINIC | Age: 73
End: 2023-07-06
Payer: MEDICARE

## 2023-07-07 ENCOUNTER — NON-APPOINTMENT (OUTPATIENT)
Age: 73
End: 2023-07-07

## 2023-07-07 PROCEDURE — 93296 REM INTERROG EVL PM/IDS: CPT

## 2023-07-07 PROCEDURE — 93294 REM INTERROG EVL PM/LDLS PM: CPT

## 2023-08-04 ENCOUNTER — TRANSCRIPTION ENCOUNTER (OUTPATIENT)
Age: 73
End: 2023-08-04

## 2023-08-04 ENCOUNTER — NON-APPOINTMENT (OUTPATIENT)
Age: 73
End: 2023-08-04

## 2023-08-04 ENCOUNTER — EMERGENCY (EMERGENCY)
Facility: HOSPITAL | Age: 73
LOS: 0 days | Discharge: HOME | End: 2023-08-05
Attending: EMERGENCY MEDICINE
Payer: MEDICARE

## 2023-08-04 VITALS
HEART RATE: 83 BPM | TEMPERATURE: 98 F | OXYGEN SATURATION: 98 % | SYSTOLIC BLOOD PRESSURE: 143 MMHG | RESPIRATION RATE: 19 BRPM | DIASTOLIC BLOOD PRESSURE: 83 MMHG

## 2023-08-04 DIAGNOSIS — Z90.49 ACQUIRED ABSENCE OF OTHER SPECIFIED PARTS OF DIGESTIVE TRACT: Chronic | ICD-10-CM

## 2023-08-04 DIAGNOSIS — J45.909 UNSPECIFIED ASTHMA, UNCOMPLICATED: ICD-10-CM

## 2023-08-04 DIAGNOSIS — Z90.49 ACQUIRED ABSENCE OF OTHER SPECIFIED PARTS OF DIGESTIVE TRACT: ICD-10-CM

## 2023-08-04 DIAGNOSIS — Z79.84 LONG TERM (CURRENT) USE OF ORAL HYPOGLYCEMIC DRUGS: ICD-10-CM

## 2023-08-04 DIAGNOSIS — I10 ESSENTIAL (PRIMARY) HYPERTENSION: ICD-10-CM

## 2023-08-04 DIAGNOSIS — Z88.0 ALLERGY STATUS TO PENICILLIN: ICD-10-CM

## 2023-08-04 DIAGNOSIS — K76.89 OTHER SPECIFIED DISEASES OF LIVER: ICD-10-CM

## 2023-08-04 DIAGNOSIS — Z88.5 ALLERGY STATUS TO NARCOTIC AGENT: ICD-10-CM

## 2023-08-04 DIAGNOSIS — Z88.2 ALLERGY STATUS TO SULFONAMIDES: ICD-10-CM

## 2023-08-04 DIAGNOSIS — R10.30 LOWER ABDOMINAL PAIN, UNSPECIFIED: ICD-10-CM

## 2023-08-04 DIAGNOSIS — R19.5 OTHER FECAL ABNORMALITIES: ICD-10-CM

## 2023-08-04 DIAGNOSIS — Z95.0 PRESENCE OF CARDIAC PACEMAKER: ICD-10-CM

## 2023-08-04 DIAGNOSIS — E11.9 TYPE 2 DIABETES MELLITUS WITHOUT COMPLICATIONS: ICD-10-CM

## 2023-08-04 DIAGNOSIS — N39.0 URINARY TRACT INFECTION, SITE NOT SPECIFIED: ICD-10-CM

## 2023-08-04 LAB
ALBUMIN SERPL ELPH-MCNC: 3.8 G/DL — SIGNIFICANT CHANGE UP (ref 3.3–5)
ALLERGY+IMMUNOLOGY DIAG STUDY NOTE: SIGNIFICANT CHANGE UP
ALP SERPL-CCNC: 85 U/L — SIGNIFICANT CHANGE UP (ref 40–120)
ALT FLD-CCNC: 12 U/L — SIGNIFICANT CHANGE UP (ref 12–78)
ANION GAP SERPL CALC-SCNC: 5 MMOL/L — SIGNIFICANT CHANGE UP (ref 5–17)
ANTIBODY INTERPRETATION 2: SIGNIFICANT CHANGE UP
AST SERPL-CCNC: 15 U/L — SIGNIFICANT CHANGE UP (ref 15–37)
BASOPHILS # BLD AUTO: 0.03 K/UL — SIGNIFICANT CHANGE UP (ref 0–0.2)
BASOPHILS NFR BLD AUTO: 0.4 % — SIGNIFICANT CHANGE UP (ref 0–2)
BILIRUB SERPL-MCNC: 0.2 MG/DL — SIGNIFICANT CHANGE UP (ref 0.2–1.2)
BLD GP AB SCN SERPL QL: SIGNIFICANT CHANGE UP
BUN SERPL-MCNC: 30 MG/DL — HIGH (ref 7–23)
CALCIUM SERPL-MCNC: 9.3 MG/DL — SIGNIFICANT CHANGE UP (ref 8.5–10.1)
CHLORIDE SERPL-SCNC: 114 MMOL/L — HIGH (ref 96–108)
CO2 SERPL-SCNC: 23 MMOL/L — SIGNIFICANT CHANGE UP (ref 22–31)
CREAT SERPL-MCNC: 0.86 MG/DL — SIGNIFICANT CHANGE UP (ref 0.5–1.3)
DIR ANTIGLOB POLYSPECIFIC INTERPRETATION: SIGNIFICANT CHANGE UP
EGFR: 72 ML/MIN/1.73M2 — SIGNIFICANT CHANGE UP
EOSINOPHIL # BLD AUTO: 0.27 K/UL — SIGNIFICANT CHANGE UP (ref 0–0.5)
EOSINOPHIL NFR BLD AUTO: 4 % — SIGNIFICANT CHANGE UP (ref 0–6)
GLUCOSE SERPL-MCNC: 79 MG/DL — SIGNIFICANT CHANGE UP (ref 70–99)
HCT VFR BLD CALC: 36.5 % — SIGNIFICANT CHANGE UP (ref 34.5–45)
HGB BLD-MCNC: 12.2 G/DL — SIGNIFICANT CHANGE UP (ref 11.5–15.5)
IMM GRANULOCYTES NFR BLD AUTO: 0.1 % — SIGNIFICANT CHANGE UP (ref 0–0.9)
LACTATE SERPL-SCNC: 1.3 MMOL/L — SIGNIFICANT CHANGE UP (ref 0.7–2)
LIDOCAIN IGE QN: 98 U/L — SIGNIFICANT CHANGE UP (ref 73–393)
LYMPHOCYTES # BLD AUTO: 2.15 K/UL — SIGNIFICANT CHANGE UP (ref 1–3.3)
LYMPHOCYTES # BLD AUTO: 32.1 % — SIGNIFICANT CHANGE UP (ref 13–44)
MCHC RBC-ENTMCNC: 32.3 PG — SIGNIFICANT CHANGE UP (ref 27–34)
MCHC RBC-ENTMCNC: 33.4 GM/DL — SIGNIFICANT CHANGE UP (ref 32–36)
MCV RBC AUTO: 96.6 FL — SIGNIFICANT CHANGE UP (ref 80–100)
MONOCYTES # BLD AUTO: 0.44 K/UL — SIGNIFICANT CHANGE UP (ref 0–0.9)
MONOCYTES NFR BLD AUTO: 6.6 % — SIGNIFICANT CHANGE UP (ref 2–14)
NEUTROPHILS # BLD AUTO: 3.8 K/UL — SIGNIFICANT CHANGE UP (ref 1.8–7.4)
NEUTROPHILS NFR BLD AUTO: 56.8 % — SIGNIFICANT CHANGE UP (ref 43–77)
PLATELET # BLD AUTO: 266 K/UL — SIGNIFICANT CHANGE UP (ref 150–400)
POTASSIUM SERPL-MCNC: 5 MMOL/L — SIGNIFICANT CHANGE UP (ref 3.5–5.3)
POTASSIUM SERPL-SCNC: 5 MMOL/L — SIGNIFICANT CHANGE UP (ref 3.5–5.3)
PROT SERPL-MCNC: 7.9 GM/DL — SIGNIFICANT CHANGE UP (ref 6–8.3)
RBC # BLD: 3.78 M/UL — LOW (ref 3.8–5.2)
RBC # FLD: 15.8 % — HIGH (ref 10.3–14.5)
SODIUM SERPL-SCNC: 142 MMOL/L — SIGNIFICANT CHANGE UP (ref 135–145)
TROPONIN I, HIGH SENSITIVITY RESULT: 7.73 NG/L — SIGNIFICANT CHANGE UP
WBC # BLD: 6.7 K/UL — SIGNIFICANT CHANGE UP (ref 3.8–10.5)
WBC # FLD AUTO: 6.7 K/UL — SIGNIFICANT CHANGE UP (ref 3.8–10.5)

## 2023-08-04 PROCEDURE — 86880 COOMBS TEST DIRECT: CPT

## 2023-08-04 PROCEDURE — 74177 CT ABD & PELVIS W/CONTRAST: CPT | Mod: MA

## 2023-08-04 PROCEDURE — 74177 CT ABD & PELVIS W/CONTRAST: CPT | Mod: 26,MA

## 2023-08-04 PROCEDURE — 86850 RBC ANTIBODY SCREEN: CPT

## 2023-08-04 PROCEDURE — 80053 COMPREHEN METABOLIC PANEL: CPT

## 2023-08-04 PROCEDURE — 93005 ELECTROCARDIOGRAM TRACING: CPT

## 2023-08-04 PROCEDURE — 99285 EMERGENCY DEPT VISIT HI MDM: CPT | Mod: 25

## 2023-08-04 PROCEDURE — 36415 COLL VENOUS BLD VENIPUNCTURE: CPT

## 2023-08-04 PROCEDURE — 86901 BLOOD TYPING SEROLOGIC RH(D): CPT

## 2023-08-04 PROCEDURE — 81001 URINALYSIS AUTO W/SCOPE: CPT

## 2023-08-04 PROCEDURE — 85025 COMPLETE CBC W/AUTO DIFF WBC: CPT

## 2023-08-04 PROCEDURE — 96374 THER/PROPH/DIAG INJ IV PUSH: CPT | Mod: XU

## 2023-08-04 PROCEDURE — 83690 ASSAY OF LIPASE: CPT

## 2023-08-04 PROCEDURE — 84484 ASSAY OF TROPONIN QUANT: CPT

## 2023-08-04 PROCEDURE — 99285 EMERGENCY DEPT VISIT HI MDM: CPT

## 2023-08-04 PROCEDURE — 83605 ASSAY OF LACTIC ACID: CPT

## 2023-08-04 PROCEDURE — 86077 PHYS BLOOD BANK SERV XMATCH: CPT

## 2023-08-04 PROCEDURE — 93010 ELECTROCARDIOGRAM REPORT: CPT

## 2023-08-04 PROCEDURE — 86900 BLOOD TYPING SEROLOGIC ABO: CPT

## 2023-08-04 PROCEDURE — 86870 RBC ANTIBODY IDENTIFICATION: CPT

## 2023-08-04 RX ORDER — SODIUM CHLORIDE 9 MG/ML
1000 INJECTION INTRAMUSCULAR; INTRAVENOUS; SUBCUTANEOUS ONCE
Refills: 0 | Status: COMPLETED | OUTPATIENT
Start: 2023-08-04 | End: 2023-08-04

## 2023-08-04 RX ORDER — ACETAMINOPHEN 500 MG
1000 TABLET ORAL ONCE
Refills: 0 | Status: COMPLETED | OUTPATIENT
Start: 2023-08-04 | End: 2023-08-04

## 2023-08-04 RX ORDER — ACETAMINOPHEN 500 MG
100 TABLET ORAL ONCE
Refills: 0 | Status: DISCONTINUED | OUTPATIENT
Start: 2023-08-04 | End: 2023-08-04

## 2023-08-04 RX ADMIN — Medication 400 MILLIGRAM(S): at 22:19

## 2023-08-04 RX ADMIN — SODIUM CHLORIDE 1000 MILLILITER(S): 9 INJECTION INTRAMUSCULAR; INTRAVENOUS; SUBCUTANEOUS at 22:19

## 2023-08-04 NOTE — ED STATDOCS - NSICDXFAMILYHX_GEN_ALL_CORE_FT
FAMILY HISTORY:  Child  Still living? No  Family history of early CAD, Age at diagnosis: Age Unknown

## 2023-08-04 NOTE — ED STATDOCS - OBJECTIVE STATEMENT
Patient is a 72 year old female with a PMHx of HTN, DM, asthma, pacemaker, polio; presenting to the ED c/o lower abdominal pain and black stools x1 day. Endorses pain after going to the bathroom in which she noticed the black stools. Allergic to Morphine.

## 2023-08-04 NOTE — ED CLERICAL - NS ED CLERK NOTE PRE-ARRIVAL INFORMATION; ADDITIONAL PRE-ARRIVAL INFORMATION

## 2023-08-04 NOTE — ED STATDOCS - NSICDXPASTMEDICALHX_GEN_ALL_CORE_FT
PAST MEDICAL HISTORY:  Asthma     DM (diabetes mellitus)     HTN (hypertension)     Pacemaker     Polio

## 2023-08-04 NOTE — ED ADULT NURSE NOTE - HISTORY OF COVID-19 VACCINATION
"Date of Visit: 2023     Chief Complaint:   Chief Complaint   Patient presents with    New Patient     Short stature       Primary Care Physician: NENA Red.     Referring provider: NENA Red.     Patient Identification: Anurag Martin is a 9 y.o. 8 m.o.  male here for follow up of short stature.  Anurag Martin  is accompanied to clinic today by his mother, Lucero.  History is provided by mom, Lucero and prior endocrine records and PCP's records.     HPI:   Anurag Martin  is a 9 y.o. 8 m.o. male who was born at term, AGA without any  issues and has history of developmental delay, recent behavioral concerns, s/p left-sided strabismus surgery with amblyopia of the left eye who is here for follow up of his short stature.    He was previously seen in the pediatric endocrine clinic by Dr. Brenda Jesus in 2020 for short stature.  At that time his IGF-I and IGF-BP3 level was low, thyroid function test and celiac panel were normal.  It was noted that his Fragile X and MicroArray testing came back negative.  He had poor weight gain at the time was recommended follow-up with RD and endocrine.  His height at the endocrine visit in 2020 was -2.6 SDS.  Today his height is -2.4 SDS.    Mom says \"all her kids are small\" and both parents seemed to be late moises.  Mom feels nothing bothers him.     Have switched PCPs as patient has requested a male PCP.     He always seemed constipated, and seems to be holding it in. Have to enemas here intermittently.   Mom thinks he has ADHD and is interested in medication.    Denies any temperature intolerance.  No concerns with sleep.  Mother reports that he was previously a picky eater and this has improved.  He continues to be lactose intolerant.    He has a h/o severe constipation, lactose intolerance and was previously evaluated by Dr Pizano.    Birth History:   born at 39 6/7 weeks in [place], via spontaneous vaginal delivery.  His " birth weight was 3.77 kg, birth length was 48.3 cm.  Unremarkable  course.   Developmental delay was noticed as he grew older.  He has a h/o developmental delay: speech delay, fine and gross motor delay.  In the first 3 years of life he was followed by [facility].  Family moved from [place] to [place] when the child was 3 years old.  At that time he was not talking at all.  Autism was suspected in 2018. Fragile X and microarray testing were reported as normal.     Developmental history: Has had dev. Delay, has an IEP at school.    Past medical/surgical history:   Past Medical History:   Diagnosis Date    Short stature 2020    Development disorder, mixed 2018    Speech or language delay 2018    Suspected autism disorder 2018    Lactose intolerance, unspecified 2018    Otitis media       Past Surgical History:   Procedure Laterality Date    STRABISMUS REPAIR Left 2022    Procedure: STRABISMUS SURGERY - EXTROPIA, ONE HORIZONTAL MUSCLE;  Surgeon: Mitchell Fontanez M.D.;  Location: SURGERY SAME DAY [facility];  Service: Ophthalmology        Family history:  Mother's height:  66 in   , attained menarche at  9 yrs of age.   Father's height:   72 in   , attained final height at not known.  Mom has full custody.   Paternal side- some diabetes  Maternal GM- type 2 diabetes.   Family History   Problem Relation Age of Onset    Other Father         Delayed speech until over 4 years of age ; drugs    Psychiatric Illness Father     No Known Problems Sister     No Known Problems Brother     Cancer Maternal Grandmother     Diabetes Maternal Grandmother     Colon Cancer Paternal great-grandparent     Breast Cancer Maternal great-grandparent     Other Other         Multiple family members with lactose intolerance     Family Status   Relation Name Status    Mo  Alive        DM Type 2    Fa  Alive        DM TYPE 2    Sis  Alive    Bro  Alive    MGMo  Other        Cervical Cancer    pggp Great  "Grandfather         Colon Cancer    mggp Mother's great grandmoth Alive    OTHER  (Not Specified)       Social History:  Lives with mom, mom's partner, and 3 siblings.     Allergies:   Allergies   Allergen Reactions    Food      Lactose intolerance, familial       Current medications:   Current Outpatient Medications   Medication Sig Dispense Refill    atropine 1 % Solution Administer 1 Drop into the right eye 1 time a day as needed on Friday and Saturday night only. (Patient not taking: Reported on 2023) 15 mL 1     No current facility-administered medications for this visit.       Patient Active Problem List    Diagnosis Date Noted    Anger 11/10/2022    Hyperopia of both eyes 2022    Amblyopia of left eye 2022    Aggression 2022    Head injury, closed 2022    Strabismus 2021    Attention deficit hyperactivity disorder (ADHD), combined type 2021    Intellectual disability 05/10/2021    Short stature 2020    Development disorder, mixed 2018    Speech or language delay 2018    Suspected autism disorder 2018    Lactose intolerance, unspecified 2018       Review of Systems:  A full system review is negative unless otherwise mentioned in HPI.    Physical Exam: Parent chaperoned.  /58 (BP Location: Left arm, Patient Position: Sitting, BP Cuff Size: Child)   Pulse 95   Temp 37.2 °C (98.9 °F) (Temporal)   Ht 1.177 m (3' 10.35\")   Wt 20.8 kg (45 lb 15.5 oz)   SpO2 99%   BMI 15.04 kg/m²     Height: <1 %ile (Z= -2.45) based on CDC (Boys, 2-20 Years) Stature-for-age data based on Stature recorded on 2023.  Weight: 2 %ile (Z= -2.13) based on CDC (Boys, 2-20 Years) weight-for-age data using vitals from 2023.  BMI: 26 %ile (Z= -0.65) based on CDC (Boys, 2-20 Years) BMI-for-age based on BMI available as of 2023.    Mid-parental Height: 1.818 m (5' 11.58\") close to 75th percentile.    Constitutional: Well-developed and " well-nourished. No distress.  Eyes: Pupils are equal, round, and reactive to light. Mild redness of right eye.  HENT: Normocephalic, atraumatic, moist mucous membranes, oropharynx appears normal. No midline defects.  Neck: Supple. No thyromegaly present. No cervical lymphadenopathy.  Lungs: Clear to auscultation throughout. No adventitious sounds.   Heart: Regular rate and rhythm. No murmurs, cap refill <3sec  Abd: Soft, non tender and without distention. No palpable masses or organomegaly  Skin: No rash, no cafe au lait spots. No lipodystrophy  Neuro: Alert, interacting appropriately; no gross focal deficits  Skeletal: No madelung deformity. No short 3rd or 4th metacarpals.  : Normal male genitalia. Pubic Hair Nikko I. Testicular volume prepubertal,Nikko I     Laboratory studies:     Latest Reference Range & Units Most Recent   WBC 5.3 - 11.5 K/uL 5.8  3/26/20 11:47   RBC 4.00 - 4.90 M/uL 4.36  3/26/20 11:47   Hemoglobin 10.5 - 12.7 g/dL 13.0 (H)  3/26/20 11:47   Hematocrit 31.7 - 37.7 % 40.1 (H)  3/26/20 11:47   MCV 76.8 - 83.3 fL 92.0 (H)  3/26/20 11:47   MCH 24.1 - 28.4 pg 29.8 (H)  3/26/20 11:47   MCHC 34.2 - 35.7 g/dL 32.4 (L)  3/26/20 11:47   RDW 34.9 - 42.0 fL 46.8 (H)  3/26/20 11:47   Platelet Count 204 - 405 K/uL 318  3/26/20 11:47   MPV 7.2 - 7.9 fL 10.1 (H)  3/26/20 11:47   Neutrophils-Polys 30.30 - 74.30 % 66.90  9/7/18 00:11   Neutrophils (Absolute) 1.54 - 7.92 K/uL 6.88  9/7/18 00:11   Lymphocytes 14.10 - 55.00 % 22.50  9/7/18 00:11   Lymphs (Absolute) 1.50 - 7.00 K/uL 2.31  9/7/18 00:11   Monocytes 4.00 - 9.00 % 8.30  9/7/18 00:11   Monos (Absolute) 0.19 - 0.94 K/uL 0.85  9/7/18 00:11   Eosinophils 0.00 - 4.00 % 1.80  9/7/18 00:11   Eos (Absolute) 0.00 - 0.53 K/uL 0.18  9/7/18 00:11   Basophils 0.00 - 1.00 % 0.20  9/7/18 00:11   Baso (Absolute) 0.00 - 0.06 K/uL 0.02  9/7/18 00:11   Immature Granulocytes 0.00 - 0.90 % 0.30  9/7/18 00:11   Immature Granulocytes (abs) 0.00 - 0.06 K/uL 0.03  9/7/18  "00:11   Nucleated RBC /100 WBC 0.00  9/7/18 00:11   NRBC (Absolute) K/uL 0.00  9/7/18 00:11   Sodium 135 - 145 mmol/L 141  3/26/20 11:47   Potassium 3.6 - 5.5 mmol/L 4.1  3/26/20 11:47   Chloride 96 - 112 mmol/L 108  3/26/20 11:47   Co2 20 - 33 mmol/L 20  3/26/20 11:47   Anion Gap 7.0 - 16.0  13.0  3/26/20 11:47   Glucose 40 - 99 mg/dL 90  3/26/20 11:47   Bun 8 - 22 mg/dL 16  3/26/20 11:47   Creatinine 0.20 - 1.00 mg/dL 0.25  3/26/20 11:47   Calcium 8.5 - 10.5 mg/dL 9.2  3/26/20 11:47   AST(SGOT) 12 - 45 U/L 39  3/26/20 11:47   ALT(SGPT) 2 - 50 U/L 17  3/26/20 11:47   Alkaline Phosphatase 170 - 390 U/L 216  3/26/20 11:47   Total Bilirubin 0.1 - 0.8 mg/dL 0.2  3/26/20 11:47   Albumin 3.2 - 4.9 g/dL 4.6  3/26/20 11:47   Total Protein 5.5 - 7.7 g/dL 6.5  3/26/20 11:47   Globulin 1.9 - 3.5 g/dL 1.9  3/26/20 11:47   A-G Ratio g/dL 2.4  3/26/20 11:47   (H): Data is abnormally high  (L): Data is abnormally low   Latest Reference Range & Units 05/11/20 08:45   Immunoglobulin A 33 - 200 mg/dL 97   t-TG IgA 0 - 3 U/mL <2      Latest Reference Range & Units Most Recent   TSH 0.790 - 5.850 uIU/mL 1.950  3/26/20 11:47   Free T-4 0.53 - 1.43 ng/dL 1.00  3/26/20 11:47       Component      Latest Ref Rng & Units 6/4/2020 6/4/2020 6/4/2020           9:31 AM  9:31 AM  4:40 PM   IGF1      16 - 233 ng/mL   38     IGF-1 Z Score Calculation         -1.2     Igfbp-3      1843 - 4968 ng/mL 1670 (L)       25-Hydroxy   Vitamin D 25      30 - 100 ng/mL     26 (L)       Imaging:   Previous BA interpretation by Dr. Jesus \" Bone age Xray: CA 5 y 11 mo, BA 4 y 6 mo (radiology). Per Dr Jesus's reading: wrist 3-3y 6 mo and fingers 4 y 6 mo. So on average BA 3y 6 mo.\"     Assessment and Plan:  1. Short stature  IGF-1 to Esoterix    IGFBP-3 to Esoterix    FREE THYROXINE    TSH    Sed Rate    PHOSPHORUS    Comp Metabolic Panel    DX-BONE AGE STUDY      2. Vitamin D deficiency  Vitamin D, 25 Hydroxy        Anuarg Martin is a 9 y.o. 8 m.o. prepubertal " male who is here for follow-up of his short stature.  Today we see that his height Z score has improved from -2.6 to -2.45.  His BMI is at the 25th percentile which is normal.     His growth rate seems normal as his Z score has improved and clinically does not seem that there would be signs of growth hormone deficiency.  He has had normal thyroid function test and negative celiac disease panel and no other signs of chronic disease in the past.  He has had a slightly low vitamin D level.    However given that his growth factors were low I would like to repeat those.  along with that we will repeat his thyroid function tests and a CMP and 25-hydroxy vitamin D level.  His bone age was previously delayed indicating this could be constitutional delay of growth and puberty if he maintains a normal linear growth rate and growth factors are normal.  Therefore we will repeat his bone age now as this might be better interpreted at this age.    Follow-Up: Return in about 6 months (around 10/7/2023).    I spent 50 minutes of total time during the visit today reviewing previous labs and records, examining the patient, answering their questions, formulating and discussing the assessment and plan as noted above.    Mary Guallpa M.D.  Pediatric Endocrinology  [address] [place] [address]  [place] [address]      Vaccine status unknown

## 2023-08-04 NOTE — ED ADULT TRIAGE NOTE - CHIEF COMPLAINT QUOTE
pt BIBwheelchair c/o abdominal pain. pt states she has had lower abdominal pain and black stool x1 day. not on AC. denies dizziness/lightheadness.

## 2023-08-04 NOTE — ED ADULT NURSE NOTE - OBJECTIVE STATEMENT
pt presents to ED with complaints of abdominal pain and dark stools. pt endorses symptoms started 1 day ago. 20 g placed to left AC. labs sent.

## 2023-08-04 NOTE — ED ADULT NURSE NOTE - NSFALLRISKINTERV_ED_ALL_ED

## 2023-08-04 NOTE — ED STATDOCS - NSFOLLOWUPINSTRUCTIONS_ED_ALL_ED_FT
you have lesions in your liver. you need to follow up with a gastroenterologist    you also have a urinary tract infection and need an antibiotic    return for worsening pain , fever, chills    follow up with your doctor

## 2023-08-04 NOTE — ED STATDOCS - PATIENT PORTAL LINK FT
You can access the FollowMyHealth Patient Portal offered by [facility] by registering at the following website: [email or web address]. By joining [facility]’s FollowMyHealth portal, you will also be able to view your health information using other applications (apps) compatible with our system.

## 2023-08-04 NOTE — ED STATDOCS - NS ED ATTENDING STATEMENT MOD
This was a shared visit with the XIN. I reviewed and verified the documentation and independently performed the documented:

## 2023-08-04 NOTE — ED STATDOCS - NS ED ROS FT
Constitutional: No fever or chills  Eyes: No visual changes  HEENT: No throat pain  CV: No chest pain  Resp: No SOB no cough  GI: +abd pain, -nausea or vomiting  : No dysuria  MSK: No musculoskeletal pain  Skin: No rash  Neuro: No headache

## 2023-08-04 NOTE — ED STATDOCS - ATTENDING APP SHARED VISIT CONTRIBUTION OF CARE
Dr. Malone: I performed a face to face bedside interview with patient regarding history of present illness, review of symptoms and past medical history. I completed an independent physical exam.  I have discussed patient's plan of care with PA.   I agree with note as stated above, having amended the EMR as needed to reflect my findings.   This includes HISTORY OF PRESENT ILLNESS, HIV, PAST MEDICAL/SURGICAL/FAMILY/SOCIAL HISTORY, ALLERGIES AND HOME MEDICATIONS, REVIEW OF SYSTEMS, PHYSICAL EXAM, and any PROGRESS NOTES during the time I functioned as the attending physician for this patient.  RAYMOND Malone DO

## 2023-08-05 ENCOUNTER — APPOINTMENT (OUTPATIENT)
Dept: HOME HEALTH SERVICES | Facility: HOME HEALTH | Age: 73
End: 2023-08-05

## 2023-08-05 ENCOUNTER — NON-APPOINTMENT (OUTPATIENT)
Age: 73
End: 2023-08-05

## 2023-08-05 VITALS
OXYGEN SATURATION: 99 % | RESPIRATION RATE: 17 BRPM | HEART RATE: 65 BPM | DIASTOLIC BLOOD PRESSURE: 67 MMHG | SYSTOLIC BLOOD PRESSURE: 154 MMHG | TEMPERATURE: 98 F

## 2023-08-05 LAB
APPEARANCE UR: CLEAR — SIGNIFICANT CHANGE UP
BACTERIA # UR AUTO: ABNORMAL
BILIRUB UR-MCNC: NEGATIVE — SIGNIFICANT CHANGE UP
COLOR SPEC: YELLOW — SIGNIFICANT CHANGE UP
DIFF PNL FLD: NEGATIVE — SIGNIFICANT CHANGE UP
EPI CELLS # UR: ABNORMAL
GLUCOSE UR QL: NEGATIVE — SIGNIFICANT CHANGE UP
KETONES UR-MCNC: NEGATIVE — SIGNIFICANT CHANGE UP
LEUKOCYTE ESTERASE UR-ACNC: ABNORMAL
NITRITE UR-MCNC: NEGATIVE — SIGNIFICANT CHANGE UP
PH UR: 5 — SIGNIFICANT CHANGE UP (ref 5–8)
PROT UR-MCNC: NEGATIVE — SIGNIFICANT CHANGE UP
RBC CASTS # UR COMP ASSIST: SIGNIFICANT CHANGE UP /HPF (ref 0–4)
SP GR SPEC: 1.01 — SIGNIFICANT CHANGE UP (ref 1.01–1.02)
UROBILINOGEN FLD QL: NEGATIVE — SIGNIFICANT CHANGE UP
WBC UR QL: ABNORMAL /HPF (ref 0–5)

## 2023-08-05 RX ORDER — CEPHALEXIN 500 MG
1 CAPSULE ORAL
Qty: 15 | Refills: 0
Start: 2023-08-05 | End: 2023-08-09

## 2023-08-05 RX ORDER — ACETAMINOPHEN 500 MG/1
500 TABLET ORAL EVERY 8 HOURS
Qty: 1 | Refills: 2 | Status: ACTIVE | COMMUNITY
Start: 2023-05-30

## 2023-08-05 RX ORDER — CEPHALEXIN 500 MG
500 CAPSULE ORAL
Refills: 0 | Status: DISCONTINUED | OUTPATIENT
Start: 2023-08-05 | End: 2023-08-05

## 2023-08-05 RX ORDER — KETOCONAZOLE 20.5 MG/ML
2 SHAMPOO, SUSPENSION TOPICAL
Qty: 1 | Refills: 2 | Status: ACTIVE | COMMUNITY
Start: 2021-07-20

## 2023-08-05 RX ORDER — BLOOD SUGAR DIAGNOSTIC
STRIP MISCELLANEOUS DAILY
Qty: 100 | Refills: 0 | Status: ACTIVE | COMMUNITY
Start: 2021-08-05

## 2023-08-05 RX ORDER — GUAIFENESIN AND DEXTROMETHORPHAN HYDROBROMIDE 1200; 60 MG/1; MG/1
60-1200 TABLET, EXTENDED RELEASE ORAL
Qty: 20 | Refills: 1 | Status: ACTIVE | COMMUNITY
Start: 2022-11-01

## 2023-08-05 RX ORDER — ALBUTEROL SULFATE 90 UG/1
108 (90 BASE) AEROSOL, METERED RESPIRATORY (INHALATION)
Qty: 1 | Refills: 2 | Status: ACTIVE | COMMUNITY
Start: 2021-11-10

## 2023-08-05 RX ORDER — SIMVASTATIN 40 MG/1
40 TABLET, FILM COATED ORAL
Qty: 90 | Refills: 3 | Status: ACTIVE | COMMUNITY

## 2023-08-05 RX ORDER — BLOOD-GLUCOSE METER
W/DEVICE EACH MISCELLANEOUS
Qty: 1 | Refills: 0 | Status: ACTIVE | COMMUNITY
Start: 2021-08-05

## 2023-08-05 RX ADMIN — Medication 500 MILLIGRAM(S): at 02:13

## 2023-08-09 ENCOUNTER — RX RENEWAL (OUTPATIENT)
Age: 73
End: 2023-08-09

## 2023-08-24 ENCOUNTER — TRANSCRIPTION ENCOUNTER (OUTPATIENT)
Age: 73
End: 2023-08-24

## 2023-08-30 ENCOUNTER — APPOINTMENT (OUTPATIENT)
Dept: HOME HEALTH SERVICES | Facility: HOME HEALTH | Age: 73
End: 2023-08-30
Payer: MEDICARE

## 2023-08-30 DIAGNOSIS — Z87.898 PERSONAL HISTORY OF OTHER SPECIFIED CONDITIONS: ICD-10-CM

## 2023-08-30 DIAGNOSIS — N18.30 CHRONIC KIDNEY DISEASE, STAGE 3 UNSPECIFIED: ICD-10-CM

## 2023-08-30 DIAGNOSIS — Z95.0 PRESENCE OF CARDIAC PACEMAKER: ICD-10-CM

## 2023-08-30 DIAGNOSIS — J43.9 EMPHYSEMA, UNSPECIFIED: ICD-10-CM

## 2023-08-30 DIAGNOSIS — D63.1 CHRONIC KIDNEY DISEASE, STAGE 3 UNSPECIFIED: ICD-10-CM

## 2023-08-30 PROCEDURE — 99349 HOME/RES VST EST MOD MDM 40: CPT

## 2023-09-01 VITALS
SYSTOLIC BLOOD PRESSURE: 146 MMHG | DIASTOLIC BLOOD PRESSURE: 80 MMHG | OXYGEN SATURATION: 98 % | HEART RATE: 80 BPM | TEMPERATURE: 97.8 F

## 2023-09-01 PROBLEM — J43.9 COPD WITH EMPHYSEMA: Status: ACTIVE | Noted: 2023-03-24

## 2023-09-05 PROBLEM — Z87.898 HISTORY OF LUMP OF LEFT BREAST: Status: RESOLVED | Noted: 2020-12-08 | Resolved: 2023-09-05

## 2023-09-05 PROBLEM — Z95.0 PACEMAKER: Status: ACTIVE | Noted: 2023-09-05

## 2023-09-05 NOTE — PHYSICAL EXAM
[Normal Sclera/Conjunctiva] : normal sclera/conjunctiva [Normal Outer Ear/Nose] : the ears and nose were normal in appearance [No JVD] : no jugular venous distention [No Respiratory Distress] : no respiratory distress [Clear to Auscultation] : lungs were clear to auscultation bilaterally [No Accessory Muscle Use] : no accessory muscle use [Pedal Pulses Present] : the pedal pulses are present [Breast Exam Declined] : patient declined to have breast exam done [Soft] : abdomen soft [No Hernias] : no hernia was discovered [Patient Refused] : rectal exam was refused by the patient [No CVA Tenderness] : no ~M costovertebral angle tenderness [Kyphosis] :  kyphosis present [No Motor Deficits] : the motor exam was normal [Oriented x3] : oriented to person, place, and time [Normal Affect] : the affect was normal [de-identified] :  cheerful  and comfortable   female  in wheelchair   room  cluttered   [de-identified] : paced rhythm  [de-identified] : poor  dentition  [de-identified] : obese  [de-identified] : in wheelchair   left shoulder  ROM limited to 90 degrees no swelling or deformity

## 2023-09-05 NOTE — HISTORY OF PRESENT ILLNESS
[Patient] : patient [FreeTextEntry1] : CAD  DM   pacemaker placed  obesity    wheelchair bound  [FreeTextEntry2] :  71 y/o female with history of CAD, DM, pacemaker, wheelchair bound. Patient is being seen today for routine follow-up of chronic conditions.    most of the history obtained from  patient   interval events reviewed   and addressed    has been  stable  since last visit  with  no new major developments  complains, hospitalizations  and no changes with medications  and   unchanged chronic  condition  but has been  to ER  for abdominal pain  and CT showed 2 lesions on liver  needs MRI  today  no GI complaints but anxious  about  results   The patient was educated regarding their condition, treatment options as well as prescribed course of treatment.  Risks and benefits as well as alternatives to the proposed treatment were also provided to the patient  patient was  given the opportunity to have all questions answered in detail   denies any chest  pains  palpitations     shortness  of breath at rest but feels her pacemaker  is  "jumping  " advised  on cardio follow, up  patient continues  to smoke    5 cigarettes a day /or more / claims she  had reaction to  nicotine replacement  therapy   Extensive  counseling of  the patient and family about dangers of smoking  The patient understands the long-term effects and the risks with smoking despite that unwilling to quit  we will continue to encourage smoking cessation   c/o left shoulder pain and  limits of range of motion   concerned about  pacemaker issue  advised to see   cardio  but bartolome get Xray and  pain management  diet regular  appetite   dysphagia none  Weight claims to lost weight   when sick   Review of systems otherwise negative. See also updated history sheet.

## 2023-09-29 ENCOUNTER — NON-APPOINTMENT (OUTPATIENT)
Age: 73
End: 2023-09-29

## 2023-10-05 ENCOUNTER — APPOINTMENT (OUTPATIENT)
Dept: ELECTROPHYSIOLOGY | Facility: CLINIC | Age: 73
End: 2023-10-05
Payer: MEDICARE

## 2023-10-06 ENCOUNTER — NON-APPOINTMENT (OUTPATIENT)
Age: 73
End: 2023-10-06

## 2023-10-06 PROCEDURE — 93294 REM INTERROG EVL PM/LDLS PM: CPT

## 2023-10-06 PROCEDURE — 93296 REM INTERROG EVL PM/IDS: CPT

## 2023-10-26 ENCOUNTER — NON-APPOINTMENT (OUTPATIENT)
Age: 73
End: 2023-10-26

## 2023-10-27 ENCOUNTER — APPOINTMENT (OUTPATIENT)
Dept: HOME HEALTH SERVICES | Facility: HOME HEALTH | Age: 73
End: 2023-10-27

## 2023-11-03 RX ORDER — ALBUTEROL SULFATE 90 UG/1
108 (90 BASE) INHALANT RESPIRATORY (INHALATION)
Qty: 1 | Refills: 6 | Status: ACTIVE | COMMUNITY
Start: 2022-01-21 | End: 1900-01-01

## 2023-11-06 ENCOUNTER — RX RENEWAL (OUTPATIENT)
Age: 73
End: 2023-11-06

## 2023-11-29 ENCOUNTER — RX RENEWAL (OUTPATIENT)
Age: 73
End: 2023-11-29

## 2023-11-29 RX ORDER — METFORMIN HYDROCHLORIDE 500 MG/1
500 TABLET, COATED ORAL TWICE DAILY
Qty: 360 | Refills: 3 | Status: ACTIVE | COMMUNITY
Start: 2020-10-14 | End: 1900-01-01

## 2023-12-01 ENCOUNTER — APPOINTMENT (OUTPATIENT)
Dept: HOME HEALTH SERVICES | Facility: HOME HEALTH | Age: 73
End: 2023-12-01
Payer: MEDICARE

## 2023-12-01 VITALS
HEART RATE: 72 BPM | DIASTOLIC BLOOD PRESSURE: 82 MMHG | OXYGEN SATURATION: 98 % | TEMPERATURE: 96.4 F | SYSTOLIC BLOOD PRESSURE: 138 MMHG

## 2023-12-01 DIAGNOSIS — E66.9 TYPE 2 DIABETES MELLITUS WITH OTHER SPECIFIED COMPLICATION: ICD-10-CM

## 2023-12-01 DIAGNOSIS — I15.2 TYPE 2 DIABETES MELLITUS WITH OTHER SPECIFIED COMPLICATION: ICD-10-CM

## 2023-12-01 DIAGNOSIS — Z87.19 PERSONAL HISTORY OF OTHER DISEASES OF THE DIGESTIVE SYSTEM: ICD-10-CM

## 2023-12-01 DIAGNOSIS — Z23 ENCOUNTER FOR IMMUNIZATION: ICD-10-CM

## 2023-12-01 DIAGNOSIS — J44.9 CHRONIC OBSTRUCTIVE PULMONARY DISEASE, UNSPECIFIED: ICD-10-CM

## 2023-12-01 DIAGNOSIS — F17.200 NICOTINE DEPENDENCE, UNSPECIFIED, UNCOMPLICATED: ICD-10-CM

## 2023-12-01 DIAGNOSIS — E11.9 TYPE 2 DIABETES MELLITUS W/OUT COMPLICATIONS: ICD-10-CM

## 2023-12-01 DIAGNOSIS — F33.9 MAJOR DEPRESSIVE DISORDER, RECURRENT, UNSPECIFIED: ICD-10-CM

## 2023-12-01 DIAGNOSIS — E11.69 TYPE 2 DIABETES MELLITUS WITH OTHER SPECIFIED COMPLICATION: ICD-10-CM

## 2023-12-01 DIAGNOSIS — E11.59 TYPE 2 DIABETES MELLITUS WITH OTHER SPECIFIED COMPLICATION: ICD-10-CM

## 2023-12-01 PROCEDURE — G0008: CPT

## 2023-12-01 PROCEDURE — 90662 IIV NO PRSV INCREASED AG IM: CPT

## 2023-12-01 PROCEDURE — 99348 HOME/RES VST EST LOW MDM 30: CPT | Mod: 25

## 2023-12-12 PROBLEM — Z87.19 HISTORY OF CIRRHOSIS: Status: RESOLVED | Noted: 2020-12-12 | Resolved: 2023-12-12

## 2023-12-12 PROBLEM — F17.200 CURRENT SMOKER: Status: ACTIVE | Noted: 2020-12-08

## 2023-12-12 PROBLEM — Z23 INFLUENZA VACCINE NEEDED: Status: ACTIVE | Noted: 2021-11-10

## 2023-12-12 PROBLEM — E11.9 CONTROLLED TYPE 2 DIABETES MELLITUS WITHOUT COMPLICATION, WITHOUT LONG-TERM CURRENT USE OF INSULIN: Status: ACTIVE | Noted: 2020-12-08

## 2023-12-12 PROBLEM — J44.9 COPD, MODERATE: Status: ACTIVE | Noted: 2023-03-24

## 2023-12-12 PROBLEM — F33.9 CHRONIC RECURRENT MAJOR DEPRESSIVE DISORDER: Status: ACTIVE | Noted: 2021-04-29

## 2023-12-12 PROBLEM — E11.69 OBESITY, DIABETES, AND HYPERTENSION SYNDROME: Status: ACTIVE | Noted: 2020-12-12

## 2023-12-27 ENCOUNTER — RX CHANGE (OUTPATIENT)
Age: 73
End: 2023-12-27

## 2024-01-08 ENCOUNTER — APPOINTMENT (OUTPATIENT)
Dept: ELECTROPHYSIOLOGY | Facility: CLINIC | Age: 74
End: 2024-01-08
Payer: MEDICARE

## 2024-01-09 ENCOUNTER — NON-APPOINTMENT (OUTPATIENT)
Age: 74
End: 2024-01-09

## 2024-01-09 PROCEDURE — 93294 REM INTERROG EVL PM/LDLS PM: CPT

## 2024-01-09 PROCEDURE — 93296 REM INTERROG EVL PM/IDS: CPT

## 2024-01-16 ENCOUNTER — APPOINTMENT (OUTPATIENT)
Dept: HOME HEALTH SERVICES | Facility: HOME HEALTH | Age: 74
End: 2024-01-16

## 2024-01-17 DIAGNOSIS — M25.512 PAIN IN LEFT SHOULDER: ICD-10-CM

## 2024-01-18 ENCOUNTER — NON-APPOINTMENT (OUTPATIENT)
Age: 74
End: 2024-01-18

## 2024-01-24 ENCOUNTER — RX RENEWAL (OUTPATIENT)
Age: 74
End: 2024-01-24

## 2024-01-24 RX ORDER — LOSARTAN POTASSIUM 50 MG/1
50 TABLET, FILM COATED ORAL DAILY
Qty: 90 | Refills: 3 | Status: ACTIVE | COMMUNITY
Start: 2022-10-05 | End: 1900-01-01

## 2024-01-24 RX ORDER — MONTELUKAST 10 MG/1
10 TABLET, FILM COATED ORAL DAILY
Qty: 90 | Refills: 3 | Status: ACTIVE | COMMUNITY
Start: 2023-05-30 | End: 1900-01-01

## 2024-01-24 RX ORDER — PANTOPRAZOLE 40 MG/1
40 TABLET, DELAYED RELEASE ORAL
Qty: 90 | Refills: 3 | Status: ACTIVE | COMMUNITY
Start: 2021-04-19 | End: 1900-01-01

## 2024-01-25 ENCOUNTER — OUTPATIENT (OUTPATIENT)
Dept: OUTPATIENT SERVICES | Facility: HOSPITAL | Age: 74
LOS: 1 days | End: 2024-01-25
Payer: MEDICARE

## 2024-01-25 DIAGNOSIS — R16.0 HEPATOMEGALY, NOT ELSEWHERE CLASSIFIED: ICD-10-CM

## 2024-01-25 DIAGNOSIS — Z90.49 ACQUIRED ABSENCE OF OTHER SPECIFIED PARTS OF DIGESTIVE TRACT: Chronic | ICD-10-CM

## 2024-01-25 PROCEDURE — 74183 MRI ABD W/O CNTR FLWD CNTR: CPT

## 2024-01-25 PROCEDURE — 74183 MRI ABD W/O CNTR FLWD CNTR: CPT | Mod: 26

## 2024-01-25 PROCEDURE — 71046 X-RAY EXAM CHEST 2 VIEWS: CPT | Mod: 26

## 2024-01-25 PROCEDURE — 71046 X-RAY EXAM CHEST 2 VIEWS: CPT

## 2024-01-25 PROCEDURE — A9579: CPT

## 2024-01-26 DIAGNOSIS — R16.0 HEPATOMEGALY, NOT ELSEWHERE CLASSIFIED: ICD-10-CM

## 2024-02-07 NOTE — ED ADULT NURSE NOTE - SUICIDE SCREENING QUESTION 3
Medication:   Requested Prescriptions     Pending Prescriptions Disp Refills    LANTUS SOLOSTAR 100 UNIT/ML injection pen [Pharmacy Med Name: LANTUS SOLOSTAR 100 UNIT/ML] 9 mL      Sig: INJECT 30 UNITS UNDER THE SKIN ONCE NIGHTLY     Last Filled:  n/a    Last appt: 1/16/2024   Next appt: Visit date not found   No

## 2024-03-25 ENCOUNTER — APPOINTMENT (OUTPATIENT)
Dept: INTERNAL MEDICINE | Facility: CLINIC | Age: 74
End: 2024-03-25

## 2024-04-06 NOTE — DISCHARGE NOTE ADULT - OTHER SIGNIFICANT FINDINGS
< from: Transthoracic Echocardiogram (04.19.18 @ 14:47) >   Summary     Normal appearing mitral valve structure and function.   Mild (1+) mitral regurgitation is present.   EA reversal of the mitral inflow consistent with reduced compliance of   the  left ventricle.  Normal aortic valve structure and function.   Normal appearing tricuspid valve structureand function.   Mild (1+) tricuspid valve regurgitation is present.   The left ventricle is normal in size, wall thickness, wall motion and   contractility.  Estimated left ventricular ejection fraction is 55 %.   Normal appearing right ventricle structure and function.    < end of copied text >    < from: Cardiac Cath Lab - Adult (04.23.18 @ 10:57) >  [facility]  Ventriculography Findings:  Normal left ventricular systolic function.  Left ventricular cavity size is normal.     Impression / Diagnostic Conclusions   Non obstructive coronary artery disease.   Normal LV systolicfunction. Estimated LV ejection fraction is 55 %.   No aortic valve stenosis.    < end of copied text >    < from: CT Lumbar Spine No Cont (04.22.18 @ 13:09) >  IMPRESSION:  Unremarkable CT scanof the thoracic spine.   Multilevel   degenerative disc disease and spondylosis involving the visualized lower   cervical, thoracic and lumbar spine. Posterior osteophytic ridge/disc   complexes at T6-7 and T7-8 flatten the ventral thecal sac.  Disc bulges   at L1-2 through L5-S1 flatten the ventral thecal sac and narrow the   BILATERAL neural foramina. Mild facet osteophytic mild-to-moderate facet   osteophytic hypertrophy is noted at L2-3 and L3-4 and L5-S1. Severe facet   osteophytic hypertrophy is noted at L4-5 which contributes to moderate   central stenosis at this level. Small central disc herniation noted at   L5-S1.          < end of copied text >
Clear

## 2024-04-24 ENCOUNTER — NON-APPOINTMENT (OUTPATIENT)
Age: 74
End: 2024-04-24

## 2024-04-24 ENCOUNTER — APPOINTMENT (OUTPATIENT)
Dept: ELECTROPHYSIOLOGY | Facility: CLINIC | Age: 74
End: 2024-04-24
Payer: MEDICARE

## 2024-04-25 PROCEDURE — 93294 REM INTERROG EVL PM/LDLS PM: CPT

## 2024-04-25 PROCEDURE — 93296 REM INTERROG EVL PM/IDS: CPT

## 2024-06-25 ENCOUNTER — RX RENEWAL (OUTPATIENT)
Age: 74
End: 2024-06-25

## 2024-06-25 RX ORDER — SERTRALINE HYDROCHLORIDE 100 MG/1
100 TABLET, FILM COATED ORAL DAILY
Qty: 90 | Refills: 3 | Status: ACTIVE | COMMUNITY
Start: 2023-06-07 | End: 1900-01-01

## 2024-07-24 ENCOUNTER — RX RENEWAL (OUTPATIENT)
Age: 74
End: 2024-07-24

## 2024-07-28 ENCOUNTER — NON-APPOINTMENT (OUTPATIENT)
Age: 74
End: 2024-07-28

## 2024-07-29 ENCOUNTER — APPOINTMENT (OUTPATIENT)
Dept: ELECTROPHYSIOLOGY | Facility: CLINIC | Age: 74
End: 2024-07-29
Payer: MEDICARE

## 2024-07-29 PROCEDURE — 93296 REM INTERROG EVL PM/IDS: CPT

## 2024-07-29 PROCEDURE — 93294 REM INTERROG EVL PM/LDLS PM: CPT

## 2024-08-02 ENCOUNTER — NON-APPOINTMENT (OUTPATIENT)
Age: 74
End: 2024-08-02

## 2024-10-14 ENCOUNTER — APPOINTMENT (OUTPATIENT)
Dept: HOME HEALTH SERVICES | Facility: HOME HEALTH | Age: 74
End: 2024-10-14
Payer: MEDICARE

## 2024-10-14 VITALS
RESPIRATION RATE: 18 BRPM | SYSTOLIC BLOOD PRESSURE: 110 MMHG | DIASTOLIC BLOOD PRESSURE: 64 MMHG | HEART RATE: 60 BPM | OXYGEN SATURATION: 98 %

## 2024-10-14 DIAGNOSIS — F17.200 NICOTINE DEPENDENCE, UNSPECIFIED, UNCOMPLICATED: ICD-10-CM

## 2024-10-14 DIAGNOSIS — E11.9 TYPE 2 DIABETES MELLITUS W/OUT COMPLICATIONS: ICD-10-CM

## 2024-10-14 DIAGNOSIS — E55.9 VITAMIN D DEFICIENCY, UNSPECIFIED: ICD-10-CM

## 2024-10-14 DIAGNOSIS — I10 ESSENTIAL (PRIMARY) HYPERTENSION: ICD-10-CM

## 2024-10-14 DIAGNOSIS — J44.9 CHRONIC OBSTRUCTIVE PULMONARY DISEASE, UNSPECIFIED: ICD-10-CM

## 2024-10-14 DIAGNOSIS — E78.5 HYPERLIPIDEMIA, UNSPECIFIED: ICD-10-CM

## 2024-10-14 DIAGNOSIS — Z79.899 OTHER LONG TERM (CURRENT) DRUG THERAPY: ICD-10-CM

## 2024-10-14 DIAGNOSIS — F33.9 MAJOR DEPRESSIVE DISORDER, RECURRENT, UNSPECIFIED: ICD-10-CM

## 2024-10-14 DIAGNOSIS — Z12.31 ENCOUNTER FOR SCREENING MAMMOGRAM FOR MALIGNANT NEOPLASM OF BREAST: ICD-10-CM

## 2024-10-14 PROCEDURE — 99349 HOME/RES VST EST MOD MDM 40: CPT

## 2024-10-16 ENCOUNTER — LABORATORY RESULT (OUTPATIENT)
Age: 74
End: 2024-10-16

## 2024-10-17 DIAGNOSIS — D64.9 ANEMIA, UNSPECIFIED: ICD-10-CM

## 2024-10-17 DIAGNOSIS — E87.5 HYPERKALEMIA: ICD-10-CM

## 2024-10-17 RX ORDER — SODIUM POLYSTYRENE SULFONATE 15 G/60ML
15 SUSPENSION ORAL; RECTAL
Qty: 120 | Refills: 1 | Status: ACTIVE | COMMUNITY
Start: 2024-10-17 | End: 1900-01-01

## 2024-10-17 RX ORDER — CHOLECALCIFEROL (VITAMIN D3) 1250 MCG
1.25 MG CAPSULE ORAL
Qty: 12 | Refills: 1 | Status: ACTIVE | COMMUNITY
Start: 2024-10-17 | End: 1900-01-01

## 2024-10-23 ENCOUNTER — LABORATORY RESULT (OUTPATIENT)
Age: 74
End: 2024-10-23

## 2024-10-28 ENCOUNTER — APPOINTMENT (OUTPATIENT)
Dept: ELECTROPHYSIOLOGY | Facility: CLINIC | Age: 74
End: 2024-10-28
Payer: MEDICARE

## 2024-10-28 ENCOUNTER — NON-APPOINTMENT (OUTPATIENT)
Age: 74
End: 2024-10-28

## 2024-10-28 PROCEDURE — 93296 REM INTERROG EVL PM/IDS: CPT

## 2024-10-28 PROCEDURE — 93294 REM INTERROG EVL PM/LDLS PM: CPT

## 2024-10-30 ENCOUNTER — APPOINTMENT (OUTPATIENT)
Dept: ORTHOPEDIC SURGERY | Facility: CLINIC | Age: 74
End: 2024-10-30

## 2024-10-30 ENCOUNTER — APPOINTMENT (OUTPATIENT)
Dept: HOME HEALTH SERVICES | Facility: HOME HEALTH | Age: 74
End: 2024-10-30

## 2024-11-25 ENCOUNTER — RX RENEWAL (OUTPATIENT)
Age: 74
End: 2024-11-25

## 2024-12-11 ENCOUNTER — RX RENEWAL (OUTPATIENT)
Age: 74
End: 2024-12-11

## 2025-01-23 ENCOUNTER — APPOINTMENT (OUTPATIENT)
Dept: ELECTROPHYSIOLOGY | Facility: CLINIC | Age: 75
End: 2025-01-23

## 2025-01-27 ENCOUNTER — NON-APPOINTMENT (OUTPATIENT)
Age: 75
End: 2025-01-27

## 2025-01-27 ENCOUNTER — APPOINTMENT (OUTPATIENT)
Dept: HOME HEALTH SERVICES | Facility: HOME HEALTH | Age: 75
End: 2025-01-27
Payer: MEDICARE

## 2025-01-27 VITALS
OXYGEN SATURATION: 82 % | DIASTOLIC BLOOD PRESSURE: 72 MMHG | SYSTOLIC BLOOD PRESSURE: 140 MMHG | HEART RATE: 82 BPM | RESPIRATION RATE: 17 BRPM

## 2025-01-27 DIAGNOSIS — Z12.2 ENCOUNTER FOR SCREENING FOR MALIGNANT NEOPLASM OF RESPIRATORY ORGANS: ICD-10-CM

## 2025-01-27 DIAGNOSIS — N18.30 CHRONIC KIDNEY DISEASE, STAGE 3 UNSPECIFIED: ICD-10-CM

## 2025-01-27 DIAGNOSIS — J43.9 EMPHYSEMA, UNSPECIFIED: ICD-10-CM

## 2025-01-27 DIAGNOSIS — E11.51 TYPE 2 DIABETES MELLITUS WITH DIABETIC PERIPHERAL ANGIOPATHY W/OUT GANGRENE: ICD-10-CM

## 2025-01-27 DIAGNOSIS — D63.1 CHRONIC KIDNEY DISEASE, STAGE 3 UNSPECIFIED: ICD-10-CM

## 2025-01-27 DIAGNOSIS — Z12.11 ENCOUNTER FOR SCREENING FOR MALIGNANT NEOPLASM OF COLON: ICD-10-CM

## 2025-01-27 DIAGNOSIS — F33.9 MAJOR DEPRESSIVE DISORDER, RECURRENT, UNSPECIFIED: ICD-10-CM

## 2025-01-27 DIAGNOSIS — I70.209 TYPE 2 DIABETES MELLITUS WITH DIABETIC PERIPHERAL ANGIOPATHY W/OUT GANGRENE: ICD-10-CM

## 2025-01-27 PROCEDURE — 99349 HOME/RES VST EST MOD MDM 40: CPT

## 2025-01-28 ENCOUNTER — LABORATORY RESULT (OUTPATIENT)
Age: 75
End: 2025-01-28

## 2025-01-29 ENCOUNTER — NON-APPOINTMENT (OUTPATIENT)
Age: 75
End: 2025-01-29

## 2025-01-29 ENCOUNTER — TRANSCRIPTION ENCOUNTER (OUTPATIENT)
Age: 75
End: 2025-01-29

## 2025-01-30 ENCOUNTER — LABORATORY RESULT (OUTPATIENT)
Age: 75
End: 2025-01-30

## 2025-02-05 ENCOUNTER — APPOINTMENT (OUTPATIENT)
Dept: HOME HEALTH SERVICES | Facility: HOME HEALTH | Age: 75
End: 2025-02-05

## 2025-02-05 RX ORDER — HYDROCHLOROTHIAZIDE 12.5 MG/1
12.5 TABLET ORAL DAILY
Qty: 30 | Refills: 3 | Status: ACTIVE | COMMUNITY
Start: 2025-01-29

## 2025-02-11 ENCOUNTER — TRANSCRIPTION ENCOUNTER (OUTPATIENT)
Age: 75
End: 2025-02-11

## 2025-02-11 ENCOUNTER — LABORATORY RESULT (OUTPATIENT)
Age: 75
End: 2025-02-11

## 2025-02-26 ENCOUNTER — RX CHANGE (OUTPATIENT)
Age: 75
End: 2025-02-26

## 2025-02-26 RX ORDER — HYDROCHLOROTHIAZIDE 12.5 MG/1
12.5 TABLET ORAL
Qty: 90 | Refills: 3 | Status: ACTIVE | COMMUNITY
Start: 2025-02-26 | End: 1900-01-01

## 2025-02-28 ENCOUNTER — NON-APPOINTMENT (OUTPATIENT)
Age: 75
End: 2025-02-28

## 2025-02-28 ENCOUNTER — APPOINTMENT (OUTPATIENT)
Dept: ELECTROPHYSIOLOGY | Facility: CLINIC | Age: 75
End: 2025-02-28
Payer: MEDICARE

## 2025-02-28 VITALS
BODY MASS INDEX: 29.44 KG/M2 | HEIGHT: 62 IN | HEART RATE: 73 BPM | OXYGEN SATURATION: 100 % | SYSTOLIC BLOOD PRESSURE: 141 MMHG | WEIGHT: 160 LBS | DIASTOLIC BLOOD PRESSURE: 80 MMHG

## 2025-02-28 PROCEDURE — 93280 PM DEVICE PROGR EVAL DUAL: CPT

## 2025-03-28 ENCOUNTER — APPOINTMENT (OUTPATIENT)
Dept: HOME HEALTH SERVICES | Facility: HOME HEALTH | Age: 75
End: 2025-03-28

## 2025-05-12 ENCOUNTER — TRANSCRIPTION ENCOUNTER (OUTPATIENT)
Age: 75
End: 2025-05-12

## 2025-05-12 ENCOUNTER — NON-APPOINTMENT (OUTPATIENT)
Age: 75
End: 2025-05-12

## 2025-05-21 ENCOUNTER — APPOINTMENT (OUTPATIENT)
Dept: HOME HEALTH SERVICES | Facility: HOME HEALTH | Age: 75
End: 2025-05-21

## 2025-05-21 VITALS
DIASTOLIC BLOOD PRESSURE: 82 MMHG | RESPIRATION RATE: 17 BRPM | HEART RATE: 72 BPM | SYSTOLIC BLOOD PRESSURE: 136 MMHG | TEMPERATURE: 97.5 F | OXYGEN SATURATION: 100 %

## 2025-05-21 DIAGNOSIS — E78.5 HYPERLIPIDEMIA, UNSPECIFIED: ICD-10-CM

## 2025-05-21 DIAGNOSIS — E11.59 TYPE 2 DIABETES MELLITUS WITH OTHER SPECIFIED COMPLICATION: ICD-10-CM

## 2025-05-21 DIAGNOSIS — N63.10 UNSPECIFIED LUMP IN THE RIGHT BREAST, UNSPECIFIED QUADRANT: ICD-10-CM

## 2025-05-21 DIAGNOSIS — J43.9 EMPHYSEMA, UNSPECIFIED: ICD-10-CM

## 2025-05-21 DIAGNOSIS — E66.9 TYPE 2 DIABETES MELLITUS WITH OTHER SPECIFIED COMPLICATION: ICD-10-CM

## 2025-05-21 DIAGNOSIS — E11.69 TYPE 2 DIABETES MELLITUS WITH OTHER SPECIFIED COMPLICATION: ICD-10-CM

## 2025-05-21 DIAGNOSIS — F33.9 MAJOR DEPRESSIVE DISORDER, RECURRENT, UNSPECIFIED: ICD-10-CM

## 2025-05-21 DIAGNOSIS — K08.89 OTHER SPECIFIED DISORDERS OF TEETH AND SUPPORTING STRUCTURES: ICD-10-CM

## 2025-05-21 DIAGNOSIS — I70.209 TYPE 2 DIABETES MELLITUS WITH DIABETIC PERIPHERAL ANGIOPATHY W/OUT GANGRENE: ICD-10-CM

## 2025-05-21 DIAGNOSIS — N18.30 CHRONIC KIDNEY DISEASE, STAGE 3 UNSPECIFIED: ICD-10-CM

## 2025-05-21 DIAGNOSIS — J45.909 UNSPECIFIED ASTHMA, UNCOMPLICATED: ICD-10-CM

## 2025-05-21 DIAGNOSIS — E11.51 TYPE 2 DIABETES MELLITUS WITH DIABETIC PERIPHERAL ANGIOPATHY W/OUT GANGRENE: ICD-10-CM

## 2025-05-21 DIAGNOSIS — F17.200 NICOTINE DEPENDENCE, UNSPECIFIED, UNCOMPLICATED: ICD-10-CM

## 2025-05-21 DIAGNOSIS — D63.1 CHRONIC KIDNEY DISEASE, STAGE 3 UNSPECIFIED: ICD-10-CM

## 2025-05-21 DIAGNOSIS — E11.9 TYPE 2 DIABETES MELLITUS W/OUT COMPLICATIONS: ICD-10-CM

## 2025-05-21 DIAGNOSIS — I10 ESSENTIAL (PRIMARY) HYPERTENSION: ICD-10-CM

## 2025-05-21 DIAGNOSIS — I15.2 TYPE 2 DIABETES MELLITUS WITH OTHER SPECIFIED COMPLICATION: ICD-10-CM

## 2025-05-21 DIAGNOSIS — E87.5 HYPERKALEMIA: ICD-10-CM

## 2025-05-21 DIAGNOSIS — F17.210 NICOTINE DEPENDENCE, CIGARETTES, UNCOMPLICATED: ICD-10-CM

## 2025-05-21 PROCEDURE — 99349 HOME/RES VST EST MOD MDM 40: CPT | Mod: 25

## 2025-05-21 PROCEDURE — 99406 BEHAV CHNG SMOKING 3-10 MIN: CPT

## 2025-05-30 ENCOUNTER — APPOINTMENT (OUTPATIENT)
Dept: ELECTROPHYSIOLOGY | Facility: CLINIC | Age: 75
End: 2025-05-30
Payer: MEDICARE

## 2025-05-30 ENCOUNTER — NON-APPOINTMENT (OUTPATIENT)
Age: 75
End: 2025-05-30

## 2025-05-30 PROCEDURE — 93296 REM INTERROG EVL PM/IDS: CPT

## 2025-05-30 PROCEDURE — 93294 REM INTERROG EVL PM/LDLS PM: CPT

## 2025-06-09 ENCOUNTER — TRANSCRIPTION ENCOUNTER (OUTPATIENT)
Age: 75
End: 2025-06-09

## 2025-06-09 ENCOUNTER — APPOINTMENT (OUTPATIENT)
Dept: MAMMOGRAPHY | Facility: CLINIC | Age: 75
End: 2025-06-09

## 2025-06-09 ENCOUNTER — APPOINTMENT (OUTPATIENT)
Dept: ULTRASOUND IMAGING | Facility: CLINIC | Age: 75
End: 2025-06-09

## 2025-06-09 PROBLEM — E11.69 OBESITY, DIABETES, AND HYPERTENSION SYNDROME: Status: RESOLVED | Noted: 2020-12-12 | Resolved: 2025-06-09

## 2025-06-10 ENCOUNTER — LABORATORY RESULT (OUTPATIENT)
Age: 75
End: 2025-06-10

## 2025-06-12 ENCOUNTER — LABORATORY RESULT (OUTPATIENT)
Age: 75
End: 2025-06-12

## 2025-06-13 ENCOUNTER — LABORATORY RESULT (OUTPATIENT)
Age: 75
End: 2025-06-13

## 2025-06-24 ENCOUNTER — TRANSCRIPTION ENCOUNTER (OUTPATIENT)
Age: 75
End: 2025-06-24

## 2025-06-24 RX ORDER — CLINDAMYCIN HYDROCHLORIDE 150 MG/1
150 CAPSULE ORAL 3 TIMES DAILY
Qty: 63 | Refills: 0 | Status: ACTIVE | COMMUNITY
Start: 2025-06-24 | End: 1900-01-01

## 2025-06-24 RX ORDER — BACILLUS COAGULANS 1B CELL
CAPSULE ORAL
Qty: 14 | Refills: 0 | Status: ACTIVE | COMMUNITY
Start: 2025-06-24 | End: 1900-01-01

## 2025-07-08 ENCOUNTER — RESULT REVIEW (OUTPATIENT)
Age: 75
End: 2025-07-08

## 2025-07-08 ENCOUNTER — OUTPATIENT (OUTPATIENT)
Dept: OUTPATIENT SERVICES | Facility: HOSPITAL | Age: 75
LOS: 1 days | End: 2025-07-08
Payer: MEDICARE

## 2025-07-08 ENCOUNTER — APPOINTMENT (OUTPATIENT)
Dept: ULTRASOUND IMAGING | Facility: CLINIC | Age: 75
End: 2025-07-08
Payer: MEDICARE

## 2025-07-08 ENCOUNTER — APPOINTMENT (OUTPATIENT)
Dept: MAMMOGRAPHY | Facility: CLINIC | Age: 75
End: 2025-07-08
Payer: MEDICARE

## 2025-07-08 DIAGNOSIS — Z90.49 ACQUIRED ABSENCE OF OTHER SPECIFIED PARTS OF DIGESTIVE TRACT: Chronic | ICD-10-CM

## 2025-07-08 DIAGNOSIS — Z00.00 ENCOUNTER FOR GENERAL ADULT MEDICAL EXAMINATION WITHOUT ABNORMAL FINDINGS: ICD-10-CM

## 2025-07-08 PROCEDURE — 77066 DX MAMMO INCL CAD BI: CPT | Mod: 26

## 2025-07-08 PROCEDURE — 77066 DX MAMMO INCL CAD BI: CPT

## 2025-07-08 PROCEDURE — G0279: CPT

## 2025-07-08 PROCEDURE — G0279: CPT | Mod: 26

## 2025-07-08 PROCEDURE — 76641 ULTRASOUND BREAST COMPLETE: CPT

## 2025-07-08 PROCEDURE — 76641 ULTRASOUND BREAST COMPLETE: CPT | Mod: 26,RT

## 2025-08-07 ENCOUNTER — APPOINTMENT (OUTPATIENT)
Dept: ULTRASOUND IMAGING | Facility: CLINIC | Age: 75
End: 2025-08-07

## 2025-08-07 ENCOUNTER — RESULT REVIEW (OUTPATIENT)
Age: 75
End: 2025-08-07

## 2025-08-07 ENCOUNTER — APPOINTMENT (OUTPATIENT)
Dept: ULTRASOUND IMAGING | Facility: CLINIC | Age: 75
End: 2025-08-07
Payer: MEDICARE

## 2025-08-07 ENCOUNTER — OUTPATIENT (OUTPATIENT)
Dept: OUTPATIENT SERVICES | Facility: HOSPITAL | Age: 75
LOS: 1 days | End: 2025-08-07
Payer: MEDICARE

## 2025-08-07 DIAGNOSIS — Z00.8 ENCOUNTER FOR OTHER GENERAL EXAMINATION: ICD-10-CM

## 2025-08-07 DIAGNOSIS — N63.0 UNSPECIFIED LUMP IN UNSPECIFIED BREAST: ICD-10-CM

## 2025-08-07 DIAGNOSIS — Z90.49 ACQUIRED ABSENCE OF OTHER SPECIFIED PARTS OF DIGESTIVE TRACT: Chronic | ICD-10-CM

## 2025-08-07 PROCEDURE — 88360 TUMOR IMMUNOHISTOCHEM/MANUAL: CPT

## 2025-08-07 PROCEDURE — 19083 BX BREAST 1ST LESION US IMAG: CPT

## 2025-08-07 PROCEDURE — 88305 TISSUE EXAM BY PATHOLOGIST: CPT | Mod: 26

## 2025-08-07 PROCEDURE — 19083 BX BREAST 1ST LESION US IMAG: CPT | Mod: RT

## 2025-08-07 PROCEDURE — 77065 DX MAMMO INCL CAD UNI: CPT

## 2025-08-07 PROCEDURE — 88305 TISSUE EXAM BY PATHOLOGIST: CPT

## 2025-08-07 PROCEDURE — 19084 BX BREAST ADD LESION US IMAG: CPT | Mod: RT

## 2025-08-07 PROCEDURE — A4648: CPT

## 2025-08-07 PROCEDURE — 77065 DX MAMMO INCL CAD UNI: CPT | Mod: 26,RT

## 2025-08-07 PROCEDURE — 88342 IMHCHEM/IMCYTCHM 1ST ANTB: CPT

## 2025-08-07 PROCEDURE — 88342 IMHCHEM/IMCYTCHM 1ST ANTB: CPT | Mod: 26,59

## 2025-08-07 PROCEDURE — 19084 BX BREAST ADD LESION US IMAG: CPT

## 2025-08-07 PROCEDURE — 88360 TUMOR IMMUNOHISTOCHEM/MANUAL: CPT | Mod: 26

## 2025-08-13 ENCOUNTER — NON-APPOINTMENT (OUTPATIENT)
Age: 75
End: 2025-08-13

## 2025-08-14 ENCOUNTER — APPOINTMENT (OUTPATIENT)
Dept: BREAST CENTER | Facility: CLINIC | Age: 75
End: 2025-08-14
Payer: MEDICARE

## 2025-08-14 DIAGNOSIS — C50.111 MALIGNANT NEOPLASM OF CENTRAL PORTION OF RIGHT FEMALE BREAST: ICD-10-CM

## 2025-08-14 DIAGNOSIS — Z17.0 MALIGNANT NEOPLASM OF CENTRAL PORTION OF RIGHT FEMALE BREAST: ICD-10-CM

## 2025-08-14 PROCEDURE — 99205 OFFICE O/P NEW HI 60 MIN: CPT | Mod: 25

## 2025-08-14 PROCEDURE — 76642 ULTRASOUND BREAST LIMITED: CPT | Mod: RT

## 2025-08-25 ENCOUNTER — APPOINTMENT (OUTPATIENT)
Dept: NUCLEAR MEDICINE | Facility: CLINIC | Age: 75
End: 2025-08-25
Payer: MEDICARE

## 2025-08-25 ENCOUNTER — OUTPATIENT (OUTPATIENT)
Dept: OUTPATIENT SERVICES | Facility: HOSPITAL | Age: 75
LOS: 1 days | End: 2025-08-25

## 2025-08-25 DIAGNOSIS — C50.111 MALIGNANT NEOPLASM OF CENTRAL PORTION OF RIGHT FEMALE BREAST: ICD-10-CM

## 2025-08-25 DIAGNOSIS — Z90.49 ACQUIRED ABSENCE OF OTHER SPECIFIED PARTS OF DIGESTIVE TRACT: Chronic | ICD-10-CM

## 2025-08-25 PROCEDURE — 78815 PET IMAGE W/CT SKULL-THIGH: CPT | Mod: 26,PI

## 2025-08-26 ENCOUNTER — NON-APPOINTMENT (OUTPATIENT)
Age: 75
End: 2025-08-26

## 2025-08-29 ENCOUNTER — APPOINTMENT (OUTPATIENT)
Dept: ELECTROPHYSIOLOGY | Facility: CLINIC | Age: 75
End: 2025-08-29

## 2025-08-29 ENCOUNTER — NON-APPOINTMENT (OUTPATIENT)
Age: 75
End: 2025-08-29

## 2025-08-29 PROCEDURE — 93296 REM INTERROG EVL PM/IDS: CPT

## 2025-08-29 PROCEDURE — 93294 REM INTERROG EVL PM/LDLS PM: CPT

## 2025-09-02 ENCOUNTER — RESULT REVIEW (OUTPATIENT)
Age: 75
End: 2025-09-02

## 2025-09-02 ENCOUNTER — APPOINTMENT (OUTPATIENT)
Dept: HEMATOLOGY ONCOLOGY | Facility: CLINIC | Age: 75
End: 2025-09-02
Payer: MEDICARE

## 2025-09-02 VITALS
HEART RATE: 59 BPM | OXYGEN SATURATION: 99 % | TEMPERATURE: 97.3 F | SYSTOLIC BLOOD PRESSURE: 143 MMHG | DIASTOLIC BLOOD PRESSURE: 83 MMHG

## 2025-09-02 DIAGNOSIS — Z87.898 PERSONAL HISTORY OF OTHER SPECIFIED CONDITIONS: ICD-10-CM

## 2025-09-02 DIAGNOSIS — Z87.09 PERSONAL HISTORY OF OTHER DISEASES OF THE RESPIRATORY SYSTEM: ICD-10-CM

## 2025-09-02 DIAGNOSIS — Z92.89 PERSONAL HISTORY OF OTHER MEDICAL TREATMENT: ICD-10-CM

## 2025-09-02 DIAGNOSIS — Z23 ENCOUNTER FOR IMMUNIZATION: ICD-10-CM

## 2025-09-02 DIAGNOSIS — C50.111 MALIGNANT NEOPLASM OF CENTRAL PORTION OF RIGHT FEMALE BREAST: ICD-10-CM

## 2025-09-02 DIAGNOSIS — N18.30 CHRONIC KIDNEY DISEASE, STAGE 3 UNSPECIFIED: ICD-10-CM

## 2025-09-02 DIAGNOSIS — Z12.2 ENCOUNTER FOR SCREENING FOR MALIGNANT NEOPLASM OF RESPIRATORY ORGANS: ICD-10-CM

## 2025-09-02 DIAGNOSIS — Z91.199 PATIENT'S NONCOMPLIANCE WITH OTHER MEDICAL TREATMENT AND REGIMEN DUE TO UNSPECIFIED REASON: ICD-10-CM

## 2025-09-02 DIAGNOSIS — Z92.29 PERSONAL HISTORY OF OTHER DRUG THERAPY: ICD-10-CM

## 2025-09-02 DIAGNOSIS — H61.23 IMPACTED CERUMEN, BILATERAL: ICD-10-CM

## 2025-09-02 DIAGNOSIS — Z17.0 MALIGNANT NEOPLASM OF CENTRAL PORTION OF RIGHT FEMALE BREAST: ICD-10-CM

## 2025-09-02 DIAGNOSIS — D63.1 CHRONIC KIDNEY DISEASE, STAGE 3 UNSPECIFIED: ICD-10-CM

## 2025-09-02 DIAGNOSIS — Z12.11 ENCOUNTER FOR SCREENING FOR MALIGNANT NEOPLASM OF COLON: ICD-10-CM

## 2025-09-02 PROCEDURE — 99205 OFFICE O/P NEW HI 60 MIN: CPT

## 2025-09-02 PROCEDURE — G2211 COMPLEX E/M VISIT ADD ON: CPT

## 2025-09-02 RX ORDER — ANASTROZOLE TABLETS 1 MG/1
1 TABLET ORAL DAILY
Qty: 90 | Refills: 3 | Status: ACTIVE | COMMUNITY
Start: 2025-09-02 | End: 1900-01-01

## 2025-09-03 LAB
25(OH)D3 SERPL-MCNC: 110 NG/ML
ALBUMIN SERPL ELPH-MCNC: 4.5 G/DL
ALP BLD-CCNC: 61 U/L
ALT SERPL-CCNC: 10 U/L
ANION GAP SERPL CALC-SCNC: 16 MMOL/L
APTT BLD: 34 SEC
AST SERPL-CCNC: 17 U/L
BILIRUB SERPL-MCNC: 0.2 MG/DL
BUN SERPL-MCNC: 25 MG/DL
CALCIUM SERPL-MCNC: 9.6 MG/DL
CANCER AG15-3 SERPL-ACNC: 23.8 U/ML
CANCER AG27-29 SERPL-ACNC: 22.2 U/ML
CHLORIDE SERPL-SCNC: 104 MMOL/L
CO2 SERPL-SCNC: 20 MMOL/L
CREAT SERPL-MCNC: 0.89 MG/DL
EGFRCR SERPLBLD CKD-EPI 2021: 68 ML/MIN/1.73M2
FERRITIN SERPL-MCNC: 18 NG/ML
FOLATE SERPL-MCNC: 19 NG/ML
GLUCOSE SERPL-MCNC: 133 MG/DL
INR PPP: 1.09 RATIO
IRON SATN MFR SERPL: 9 %
IRON SERPL-MCNC: 36 UG/DL
MAGNESIUM SERPL-MCNC: 1.7 MG/DL
PHOSPHATE SERPL-MCNC: 2.8 MG/DL
POTASSIUM SERPL-SCNC: 4.4 MMOL/L
PROT SERPL-MCNC: 7.5 G/DL
PT BLD: 12.6 SEC
SODIUM SERPL-SCNC: 141 MMOL/L
TIBC SERPL-MCNC: 415 UG/DL
UIBC SERPL-MCNC: 379 UG/DL
VIT B12 SERPL-MCNC: 371 PG/ML

## 2025-09-11 ENCOUNTER — NON-APPOINTMENT (OUTPATIENT)
Age: 75
End: 2025-09-11

## 2025-09-12 ENCOUNTER — TRANSCRIPTION ENCOUNTER (OUTPATIENT)
Age: 75
End: 2025-09-12

## 2025-09-12 ENCOUNTER — NON-APPOINTMENT (OUTPATIENT)
Age: 75
End: 2025-09-12

## 2025-09-18 ENCOUNTER — APPOINTMENT (OUTPATIENT)
Dept: INFUSION THERAPY | Facility: CLINIC | Age: 75
End: 2025-09-18

## 2025-09-18 VITALS
HEART RATE: 70 BPM | DIASTOLIC BLOOD PRESSURE: 69 MMHG | BODY MASS INDEX: 23.78 KG/M2 | SYSTOLIC BLOOD PRESSURE: 145 MMHG | OXYGEN SATURATION: 99 % | WEIGHT: 130 LBS | TEMPERATURE: 97.7 F